# Patient Record
Sex: MALE | Race: OTHER | NOT HISPANIC OR LATINO | URBAN - METROPOLITAN AREA
[De-identification: names, ages, dates, MRNs, and addresses within clinical notes are randomized per-mention and may not be internally consistent; named-entity substitution may affect disease eponyms.]

---

## 2017-06-02 ENCOUNTER — EMERGENCY (EMERGENCY)
Facility: HOSPITAL | Age: 37
LOS: 1 days | Discharge: HOME | End: 2017-06-02
Attending: EMERGENCY MEDICINE | Admitting: EMERGENCY MEDICINE
Payer: MEDICARE

## 2017-06-02 VITALS
SYSTOLIC BLOOD PRESSURE: 130 MMHG | DIASTOLIC BLOOD PRESSURE: 72 MMHG | HEART RATE: 72 BPM | TEMPERATURE: 98 F | RESPIRATION RATE: 16 BRPM | OXYGEN SATURATION: 100 %

## 2017-06-02 VITALS
SYSTOLIC BLOOD PRESSURE: 126 MMHG | TEMPERATURE: 98 F | DIASTOLIC BLOOD PRESSURE: 71 MMHG | OXYGEN SATURATION: 100 % | RESPIRATION RATE: 16 BRPM | HEART RATE: 70 BPM

## 2017-06-02 DIAGNOSIS — Z98.890 OTHER SPECIFIED POSTPROCEDURAL STATES: Chronic | ICD-10-CM

## 2017-06-02 LAB
BASOPHILS # BLD AUTO: 0.03 K/UL — SIGNIFICANT CHANGE UP (ref 0–0.2)
BASOPHILS NFR BLD AUTO: 0.4 % — SIGNIFICANT CHANGE UP (ref 0–2)
EOSINOPHIL # BLD AUTO: 0.19 K/UL — SIGNIFICANT CHANGE UP (ref 0–0.5)
EOSINOPHIL NFR BLD AUTO: 2.3 % — SIGNIFICANT CHANGE UP (ref 0–6)
HCT VFR BLD CALC: 47.3 % — SIGNIFICANT CHANGE UP (ref 39–50)
HGB BLD-MCNC: 14.9 G/DL — SIGNIFICANT CHANGE UP (ref 13–17)
IMM GRANULOCYTES NFR BLD AUTO: 0.1 % — SIGNIFICANT CHANGE UP (ref 0–1.5)
LYMPHOCYTES # BLD AUTO: 1.69 K/UL — SIGNIFICANT CHANGE UP (ref 1–3.3)
LYMPHOCYTES # BLD AUTO: 20.2 % — SIGNIFICANT CHANGE UP (ref 13–44)
MCHC RBC-ENTMCNC: 27.2 PG — SIGNIFICANT CHANGE UP (ref 27–34)
MCHC RBC-ENTMCNC: 31.5 % — LOW (ref 32–36)
MCV RBC AUTO: 86.3 FL — SIGNIFICANT CHANGE UP (ref 80–100)
MONOCYTES # BLD AUTO: 0.3 K/UL — SIGNIFICANT CHANGE UP (ref 0–0.9)
MONOCYTES NFR BLD AUTO: 3.6 % — SIGNIFICANT CHANGE UP (ref 2–14)
NEUTROPHILS # BLD AUTO: 6.13 K/UL — SIGNIFICANT CHANGE UP (ref 1.8–7.4)
NEUTROPHILS NFR BLD AUTO: 73.4 % — SIGNIFICANT CHANGE UP (ref 43–77)
PLATELET # BLD AUTO: 265 K/UL — SIGNIFICANT CHANGE UP (ref 150–400)
PMV BLD: 9 FL — SIGNIFICANT CHANGE UP (ref 7–13)
RBC # BLD: 5.48 M/UL — SIGNIFICANT CHANGE UP (ref 4.2–5.8)
RBC # FLD: 13.1 % — SIGNIFICANT CHANGE UP (ref 10.3–14.5)
WBC # BLD: 8.35 K/UL — SIGNIFICANT CHANGE UP (ref 3.8–10.5)
WBC # FLD AUTO: 8.35 K/UL — SIGNIFICANT CHANGE UP (ref 3.8–10.5)

## 2017-06-02 PROCEDURE — 99285 EMERGENCY DEPT VISIT HI MDM: CPT | Mod: 25,GC

## 2017-06-02 RX ORDER — SODIUM CHLORIDE 9 MG/ML
1000 INJECTION INTRAMUSCULAR; INTRAVENOUS; SUBCUTANEOUS ONCE
Qty: 0 | Refills: 0 | Status: COMPLETED | OUTPATIENT
Start: 2017-06-02 | End: 2017-06-02

## 2017-06-02 RX ORDER — ONDANSETRON 8 MG/1
4 TABLET, FILM COATED ORAL ONCE
Qty: 0 | Refills: 0 | Status: COMPLETED | OUTPATIENT
Start: 2017-06-02 | End: 2017-06-02

## 2017-06-02 RX ADMIN — SODIUM CHLORIDE 1000 MILLILITER(S): 9 INJECTION INTRAMUSCULAR; INTRAVENOUS; SUBCUTANEOUS at 23:52

## 2017-06-02 RX ADMIN — ONDANSETRON 4 MILLIGRAM(S): 8 TABLET, FILM COATED ORAL at 23:52

## 2017-06-02 NOTE — ED ADULT TRIAGE NOTE - CHIEF COMPLAINT QUOTE
pt. w/ hx. of Parkinson has had N/V 7x the past 2 hours. Pt. states his neurologist changed his medication dosage during the past week and today he is experiencing nausea. Pt. states this has happened before and it was due to his medication dosage change.

## 2017-06-02 NOTE — ED PROVIDER NOTE - OBJECTIVE STATEMENT
36M h/o Parkinsons p/w N/V.  On May 26, pramipexole was changed from 6 times a day to 3 times a day and carbidopa/levodopa was also decreased. 2 days ago, pt went to pre-op for deep brain stimulation, held meds for 12 hours. Then yesterday, pt developed generalized HA and vomited x 7-8, after which pt's HA improved. Pt reports similar presentation with prior medication change. No new numbness/weakness, vision changes, vertigo, cough, CP, sough, abd pain, or diarrhea.    Meds: carbidopa/levodopa 25/100mg, pramipexole 1.5mg, trihexyphenidyl 2mg  Neurologist: Eduardo Lovett [phone] 36M h/o Parkinsons p/w N/V.  On May 26, pramipexole was changed from 6 times a day to 3 times a day and carbidopa/levodopa was also decreased. 2 days ago, pt went to pre-op for deep brain stimulation, held meds for 12 hours. Yesterday, pt developed generalized HA and vomited x 7-8, after which pt's HA improved. Pt reports similar presentation with prior medication change. No new numbness/weakness, vision changes, vertigo, cough, CP, sough, abd pain, or diarrhea. No fever or neck stiffness. No head trauma.  Meds: carbidopa/levodopa 25/100mg, pramipexole 1.5mg, trihexyphenidyl 2mg  Neurologist: Eduardo Lovett [phone]

## 2017-06-02 NOTE — ED PROVIDER NOTE - PLAN OF CARE
-- Follow up with your neurologist in 1-2 days.  -- Return to ER immediately for new or worsening symptoms, any urgent issues, or for any concerns.

## 2017-06-02 NOTE — ED PROVIDER NOTE - CARE PLAN
Principal Discharge DX:	Vomiting  Instructions for follow-up, activity and diet:	-- Follow up with your neurologist in 1-2 days.  -- Return to ER immediately for new or worsening symptoms, any urgent issues, or for any concerns.

## 2017-06-02 NOTE — ED ADULT NURSE NOTE - OBJECTIVE STATEMENT
Kamryn RN: Pt rcvd to 29 c/o nausea/vomiting since this afternoon. Denies abd pain/fevers/dysuria or other complaints. Pt PMHx arthritis/Parkinsons - on Carbidopa/Levadopa & Pramipexole, was told to hold meds x2 days ago as Preop measure for Deep Brain Stim.  Pt states has had similar S&S previously years ago w/ changes in his routine parkinsons meds. Pt aaox3, speaks slowly but appropriately,  family @ bedside to asst in history. IVL placed to L AC 20g, basic labs obtained, medicated w/ Zofran & IVF NS Bolus per MD Dill. Awaits ED ATTD evaluation & further plan of care. Call bell provided, denies other complaint currently, rpt back to primary RN Eliu MONREAL

## 2017-06-02 NOTE — ED PROVIDER NOTE - CONSTITUTIONAL, MLM
normal... Well appearing, well nourished, awake, alert, oriented to person, place, time/situation and in no apparent distress. +mask facies.

## 2017-06-02 NOTE — ED PROVIDER NOTE - MEDICAL DECISION MAKING DETAILS
36M with N/V and HA, likely 2/2 medication change. Do not suspect ICH or meningitis. Do not suspect primary GI process (no diarrhea/abd pain). Pt appears dehydrated. Plan: labs, fluids, zofran. Will attempt to reach pt's neurologist for advice on treatment.

## 2017-06-02 NOTE — ED PROVIDER NOTE - ATTENDING CONTRIBUTION TO CARE
Pt was seen and evaluated by me. Pt has a history of Parkinson's but was on hold of his medication today for evaluation for a brain stimulator. Pt states he started having nausea and vomiting with worsening neuromuscular control starting movement. Pt states having similar when there was a medication change of he levadopa. Pt denies any headache, fever, chills, SOB, chest pain, or abd pain. Noted to have difficulty with starting movement. Lungs CTA b/l. RRR. Abd soft, non-tender.

## 2017-06-03 LAB
ALBUMIN SERPL ELPH-MCNC: 4.4 G/DL — SIGNIFICANT CHANGE UP (ref 3.3–5)
ALP SERPL-CCNC: 71 U/L — SIGNIFICANT CHANGE UP (ref 40–120)
ALT FLD-CCNC: 15 U/L — SIGNIFICANT CHANGE UP (ref 4–41)
AST SERPL-CCNC: 18 U/L — SIGNIFICANT CHANGE UP (ref 4–40)
BILIRUB SERPL-MCNC: 0.5 MG/DL — SIGNIFICANT CHANGE UP (ref 0.2–1.2)
BUN SERPL-MCNC: 17 MG/DL — SIGNIFICANT CHANGE UP (ref 7–23)
CALCIUM SERPL-MCNC: 9.6 MG/DL — SIGNIFICANT CHANGE UP (ref 8.4–10.5)
CHLORIDE SERPL-SCNC: 103 MMOL/L — SIGNIFICANT CHANGE UP (ref 98–107)
CO2 SERPL-SCNC: 27 MMOL/L — SIGNIFICANT CHANGE UP (ref 22–31)
CREAT SERPL-MCNC: 0.82 MG/DL — SIGNIFICANT CHANGE UP (ref 0.5–1.3)
GLUCOSE SERPL-MCNC: 105 MG/DL — HIGH (ref 70–99)
POTASSIUM SERPL-MCNC: 4.1 MMOL/L — SIGNIFICANT CHANGE UP (ref 3.5–5.3)
POTASSIUM SERPL-SCNC: 4.1 MMOL/L — SIGNIFICANT CHANGE UP (ref 3.5–5.3)
PROT SERPL-MCNC: 7.4 G/DL — SIGNIFICANT CHANGE UP (ref 6–8.3)
SODIUM SERPL-SCNC: 143 MMOL/L — SIGNIFICANT CHANGE UP (ref 135–145)

## 2017-07-29 ENCOUNTER — INPATIENT (INPATIENT)
Facility: HOSPITAL | Age: 37
LOS: 1 days | Discharge: HOME | End: 2017-07-31
Attending: INTERNAL MEDICINE | Admitting: INTERNAL MEDICINE
Payer: MEDICARE

## 2017-07-29 VITALS
TEMPERATURE: 98 F | DIASTOLIC BLOOD PRESSURE: 73 MMHG | SYSTOLIC BLOOD PRESSURE: 134 MMHG | HEART RATE: 85 BPM | OXYGEN SATURATION: 100 % | RESPIRATION RATE: 14 BRPM

## 2017-07-29 DIAGNOSIS — R11.0 NAUSEA: ICD-10-CM

## 2017-07-29 DIAGNOSIS — R29.898 OTHER SYMPTOMS AND SIGNS INVOLVING THE MUSCULOSKELETAL SYSTEM: ICD-10-CM

## 2017-07-29 DIAGNOSIS — Z98.890 OTHER SPECIFIED POSTPROCEDURAL STATES: Chronic | ICD-10-CM

## 2017-07-29 DIAGNOSIS — G20 PARKINSON'S DISEASE: ICD-10-CM

## 2017-07-29 DIAGNOSIS — R53.1 WEAKNESS: ICD-10-CM

## 2017-07-29 LAB
ALBUMIN SERPL ELPH-MCNC: 4.3 G/DL — SIGNIFICANT CHANGE UP (ref 3.3–5)
ALP SERPL-CCNC: 81 U/L — SIGNIFICANT CHANGE UP (ref 40–120)
ALT FLD-CCNC: 20 U/L — SIGNIFICANT CHANGE UP (ref 4–41)
AST SERPL-CCNC: 22 U/L — SIGNIFICANT CHANGE UP (ref 4–40)
BASE EXCESS BLDV CALC-SCNC: 3.4 MMOL/L — SIGNIFICANT CHANGE UP
BASOPHILS # BLD AUTO: 0.07 K/UL — SIGNIFICANT CHANGE UP (ref 0–0.2)
BASOPHILS NFR BLD AUTO: 0.9 % — SIGNIFICANT CHANGE UP (ref 0–2)
BILIRUB SERPL-MCNC: 0.2 MG/DL — SIGNIFICANT CHANGE UP (ref 0.2–1.2)
BLOOD GAS VENOUS - CREATININE: 0.89 MG/DL — SIGNIFICANT CHANGE UP (ref 0.5–1.3)
BUN SERPL-MCNC: 22 MG/DL — SIGNIFICANT CHANGE UP (ref 7–23)
CALCIUM SERPL-MCNC: 9 MG/DL — SIGNIFICANT CHANGE UP (ref 8.4–10.5)
CHLORIDE BLDV-SCNC: 107 MMOL/L — SIGNIFICANT CHANGE UP (ref 96–108)
CHLORIDE SERPL-SCNC: 104 MMOL/L — SIGNIFICANT CHANGE UP (ref 98–107)
CK MB BLD-MCNC: 2.43 NG/ML — SIGNIFICANT CHANGE UP (ref 1–6.6)
CK MB BLD-MCNC: SIGNIFICANT CHANGE UP (ref 0–2.5)
CK SERPL-CCNC: 111 U/L — SIGNIFICANT CHANGE UP (ref 30–200)
CO2 SERPL-SCNC: 28 MMOL/L — SIGNIFICANT CHANGE UP (ref 22–31)
CREAT SERPL-MCNC: 0.93 MG/DL — SIGNIFICANT CHANGE UP (ref 0.5–1.3)
EOSINOPHIL # BLD AUTO: 0.34 K/UL — SIGNIFICANT CHANGE UP (ref 0–0.5)
EOSINOPHIL NFR BLD AUTO: 4.3 % — SIGNIFICANT CHANGE UP (ref 0–6)
GAS PNL BLDV: 138 MMOL/L — SIGNIFICANT CHANGE UP (ref 136–146)
GLUCOSE BLDV-MCNC: 114 — HIGH (ref 70–99)
GLUCOSE SERPL-MCNC: 102 MG/DL — HIGH (ref 70–99)
HCO3 BLDV-SCNC: 26 MMOL/L — SIGNIFICANT CHANGE UP (ref 20–27)
HCT VFR BLD CALC: 45.8 % — SIGNIFICANT CHANGE UP (ref 39–50)
HCT VFR BLDV CALC: 45.8 % — SIGNIFICANT CHANGE UP (ref 39–51)
HGB BLD-MCNC: 14.7 G/DL — SIGNIFICANT CHANGE UP (ref 13–17)
HGB BLDV-MCNC: 14.9 G/DL — SIGNIFICANT CHANGE UP (ref 13–17)
IMM GRANULOCYTES # BLD AUTO: 0.02 # — SIGNIFICANT CHANGE UP
IMM GRANULOCYTES NFR BLD AUTO: 0.3 % — SIGNIFICANT CHANGE UP (ref 0–1.5)
LACTATE BLDV-MCNC: 1 MMOL/L — SIGNIFICANT CHANGE UP (ref 0.5–2)
LYMPHOCYTES # BLD AUTO: 2.49 K/UL — SIGNIFICANT CHANGE UP (ref 1–3.3)
LYMPHOCYTES # BLD AUTO: 31.6 % — SIGNIFICANT CHANGE UP (ref 13–44)
MCHC RBC-ENTMCNC: 27.4 PG — SIGNIFICANT CHANGE UP (ref 27–34)
MCHC RBC-ENTMCNC: 32.1 % — SIGNIFICANT CHANGE UP (ref 32–36)
MCV RBC AUTO: 85.4 FL — SIGNIFICANT CHANGE UP (ref 80–100)
MONOCYTES # BLD AUTO: 0.46 K/UL — SIGNIFICANT CHANGE UP (ref 0–0.9)
MONOCYTES NFR BLD AUTO: 5.8 % — SIGNIFICANT CHANGE UP (ref 2–14)
NEUTROPHILS # BLD AUTO: 4.51 K/UL — SIGNIFICANT CHANGE UP (ref 1.8–7.4)
NEUTROPHILS NFR BLD AUTO: 57.1 % — SIGNIFICANT CHANGE UP (ref 43–77)
NRBC # FLD: 0 — SIGNIFICANT CHANGE UP
PCO2 BLDV: 54 MMHG — HIGH (ref 41–51)
PH BLDV: 7.35 PH — SIGNIFICANT CHANGE UP (ref 7.32–7.43)
PLATELET # BLD AUTO: 288 K/UL — SIGNIFICANT CHANGE UP (ref 150–400)
PMV BLD: 8.9 FL — SIGNIFICANT CHANGE UP (ref 7–13)
PO2 BLDV: 48 MMHG — HIGH (ref 35–40)
POTASSIUM BLDV-SCNC: 3.5 MMOL/L — SIGNIFICANT CHANGE UP (ref 3.4–4.5)
POTASSIUM SERPL-MCNC: 3.9 MMOL/L — SIGNIFICANT CHANGE UP (ref 3.5–5.3)
POTASSIUM SERPL-SCNC: 3.9 MMOL/L — SIGNIFICANT CHANGE UP (ref 3.5–5.3)
PROT SERPL-MCNC: 7 G/DL — SIGNIFICANT CHANGE UP (ref 6–8.3)
RBC # BLD: 5.36 M/UL — SIGNIFICANT CHANGE UP (ref 4.2–5.8)
RBC # FLD: 12.9 % — SIGNIFICANT CHANGE UP (ref 10.3–14.5)
SAO2 % BLDV: 81.4 % — SIGNIFICANT CHANGE UP (ref 60–85)
SODIUM SERPL-SCNC: 143 MMOL/L — SIGNIFICANT CHANGE UP (ref 135–145)
WBC # BLD: 7.89 K/UL — SIGNIFICANT CHANGE UP (ref 3.8–10.5)
WBC # FLD AUTO: 7.89 K/UL — SIGNIFICANT CHANGE UP (ref 3.8–10.5)

## 2017-07-29 PROCEDURE — 99223 1ST HOSP IP/OBS HIGH 75: CPT

## 2017-07-29 PROCEDURE — 71010: CPT | Mod: 26

## 2017-07-29 RX ORDER — SODIUM CHLORIDE 9 MG/ML
1000 INJECTION INTRAMUSCULAR; INTRAVENOUS; SUBCUTANEOUS ONCE
Qty: 0 | Refills: 0 | Status: COMPLETED | OUTPATIENT
Start: 2017-07-29 | End: 2017-07-29

## 2017-07-29 RX ORDER — TRIHEXYPHENIDYL HCL 2 MG
2 TABLET ORAL
Qty: 0 | Refills: 0 | Status: DISCONTINUED | OUTPATIENT
Start: 2017-07-29 | End: 2017-07-31

## 2017-07-29 RX ORDER — TRIHEXYPHENIDYL HCL 2 MG
2 TABLET ORAL ONCE
Qty: 0 | Refills: 0 | Status: COMPLETED | OUTPATIENT
Start: 2017-07-29 | End: 2017-07-29

## 2017-07-29 RX ORDER — PRAMIPEXOLE DIHYDROCHLORIDE 0.12 MG/1
1 TABLET ORAL
Qty: 0 | Refills: 0 | Status: DISCONTINUED | OUTPATIENT
Start: 2017-07-29 | End: 2017-07-30

## 2017-07-29 RX ORDER — ONDANSETRON 8 MG/1
4 TABLET, FILM COATED ORAL EVERY 6 HOURS
Qty: 0 | Refills: 0 | Status: DISCONTINUED | OUTPATIENT
Start: 2017-07-29 | End: 2017-07-31

## 2017-07-29 RX ORDER — PRAMIPEXOLE DIHYDROCHLORIDE 0.12 MG/1
1.5 TABLET ORAL
Qty: 0 | Refills: 0 | Status: DISCONTINUED | OUTPATIENT
Start: 2017-07-29 | End: 2017-07-31

## 2017-07-29 RX ORDER — CARBIDOPA AND LEVODOPA 25; 100 MG/1; MG/1
1 TABLET ORAL ONCE
Qty: 0 | Refills: 0 | Status: COMPLETED | OUTPATIENT
Start: 2017-07-29 | End: 2017-07-29

## 2017-07-29 RX ORDER — CARBIDOPA AND LEVODOPA 25; 100 MG/1; MG/1
1.5 TABLET ORAL
Qty: 0 | Refills: 0 | Status: DISCONTINUED | OUTPATIENT
Start: 2017-07-29 | End: 2017-07-31

## 2017-07-29 RX ORDER — ONDANSETRON 8 MG/1
4 TABLET, FILM COATED ORAL ONCE
Qty: 0 | Refills: 0 | Status: COMPLETED | OUTPATIENT
Start: 2017-07-29 | End: 2017-07-29

## 2017-07-29 RX ADMIN — CARBIDOPA AND LEVODOPA 1 TABLET(S): 25; 100 TABLET ORAL at 21:41

## 2017-07-29 RX ADMIN — SODIUM CHLORIDE 1000 MILLILITER(S): 9 INJECTION INTRAMUSCULAR; INTRAVENOUS; SUBCUTANEOUS at 21:05

## 2017-07-29 RX ADMIN — Medication 2 MILLIGRAM(S): at 22:07

## 2017-07-29 RX ADMIN — SODIUM CHLORIDE 1000 MILLILITER(S): 9 INJECTION INTRAMUSCULAR; INTRAVENOUS; SUBCUTANEOUS at 20:28

## 2017-07-29 RX ADMIN — ONDANSETRON 4 MILLIGRAM(S): 8 TABLET, FILM COATED ORAL at 20:27

## 2017-07-29 NOTE — ED PROVIDER NOTE - PROGRESS NOTE DETAILS
Irena FERRER- SPOKE to physician covering patient's neurologist at [facility] and called neuro consult at [facility], restarted him on sinemet and trihexyphenidyl, admit to hospitalist for levodopa withdrawl

## 2017-07-29 NOTE — CONSULT NOTE ADULT - SUBJECTIVE AND OBJECTIVE BOX
HPI: The patient is a 36 yo male, with PMHx of Parkinson's who follows up with his outpatient neurologist Dr. Lovett. He presented with 1 days onset of N&V and diarrhea, as well as slowness of his intentional movements. His medications were adjusted 2 days ago, anti-cholinergics and dopamin agonists were decreased. There is no bulbar dysfunction. Patient had his trihexyphenidyl decreased from TID to QD, mirapex 1.5 mg BID decresed to 1 mg BID, evening dose of sinemet discontinued and replaced with Rytary but was unable to receive medications secondary to issues at pharmacy. The ED resident contacted his outpatient neurologist who recommended to put him back on previous medication with outpatient work up.    MEDICATIONS  (STANDING):  pramipexole 1 milliGRAM(s) Oral <User Schedule>  pramipexole 1.5 milliGRAM(s) Oral <User Schedule>  carbidopa/levodopa  25/100 1.5 Tablet(s) Oral <User Schedule>  trihexyphenidyl 2 milliGRAM(s) Oral <User Schedule>    MEDICATIONS  (PRN):  ondansetron Injectable 4 milliGRAM(s) IV Push every 6 hours PRN Nausea    PAST MEDICAL & SURGICAL HISTORY:  Arthritis  Parkinson disease  H/O inguinal hernia repair    FAMILY HISTORY:  No pertinent family history in first degree relatives    Allergies    No Known Allergies    Intolerances    SHx - No smoking, No ETOH, No drug abuse    Review of Systems:  As HPI    Vital Signs Last 24 Hrs  T(C): 36.7 (29 Jul 2017 20:37), Max: 36.9 (29 Jul 2017 19:55)  T(F): 98.1 (29 Jul 2017 20:37), Max: 98.5 (29 Jul 2017 19:55)  HR: 53 (29 Jul 2017 23:01) (53 - 85)  BP: 127/78 (29 Jul 2017 23:01) (115/77 - 134/73)  BP(mean): --  RR: 16 (29 Jul 2017 23:01) (14 - 16)  SpO2: 97% (29 Jul 2017 23:01) (97% - 100%)    General Exam:   General appearance: lied back on the berger with minimal facial and extremity movement                Neurological Exam:  Mental Status: Orientated to self, date and place.  No dysarthria, aphasia but very slow and low tone speech.    Cranial Nerves: CN I - not tested.  PERRL, EOMI, VFF, no nystagmus or diplopia.  No APD.  Fundi not visualized bilaterally.  CN V1-3 intact to light touch and pinprick.  No facial asymmetry.  Hearing intact to finger rub bilaterally.  Tongue, uvula and palate midline.  Sternocleidomastoid and Trapezius intact bilaterally.    Motor:   Tone: Mildly increased                Strength:     [] Upper extremity: Force appears to be NL but with a slow movement  [] Lower extremity: Force appears to be NL but with a slow movement               Dysmetria: Did not cooperate  Tremor: No resting, postural or action tremor.  No myoclonus.  Sensation: intact to light touch, pinprick, vibration and proprioception    Deep Tendon Reflexes: depressed at bilateral biceps, triceps, brachioradialis, knee and ankle    Gait: Did not cooperate    Other:    07-29    143  |  104  |  22  ----------------------------<  102<H>  3.9   |  28  |  0.93    Ca    9.0      29 Jul 2017 20:25    TPro  7.0  /  Alb  4.3  /  TBili  0.2  /  DBili  x   /  AST  22  /  ALT  20  /  AlkPhos  81  07-29 07-29    143  |  104  |  22  ----------------------------<  102<H>  3.9   |  28  |  0.93    Ca    9.0      29 Jul 2017 20:25    TPro  7.0  /  Alb  4.3  /  TBili  0.2  /  DBili  x   /  AST  22  /  ALT  20  /  AlkPhos  81  07-29                          14.7   7.89  )-----------( 288      ( 29 Jul 2017 20:25 )             45.8       Radiology    CT  MRI  EKG:  tele:  TTE:  EEG:

## 2017-07-29 NOTE — ED PROVIDER NOTE - MEDICAL DECISION MAKING DETAILS
will check labs, urinalysis, hydrate, theer are concerns for levodopa withdrawl , NMS. will check for ck ,electrolytes and plan to admit will check labs, urinalysis, hydrate, theer are concerns for levodopa/anticholinergic withdrawl , NMS. will check for ck ,electrolytes and plan to admit

## 2017-07-29 NOTE — ED ADULT TRIAGE NOTE - CHIEF COMPLAINT QUOTE
Pt has Parkinson disease and states he is not able to move pt lethargic in triage and his wife states that he has been vomiting and having diarrhea.

## 2017-07-29 NOTE — ED ADULT NURSE NOTE - OBJECTIVE STATEMENT
37 years old male presents with complaints of lethargy, weakness, decreased eating and drinking accompanied with nausea and vomiting. Patient states that his medications ( sinemet 25/100mg, rytary 95mg, trihexyphenidyl 2mg) have been changed on 7/27/2017, and did not take rytary since 7/26/2017. On arrival, patient is lethargic, respond to voice, no use of accessory muscles noted, normal sinus rhythm on cardiac monitor, abdomen soft and nontender, 18 gauge saline lock placed on right AC, blood drawn and sent. Will follow up and monitor.

## 2017-07-29 NOTE — H&P ADULT - NSHPPHYSICALEXAM_GEN_ALL_CORE
GENERAL: NAD, well-developed, well-nourished  HEAD:  Atraumatic, Normocephalic  EYES: EOMI, PERRLA, conjunctiva and sclera clear  NECK: Supple, No JVD  CHEST/LUNG: Clear to auscultation bilaterally; No wheezes, rales or rhonchi  HEART: Regular rate and rhythm; No murmurs, rubs, or gallops, (+)S1, S2  ABDOMEN: Soft, Nontender, Nondistended; Normal Bowel sounds   EXTREMITIES:  2+ Peripheral Pulses, No clubbing, cyanosis, or edema  PSYCH: normal mood and affect, however, delayed response  NEUROLOGY: AAOx3, non-focal. Slow to respond verbally, w/ some dysarthria, and intermittent weakness/stiffness throughout body.  SKIN: No rashes or lesions

## 2017-07-29 NOTE — H&P ADULT - HISTORY OF PRESENT ILLNESS
Patient is a 36 y/o M PMH Parkinson's Disease p/w weakness, stiffness, vomiting, diarrhea. Patient had medication adjusted 2 days ago and began having symptoms yesterday. Had his trihexyphenidyl decreased from TID to daily, Pramipexole 1.5 mg PO BID decreased to 1 mg BID, evening dose of Sinemet was replaced with Rytary but was unable to receive medications secondary to issues at pharmacy. Patient reports that he was unable to ambulate today and had to be brought in to ER. Received a dose of Sinemet and trihexyphenidyl in the ER. Reports that he was then able to ambulate to the bathroom in the ER and is feeling overall better.

## 2017-07-29 NOTE — ED ADULT NURSE NOTE - CHPI ED SYMPTOMS NEG
no change in level of consciousness/no blurred vision/no numbness/no loss of consciousness/no fever/no confusion

## 2017-07-29 NOTE — CONSULT NOTE ADULT - ATTENDING COMMENTS
Seen and examined on rounds with housestaff.  Likely dopamine withdrawal as cause of his symptoms of severe bradycardia yesterday.  Discussed with patient's wife home dosing of PD medications:  Sinemet 1.5 tabs TID, Mirapex 1.5 mg 7 AM/1 PM, Trihexphenidyl 2 mg QHS.  Please restart these doses and DC home today with followup with his outpatient neurologist. Seen and examined on rounds with housestaff.  Likely dopamine withdrawal as cause of his symptoms of severe bradycardia yesterday.  Discussed with patient's wife home dosing of PD medications:  Sinemet 1.5 tabs 5x/day, Mirapex 1.5 mg 7 AM/1 PM, Trihexphenidyl 2 mg QHS.  Please restart these doses and DC home today with followup with his outpatient neurologist.

## 2017-07-29 NOTE — H&P ADULT - ASSESSMENT
Patient is a 36 y/o M [facility] Parkinson's Disease p/w weakness, stiffness, vomiting, diarrhea.

## 2017-07-29 NOTE — ED PROVIDER NOTE - OBJECTIVE STATEMENT
36 y/o M with h/o Parkinsons disease presents with complaint of weakness, stiffness and vomiting/diarrhea. Patient had medication adjustment 2 days ago and began having symptoms yesterday. Patient had his trihexyphenidyl decreased from TID to QD, mirapex 1.5 mg BID decresed to 1 mg BID, evening dose of sinemet discontinued and replaced with Rytary but was unable to receive medications secondary to issues at pharmacy. Patient has been unable to move and feels stiff.

## 2017-07-29 NOTE — CONSULT NOTE ADULT - ASSESSMENT
The patient is a 36 yo male, with PMHx of Parkinson's who follows up with his outpatient neurologist Dr. Lovett. He presented with 1 days onset of what appears to be anticholinergic withdrawal, after a recent decrease in his parkinson's medication. Dr. Mcguire was contacted by ED resident who reported that the patient is prone to medication withdrawals, the decision was made to put the patient back on his previous medications and follow up with his neurologist as an outpatient. The patient is a 36 yo male, with PMHx of Parkinson's who follows up with his outpatient neurologist Dr. Lovett. He presented with 1 days onset of what appears to be anticholinergic withdrawal, after a recent decrease in his parkinson's medication. Dr. Mcguire was contacted by ED resident who reported that the patient is prone to medication withdrawals, the decision was made to put the patient back on his previous medications and follow up with his neurologist as an outpatient. Monitoring to regain ambulation and prevent DAWS are recommended.

## 2017-07-29 NOTE — ED PROVIDER NOTE - ATTENDING CONTRIBUTION TO CARE
Irena FERRER- 38 Y/O M with h/o parkinson disease had a recent change in his meds , he was switched from sinemet to rytary and was not able to get rytary, as had some delays with pharmacy and now for 2 days he feels very tired and weak, feels locked in , unable to move and walk. Pt is ambulatory at baseline. Pt recently had dental extraction and root canal procedure and was given amoxicillin and has chills but no fever. Pt also complains of diarrhea, nausea, vomiting. No recent travel, outside food or sick contact    pt is alert, tired appearing male, s1s2 normal reg, b/l clear breath sounds, abd soft , nt, nd, neuro aox3 but increased rigidity and tremors, unable to lift arms and legs, skin warm, dry, good turgor, peerl eomi    ddx: NMS. levodopa withdrawl, dehydration, gastroenetritis

## 2017-07-29 NOTE — H&P ADULT - NSHPLABSRESULTS_GEN_ALL_CORE
07-29    143  |  104  |  22  ----------------------------<  102<H>  3.9   |  28  |  0.93    Ca    9.0      29 Jul 2017 20:25    TPro  7.0  /  Alb  4.3  /  TBili  0.2  /  DBili  x   /  AST  22  /  ALT  20  /  AlkPhos  81  07-29                            14.7   7.89  )-----------( 288      ( 29 Jul 2017 20:25 )             45.8       CARDIAC MARKERS ( 29 Jul 2017 20:25 )  x     / x     / 111 u/L / 2.43 ng/mL / x            LIVER FUNCTIONS - ( 29 Jul 2017 20:25 )  Alb: 4.3 g/dL / Pro: 7.0 g/dL / ALK PHOS: 81 u/L / ALT: 20 u/L / AST: 22 u/L / GGT: x                 20:25 - VBG - pH: 7.35  | pCO2: 54    | pO2: 48    | Lactate: 1.0      EKG NSR at 65

## 2017-07-29 NOTE — H&P ADULT - NSHPREVIEWOFSYSTEMS_GEN_ALL_CORE
CONSTITUTIONAL: +weakness, no fevers or chills  EYES/ENT: No visual changes;  No dysphagia  NECK: No pain or stiffness  RESPIRATORY: No cough, wheezing, hemoptysis; No shortness of breath  CARDIOVASCULAR: No chest pain or palpitations; No lower extremity edema  GASTROINTESTINAL: No abdominal or epigastric pain. +nausea, vomiting, diarrhea. no hematemesis, no melena or hematochezia.  GENITOURINARY: No dysuria, frequency or hematuria  NEUROLOGICAL: +weakness/stiffness  SKIN: No itching, burning, rashes, or lesions   All other review of systems is negative unless indicated above.

## 2017-07-30 RX ORDER — CARBIDOPA AND LEVODOPA 25; 100 MG/1; MG/1
1 TABLET ORAL AT BEDTIME
Qty: 0 | Refills: 0 | Status: DISCONTINUED | OUTPATIENT
Start: 2017-07-30 | End: 2017-07-31

## 2017-07-30 RX ORDER — IBUPROFEN 200 MG
600 TABLET ORAL EVERY 6 HOURS
Qty: 0 | Refills: 0 | Status: DISCONTINUED | OUTPATIENT
Start: 2017-07-30 | End: 2017-07-31

## 2017-07-30 RX ORDER — PRAMIPEXOLE DIHYDROCHLORIDE 0.12 MG/1
1 TABLET ORAL
Qty: 0 | Refills: 0 | Status: DISCONTINUED | OUTPATIENT
Start: 2017-07-31 | End: 2017-07-31

## 2017-07-30 RX ADMIN — PRAMIPEXOLE DIHYDROCHLORIDE 1.5 MILLIGRAM(S): 0.12 TABLET ORAL at 07:01

## 2017-07-30 RX ADMIN — CARBIDOPA AND LEVODOPA 1 TABLET(S): 25; 100 TABLET ORAL at 22:16

## 2017-07-30 RX ADMIN — PRAMIPEXOLE DIHYDROCHLORIDE 1.5 MILLIGRAM(S): 0.12 TABLET ORAL at 13:04

## 2017-07-30 RX ADMIN — CARBIDOPA AND LEVODOPA 1.5 TABLET(S): 25; 100 TABLET ORAL at 19:00

## 2017-07-30 RX ADMIN — Medication 2 MILLIGRAM(S): at 07:01

## 2017-07-30 RX ADMIN — CARBIDOPA AND LEVODOPA 1.5 TABLET(S): 25; 100 TABLET ORAL at 13:04

## 2017-07-30 RX ADMIN — CARBIDOPA AND LEVODOPA 1.5 TABLET(S): 25; 100 TABLET ORAL at 16:09

## 2017-07-30 RX ADMIN — CARBIDOPA AND LEVODOPA 1.5 TABLET(S): 25; 100 TABLET ORAL at 07:00

## 2017-07-30 RX ADMIN — ONDANSETRON 4 MILLIGRAM(S): 8 TABLET, FILM COATED ORAL at 15:30

## 2017-07-30 RX ADMIN — CARBIDOPA AND LEVODOPA 1.5 TABLET(S): 25; 100 TABLET ORAL at 10:03

## 2017-07-30 NOTE — PROGRESS NOTE ADULT - ASSESSMENT
Patient is a 36 y/o M [facility] Parkinson's Disease p/w weakness, stiffness, vomiting, diarrhea.

## 2017-07-31 ENCOUNTER — TRANSCRIPTION ENCOUNTER (OUTPATIENT)
Age: 37
End: 2017-07-31

## 2017-07-31 VITALS
OXYGEN SATURATION: 95 % | HEART RATE: 64 BPM | TEMPERATURE: 98 F | SYSTOLIC BLOOD PRESSURE: 113 MMHG | DIASTOLIC BLOOD PRESSURE: 72 MMHG | RESPIRATION RATE: 16 BRPM

## 2017-07-31 RX ORDER — CARBIDOPA AND LEVODOPA 25; 100 MG/1; MG/1
1 TABLET ORAL
Qty: 30 | Refills: 0 | COMMUNITY
Start: 2017-07-31 | End: 2017-08-30

## 2017-07-31 RX ORDER — CARBIDOPA AND LEVODOPA 25; 100 MG/1; MG/1
1 TABLET ORAL
Qty: 0 | Refills: 0 | COMMUNITY
Start: 2017-07-31

## 2017-07-31 RX ORDER — CARBIDOPA AND LEVODOPA 25; 100 MG/1; MG/1
1 TABLET ORAL
Qty: 30 | Refills: 0 | OUTPATIENT
Start: 2017-07-31 | End: 2017-08-30

## 2017-07-31 RX ADMIN — Medication 2 MILLIGRAM(S): at 06:44

## 2017-07-31 RX ADMIN — CARBIDOPA AND LEVODOPA 1.5 TABLET(S): 25; 100 TABLET ORAL at 06:45

## 2017-07-31 RX ADMIN — PRAMIPEXOLE DIHYDROCHLORIDE 1.5 MILLIGRAM(S): 0.12 TABLET ORAL at 06:44

## 2017-07-31 RX ADMIN — CARBIDOPA AND LEVODOPA 1.5 TABLET(S): 25; 100 TABLET ORAL at 10:19

## 2017-07-31 NOTE — DISCHARGE NOTE ADULT - HOSPITAL COURSE
36 y/o M PMH Parkinson's Disease p/w weakness, stiffness, vomiting, diarrhea.    Hospital Course     Parkinson disease, symptomatic.   Home medications resumed  Neurology consulted       Rigidity.  Improved.     Weakness.   Improving, assist with ADLs, PT as tolerated.  PT rec: Home no skilled needs      Nausea.  s/p IVF in the ER, zofran IV PRN.   Improved.     Dispo: Home

## 2017-07-31 NOTE — DISCHARGE NOTE ADULT - CARE PLAN
Principal Discharge DX:	Parkinson disease  Goal:	C/w taking your meds  Instructions for follow-up, activity and diet:	Follow up with your neurologist in 1 -2 weeks. Call for an appointment  Secondary Diagnosis:	Rigidity  Goal:	Improved

## 2017-07-31 NOTE — DISCHARGE NOTE ADULT - PLAN OF CARE
Improved Follow up with your neurologist in 1 -2 weeks. Call for an appointment C/w taking your meds

## 2017-07-31 NOTE — PHYSICAL THERAPY INITIAL EVALUATION ADULT - PERTINENT HX OF CURRENT PROBLEM, REHAB EVAL
37 M reports to the ED with increased stiffness, weakness, vomiting, diarrhea persisting 1 day. Adjustment of medications 2 days ago.

## 2017-07-31 NOTE — PROGRESS NOTE ADULT - PROBLEM SELECTOR PLAN 1
Will resume patient's home meds for now,   neurology follow up is appreciated  Dc planning home  Case dw neuro at length yesterday

## 2017-07-31 NOTE — PROGRESS NOTE ADULT - ASSESSMENT
Patient is a 36 y/o M [facility] Parkinson's Disease p/w weakness, stiffness, vomiting, diarrhea.

## 2017-07-31 NOTE — PHYSICAL THERAPY INITIAL EVALUATION ADULT - GENERAL OBSERVATIONS, REHAB EVAL
Pt. received supine in bed; patient left sitting on edge of bed with call bell within reach. RN notified of position change.

## 2017-07-31 NOTE — DISCHARGE NOTE ADULT - MEDICATION SUMMARY - MEDICATIONS TO TAKE
I will START or STAY ON the medications listed below when I get home from the hospital:    pramipexole 1.5 mg oral tablet  -- 1 tab(s) by mouth 2 times a day  -- Indication: For Parkinson disease, symptomatic    trihexyphenidyl 2 mg oral tablet  -- 1 tab(s) by mouth once a day  -- Indication: For Parkinson disease, symptomatic    pramipexole 1 mg oral tablet  -- 1 tab(s) by mouth once a day  -- Indication: For Parkinson disease, symptomatic    Sinemet 25 mg-100 mg oral tablet  -- 1.5 tab(s) by mouth 5 times a day  -- Indication: For Parkinson disease, symptomatic    carbidopa-levodopa 25 mg-100 mg oral tablet, extended release  -- 1 tab(s) by mouth once a day (at bedtime)  -- Indication: For Parkinson disease, symptomatic

## 2017-07-31 NOTE — DISCHARGE NOTE ADULT - PATIENT PORTAL LINK FT
“You can access the [employer] Patient Portal, offered by [facility], by registering with the following website: [email or web address]”

## 2017-07-31 NOTE — PHYSICAL THERAPY INITIAL EVALUATION ADULT - ACTIVE RANGE OF MOTION EXAMINATION, REHAB EVAL
bilateral  lower extremity Active ROM was WFL (within functional limits)/bilateral upper extremity Active ROM was WFL (within functional limits)/no Active ROM deficits were identified

## 2017-07-31 NOTE — PROGRESS NOTE ADULT - SUBJECTIVE AND OBJECTIVE BOX
37y            Male                                                                                                                                          PAST MEDICAL & SURGICAL HISTORY:  Arthritis  Parkinson disease  H/O inguinal hernia repair        ondansetron Injectable 4 milliGRAM(s) IV Push every 6 hours PRN  pramipexole 1.5 milliGRAM(s) Oral <User Schedule>  carbidopa/levodopa  25/100 1.5 Tablet(s) Oral <User Schedule>  trihexyphenidyl 2 milliGRAM(s) Oral <User Schedule>  ibuprofen  Tablet 600 milliGRAM(s) Oral every 6 hours PRN  pramipexole 1 milliGRAM(s) Oral <User Schedule>  carbidopa/levodopa CR 25/100 1 Tablet(s) Oral at bedtime      REVIEW OF SYSTEMS  General: InNAD  Respiratory and Thorax: No SOB  Cardiovascular: No CP, No palpitations  Gastrointestinal: NON/V/D  Musculoskeletal: No muscular pain	  Neurological: No HA                          14.7   7.89  )-----------( 288      ( 29 Jul 2017 20:25 )             45.8                                                               07-29    143  |  104  |  22  ----------------------------<  102<H>  3.9   |  28  |  0.93    Ca    9.0      29 Jul 2017 20:25    TPro  7.0  /  Alb  4.3  /  TBili  0.2  /  DBili  x   /  AST  22  /  ALT  20  /  AlkPhos  81  07-29       Vital Signs Last 24 Hrs  T(C): 36.8 (31 Jul 2017 06:42), Max: 36.9 (30 Jul 2017 21:36)  T(F): 98.3 (31 Jul 2017 06:42), Max: 98.4 (30 Jul 2017 21:36)  HR: 64 (31 Jul 2017 06:42) (59 - 90)  BP: 113/72 (31 Jul 2017 06:42) (108/61 - 122/73)  BP(mean): --  RR: 16 (31 Jul 2017 06:42) (16 - 16)  SpO2: 95% (31 Jul 2017 06:42) (95% - 100%)    PHYSICAL EXAM:  Constitutional: Well nourished  Eyes: PERRLA, EOMI  Neck: supple, No JVD  Respiratory: CTABL   Cardiovascular: S1/S2, RRR  Gastrointestinal: Soft, NT, ND, + BS  Extremities: NO edema  Neurological: A+Ox3

## 2017-08-25 ENCOUNTER — EMERGENCY (EMERGENCY)
Facility: HOSPITAL | Age: 37
LOS: 0 days | Discharge: HOME | End: 2017-08-25
Attending: EMERGENCY MEDICINE
Payer: MEDICARE

## 2017-08-25 VITALS
RESPIRATION RATE: 20 BRPM | WEIGHT: 164.91 LBS | HEART RATE: 85 BPM | HEIGHT: 67 IN | TEMPERATURE: 99 F | DIASTOLIC BLOOD PRESSURE: 74 MMHG | OXYGEN SATURATION: 99 % | SYSTOLIC BLOOD PRESSURE: 140 MMHG

## 2017-08-25 DIAGNOSIS — Z98.890 OTHER SPECIFIED POSTPROCEDURAL STATES: Chronic | ICD-10-CM

## 2017-08-25 PROCEDURE — 99283 EMERGENCY DEPT VISIT LOW MDM: CPT

## 2017-08-25 RX ORDER — ACETAMINOPHEN 500 MG
975 TABLET ORAL ONCE
Qty: 0 | Refills: 0 | Status: COMPLETED | OUTPATIENT
Start: 2017-08-25 | End: 2017-08-25

## 2017-08-25 RX ORDER — AZTREONAM 2 G
2 VIAL (EA) INJECTION
Qty: 28 | Refills: 0 | OUTPATIENT
Start: 2017-08-25 | End: 2017-09-01

## 2017-08-25 RX ORDER — AZTREONAM 2 G
1 VIAL (EA) INJECTION
Qty: 14 | Refills: 0 | OUTPATIENT
Start: 2017-08-25 | End: 2017-09-01

## 2017-08-25 RX ORDER — ACETAMINOPHEN 500 MG
2 TABLET ORAL
Qty: 40 | Refills: 0 | OUTPATIENT
Start: 2017-08-25 | End: 2017-08-30

## 2017-08-25 RX ADMIN — Medication 1 TABLET(S): at 11:09

## 2017-08-25 RX ADMIN — Medication 975 MILLIGRAM(S): at 11:09

## 2017-08-25 NOTE — ED PROVIDER NOTE - MUSCULOSKELETAL, MLM
Spine appears normal, range of motion is not limited, R 3rd digit swelling, redness radiating down lateral aspect of finger down to mid digit.

## 2017-08-25 NOTE — ED PROVIDER NOTE - OBJECTIVE STATEMENT
37 year old male with PMH of Parkinson's disease, presenting to ED due to R 3rd digit swelling and pain noted, states no fever/chills had been placed on clindamycin 300mg yesterday and now with continued throbbing discomfort.

## 2017-08-25 NOTE — ED ADULT TRIAGE NOTE - CHIEF COMPLAINT QUOTE
Stated " I have abscess on my rt middle finger I tried pricking it with lancet, its now discolored and painful"

## 2017-08-25 NOTE — ED ADULT NURSE NOTE - OBJECTIVE STATEMENT
Received patient on stretcher, alert and oriented x4. He states that today he pricked the middle finger on his right hand in an attempt to burst an abcess. Finger looks red and swollen.

## 2017-08-25 NOTE — ED PROVIDER NOTE - MEDICAL DECISION MAKING DETAILS
R 3rd digit infection with cellulitis- spoke to Dr. Carmichael - who states may follow up with him no need for drainage at this time, pt had punctured pad of finger with lancet before and now with redness and swelling due to trauma. to dc with Dr. Carmichael follow up and started on bactrim and follow up in 2-3 days.

## 2017-08-26 ENCOUNTER — EMERGENCY (EMERGENCY)
Facility: HOSPITAL | Age: 37
LOS: 0 days | Discharge: HOME | End: 2017-08-27
Attending: EMERGENCY MEDICINE
Payer: MEDICARE

## 2017-08-26 VITALS
HEIGHT: 67 IN | TEMPERATURE: 99 F | WEIGHT: 162.92 LBS | OXYGEN SATURATION: 100 % | HEART RATE: 67 BPM | DIASTOLIC BLOOD PRESSURE: 98 MMHG | RESPIRATION RATE: 17 BRPM | SYSTOLIC BLOOD PRESSURE: 137 MMHG

## 2017-08-26 DIAGNOSIS — G20 PARKINSON'S DISEASE: ICD-10-CM

## 2017-08-26 DIAGNOSIS — M19.90 UNSPECIFIED OSTEOARTHRITIS, UNSPECIFIED SITE: ICD-10-CM

## 2017-08-26 DIAGNOSIS — L03.011 CELLULITIS OF RIGHT FINGER: ICD-10-CM

## 2017-08-26 DIAGNOSIS — Z98.890 OTHER SPECIFIED POSTPROCEDURAL STATES: Chronic | ICD-10-CM

## 2017-08-26 DIAGNOSIS — M79.644 PAIN IN RIGHT FINGER(S): ICD-10-CM

## 2017-08-26 LAB
ALBUMIN SERPL ELPH-MCNC: 3.7 G/DL — SIGNIFICANT CHANGE UP (ref 3.3–5)
ALP SERPL-CCNC: 94 U/L — SIGNIFICANT CHANGE UP (ref 40–120)
ALT FLD-CCNC: 11 U/L — LOW (ref 12–78)
ANION GAP SERPL CALC-SCNC: 8 MMOL/L — SIGNIFICANT CHANGE UP (ref 5–17)
APPEARANCE UR: CLEAR — SIGNIFICANT CHANGE UP
APTT BLD: 44.7 SEC — HIGH (ref 27.5–37.4)
AST SERPL-CCNC: 23 U/L — SIGNIFICANT CHANGE UP (ref 15–37)
BACTERIA # UR AUTO: ABNORMAL
BASOPHILS # BLD AUTO: 0.1 K/UL — SIGNIFICANT CHANGE UP (ref 0–0.2)
BASOPHILS NFR BLD AUTO: 0.8 % — SIGNIFICANT CHANGE UP (ref 0–2)
BILIRUB SERPL-MCNC: 0.5 MG/DL — SIGNIFICANT CHANGE UP (ref 0.2–1.2)
BILIRUB UR-MCNC: NEGATIVE — SIGNIFICANT CHANGE UP
BUN SERPL-MCNC: 21 MG/DL — SIGNIFICANT CHANGE UP (ref 7–23)
CALCIUM SERPL-MCNC: 8.5 MG/DL — SIGNIFICANT CHANGE UP (ref 8.5–10.1)
CHLORIDE SERPL-SCNC: 106 MMOL/L — SIGNIFICANT CHANGE UP (ref 96–108)
CO2 SERPL-SCNC: 25 MMOL/L — SIGNIFICANT CHANGE UP (ref 22–31)
COLOR SPEC: YELLOW — SIGNIFICANT CHANGE UP
CREAT SERPL-MCNC: 0.9 MG/DL — SIGNIFICANT CHANGE UP (ref 0.5–1.3)
DIFF PNL FLD: ABNORMAL
EOSINOPHIL # BLD AUTO: 0.3 K/UL — SIGNIFICANT CHANGE UP (ref 0–0.5)
EOSINOPHIL NFR BLD AUTO: 2.5 % — SIGNIFICANT CHANGE UP (ref 0–6)
EPI CELLS # UR: SIGNIFICANT CHANGE UP
GLUCOSE SERPL-MCNC: 83 MG/DL — SIGNIFICANT CHANGE UP (ref 70–99)
GLUCOSE UR QL: NEGATIVE MG/DL — SIGNIFICANT CHANGE UP
HCT VFR BLD CALC: 48.1 % — SIGNIFICANT CHANGE UP (ref 39–50)
HGB BLD-MCNC: 14.7 G/DL — SIGNIFICANT CHANGE UP (ref 13–17)
INR BLD: 1.08 RATIO — SIGNIFICANT CHANGE UP (ref 0.88–1.16)
KETONES UR-MCNC: NEGATIVE — SIGNIFICANT CHANGE UP
LACTATE SERPL-SCNC: 0.6 MMOL/L — LOW (ref 0.7–2)
LEUKOCYTE ESTERASE UR-ACNC: NEGATIVE — SIGNIFICANT CHANGE UP
LYMPHOCYTES # BLD AUTO: 1.8 K/UL — SIGNIFICANT CHANGE UP (ref 1–3.3)
LYMPHOCYTES # BLD AUTO: 16.2 % — SIGNIFICANT CHANGE UP (ref 13–44)
MCHC RBC-ENTMCNC: 26.6 PG — LOW (ref 27–34)
MCHC RBC-ENTMCNC: 30.6 GM/DL — LOW (ref 32–36)
MCV RBC AUTO: 86.8 FL — SIGNIFICANT CHANGE UP (ref 80–100)
MONOCYTES # BLD AUTO: 0.5 K/UL — SIGNIFICANT CHANGE UP (ref 0–0.9)
MONOCYTES NFR BLD AUTO: 4.2 % — SIGNIFICANT CHANGE UP (ref 2–14)
NEUTROPHILS # BLD AUTO: 8.6 K/UL — HIGH (ref 1.8–7.4)
NEUTROPHILS NFR BLD AUTO: 76.3 % — SIGNIFICANT CHANGE UP (ref 43–77)
NITRITE UR-MCNC: NEGATIVE — SIGNIFICANT CHANGE UP
PH UR: 6.5 — SIGNIFICANT CHANGE UP (ref 5–8)
PLATELET # BLD AUTO: 305 K/UL — SIGNIFICANT CHANGE UP (ref 150–400)
POTASSIUM SERPL-MCNC: 4.1 MMOL/L — SIGNIFICANT CHANGE UP (ref 3.5–5.3)
POTASSIUM SERPL-SCNC: 4.1 MMOL/L — SIGNIFICANT CHANGE UP (ref 3.5–5.3)
PROT SERPL-MCNC: 7.6 GM/DL — SIGNIFICANT CHANGE UP (ref 6–8.3)
PROT UR-MCNC: NEGATIVE MG/DL — SIGNIFICANT CHANGE UP
PROTHROM AB SERPL-ACNC: 11.8 SEC — SIGNIFICANT CHANGE UP (ref 9.8–12.7)
RBC # BLD: 5.54 M/UL — SIGNIFICANT CHANGE UP (ref 4.2–5.8)
RBC # FLD: 12.2 % — SIGNIFICANT CHANGE UP (ref 11–15)
RBC CASTS # UR COMP ASSIST: SIGNIFICANT CHANGE UP /HPF (ref 0–4)
SODIUM SERPL-SCNC: 139 MMOL/L — SIGNIFICANT CHANGE UP (ref 135–145)
SP GR SPEC: 1.01 — SIGNIFICANT CHANGE UP (ref 1.01–1.02)
UROBILINOGEN FLD QL: NEGATIVE MG/DL — SIGNIFICANT CHANGE UP
WBC # BLD: 11.3 K/UL — HIGH (ref 3.8–10.5)
WBC # FLD AUTO: 11.3 K/UL — HIGH (ref 3.8–10.5)
WBC UR QL: SIGNIFICANT CHANGE UP

## 2017-08-26 PROCEDURE — 73140 X-RAY EXAM OF FINGER(S): CPT | Mod: 26,RT

## 2017-08-26 PROCEDURE — 99284 EMERGENCY DEPT VISIT MOD MDM: CPT

## 2017-08-26 RX ORDER — PIPERACILLIN AND TAZOBACTAM 4; .5 G/20ML; G/20ML
3.38 INJECTION, POWDER, LYOPHILIZED, FOR SOLUTION INTRAVENOUS ONCE
Qty: 0 | Refills: 0 | Status: COMPLETED | OUTPATIENT
Start: 2017-08-26 | End: 2017-08-26

## 2017-08-26 RX ORDER — SODIUM CHLORIDE 9 MG/ML
3 INJECTION INTRAMUSCULAR; INTRAVENOUS; SUBCUTANEOUS ONCE
Qty: 0 | Refills: 0 | Status: COMPLETED | OUTPATIENT
Start: 2017-08-26 | End: 2017-08-26

## 2017-08-26 RX ORDER — VANCOMYCIN HCL 1 G
1000 VIAL (EA) INTRAVENOUS ONCE
Qty: 0 | Refills: 0 | Status: COMPLETED | OUTPATIENT
Start: 2017-08-26 | End: 2017-08-26

## 2017-08-26 RX ORDER — MORPHINE SULFATE 50 MG/1
2 CAPSULE, EXTENDED RELEASE ORAL ONCE
Qty: 0 | Refills: 0 | Status: DISCONTINUED | OUTPATIENT
Start: 2017-08-26 | End: 2017-08-26

## 2017-08-26 RX ORDER — SODIUM CHLORIDE 9 MG/ML
500 INJECTION INTRAMUSCULAR; INTRAVENOUS; SUBCUTANEOUS
Qty: 0 | Refills: 0 | Status: COMPLETED | OUTPATIENT
Start: 2017-08-26 | End: 2017-08-26

## 2017-08-26 RX ADMIN — SODIUM CHLORIDE 3 MILLILITER(S): 9 INJECTION INTRAMUSCULAR; INTRAVENOUS; SUBCUTANEOUS at 21:03

## 2017-08-26 RX ADMIN — PIPERACILLIN AND TAZOBACTAM 200 GRAM(S): 4; .5 INJECTION, POWDER, LYOPHILIZED, FOR SOLUTION INTRAVENOUS at 21:17

## 2017-08-26 RX ADMIN — SODIUM CHLORIDE 2000 MILLILITER(S): 9 INJECTION INTRAMUSCULAR; INTRAVENOUS; SUBCUTANEOUS at 21:16

## 2017-08-26 RX ADMIN — SODIUM CHLORIDE 2000 MILLILITER(S): 9 INJECTION INTRAMUSCULAR; INTRAVENOUS; SUBCUTANEOUS at 21:52

## 2017-08-26 RX ADMIN — Medication 250 MILLIGRAM(S): at 22:05

## 2017-08-26 RX ADMIN — MORPHINE SULFATE 2 MILLIGRAM(S): 50 CAPSULE, EXTENDED RELEASE ORAL at 23:12

## 2017-08-26 RX ADMIN — SODIUM CHLORIDE 2000 MILLILITER(S): 9 INJECTION INTRAMUSCULAR; INTRAVENOUS; SUBCUTANEOUS at 21:02

## 2017-08-26 RX ADMIN — SODIUM CHLORIDE 2000 MILLILITER(S): 9 INJECTION INTRAMUSCULAR; INTRAVENOUS; SUBCUTANEOUS at 21:37

## 2017-08-26 NOTE — ED ADULT TRIAGE NOTE - CHIEF COMPLAINT QUOTE
as per wife " he was here yesterday for abscess in the finger and the medication he took yesterday he is not getting better, every 4 to 6 hours he is complaining of pain and the finger is getting worse"

## 2017-08-27 ENCOUNTER — INPATIENT (INPATIENT)
Facility: HOSPITAL | Age: 37
LOS: 4 days | Discharge: HOME | End: 2017-09-01
Attending: INTERNAL MEDICINE | Admitting: INTERNAL MEDICINE
Payer: MEDICARE

## 2017-08-27 VITALS
HEART RATE: 80 BPM | TEMPERATURE: 99 F | SYSTOLIC BLOOD PRESSURE: 127 MMHG | DIASTOLIC BLOOD PRESSURE: 72 MMHG | RESPIRATION RATE: 16 BRPM | OXYGEN SATURATION: 98 %

## 2017-08-27 VITALS
TEMPERATURE: 98 F | SYSTOLIC BLOOD PRESSURE: 125 MMHG | OXYGEN SATURATION: 97 % | HEIGHT: 66 IN | WEIGHT: 162.92 LBS | HEART RATE: 89 BPM | DIASTOLIC BLOOD PRESSURE: 75 MMHG | RESPIRATION RATE: 16 BRPM

## 2017-08-27 DIAGNOSIS — Z98.890 OTHER SPECIFIED POSTPROCEDURAL STATES: Chronic | ICD-10-CM

## 2017-08-27 LAB
ALBUMIN SERPL ELPH-MCNC: 3.4 G/DL — SIGNIFICANT CHANGE UP (ref 3.3–5)
ALP SERPL-CCNC: 94 U/L — SIGNIFICANT CHANGE UP (ref 40–120)
ALT FLD-CCNC: 10 U/L — LOW (ref 12–78)
ANION GAP SERPL CALC-SCNC: 8 MMOL/L — SIGNIFICANT CHANGE UP (ref 5–17)
AST SERPL-CCNC: 18 U/L — SIGNIFICANT CHANGE UP (ref 15–37)
BASOPHILS # BLD AUTO: 0.1 K/UL — SIGNIFICANT CHANGE UP (ref 0–0.2)
BASOPHILS NFR BLD AUTO: 1.4 % — SIGNIFICANT CHANGE UP (ref 0–2)
BILIRUB SERPL-MCNC: 0.9 MG/DL — SIGNIFICANT CHANGE UP (ref 0.2–1.2)
BUN SERPL-MCNC: 11 MG/DL — SIGNIFICANT CHANGE UP (ref 7–23)
CALCIUM SERPL-MCNC: 8.4 MG/DL — LOW (ref 8.5–10.1)
CHLORIDE SERPL-SCNC: 105 MMOL/L — SIGNIFICANT CHANGE UP (ref 96–108)
CO2 SERPL-SCNC: 25 MMOL/L — SIGNIFICANT CHANGE UP (ref 22–31)
CREAT SERPL-MCNC: 0.85 MG/DL — SIGNIFICANT CHANGE UP (ref 0.5–1.3)
CRP SERPL-MCNC: 4 MG/DL — HIGH (ref 0–0.4)
EOSINOPHIL # BLD AUTO: 0.2 K/UL — SIGNIFICANT CHANGE UP (ref 0–0.5)
EOSINOPHIL NFR BLD AUTO: 3.1 % — SIGNIFICANT CHANGE UP (ref 0–6)
GLUCOSE SERPL-MCNC: 98 MG/DL — SIGNIFICANT CHANGE UP (ref 70–99)
HCT VFR BLD CALC: 50.7 % — HIGH (ref 39–50)
HGB BLD-MCNC: 15.7 G/DL — SIGNIFICANT CHANGE UP (ref 13–17)
LACTATE SERPL-SCNC: 0.7 MMOL/L — SIGNIFICANT CHANGE UP (ref 0.7–2)
LYMPHOCYTES # BLD AUTO: 1.5 K/UL — SIGNIFICANT CHANGE UP (ref 1–3.3)
LYMPHOCYTES # BLD AUTO: 18.7 % — SIGNIFICANT CHANGE UP (ref 13–44)
MCHC RBC-ENTMCNC: 26.8 PG — LOW (ref 27–34)
MCHC RBC-ENTMCNC: 31 GM/DL — LOW (ref 32–36)
MCV RBC AUTO: 86.4 FL — SIGNIFICANT CHANGE UP (ref 80–100)
MONOCYTES # BLD AUTO: 0.5 K/UL — SIGNIFICANT CHANGE UP (ref 0–0.9)
MONOCYTES NFR BLD AUTO: 6 % — SIGNIFICANT CHANGE UP (ref 2–14)
NEUTROPHILS # BLD AUTO: 5.8 K/UL — SIGNIFICANT CHANGE UP (ref 1.8–7.4)
NEUTROPHILS NFR BLD AUTO: 70.9 % — SIGNIFICANT CHANGE UP (ref 43–77)
PLATELET # BLD AUTO: 302 K/UL — SIGNIFICANT CHANGE UP (ref 150–400)
POTASSIUM SERPL-MCNC: 4.2 MMOL/L — SIGNIFICANT CHANGE UP (ref 3.5–5.3)
POTASSIUM SERPL-SCNC: 4.2 MMOL/L — SIGNIFICANT CHANGE UP (ref 3.5–5.3)
PROCALCITONIN SERPL-MCNC: <0.05 NG/ML — HIGH (ref 0–0.04)
PROT SERPL-MCNC: 7.4 GM/DL — SIGNIFICANT CHANGE UP (ref 6–8.3)
RBC # BLD: 5.86 M/UL — HIGH (ref 4.2–5.8)
RBC # FLD: 12.2 % — SIGNIFICANT CHANGE UP (ref 11–15)
SODIUM SERPL-SCNC: 138 MMOL/L — SIGNIFICANT CHANGE UP (ref 135–145)
WBC # BLD: 8.2 K/UL — SIGNIFICANT CHANGE UP (ref 3.8–10.5)
WBC # FLD AUTO: 8.2 K/UL — SIGNIFICANT CHANGE UP (ref 3.8–10.5)

## 2017-08-27 PROCEDURE — 99284 EMERGENCY DEPT VISIT MOD MDM: CPT

## 2017-08-27 RX ORDER — PIPERACILLIN AND TAZOBACTAM 4; .5 G/20ML; G/20ML
3.38 INJECTION, POWDER, LYOPHILIZED, FOR SOLUTION INTRAVENOUS EVERY 8 HOURS
Qty: 0 | Refills: 0 | Status: DISCONTINUED | OUTPATIENT
Start: 2017-08-27 | End: 2017-09-01

## 2017-08-27 RX ORDER — CARBIDOPA AND LEVODOPA 25; 100 MG/1; MG/1
1 TABLET ORAL THREE TIMES A DAY
Qty: 0 | Refills: 0 | Status: DISCONTINUED | OUTPATIENT
Start: 2017-08-27 | End: 2017-08-30

## 2017-08-27 RX ORDER — PRAMIPEXOLE DIHYDROCHLORIDE 0.12 MG/1
1 TABLET ORAL
Qty: 0 | Refills: 0 | COMMUNITY

## 2017-08-27 RX ORDER — TRIHEXYPHENIDYL HCL 2 MG
1 TABLET ORAL
Qty: 0 | Refills: 0 | COMMUNITY

## 2017-08-27 RX ORDER — PRAMIPEXOLE DIHYDROCHLORIDE 0.12 MG/1
1.5 TABLET ORAL THREE TIMES A DAY
Qty: 0 | Refills: 0 | Status: DISCONTINUED | OUTPATIENT
Start: 2017-08-27 | End: 2017-09-01

## 2017-08-27 RX ORDER — ACETAMINOPHEN 500 MG
650 TABLET ORAL ONCE
Qty: 0 | Refills: 0 | Status: COMPLETED | OUTPATIENT
Start: 2017-08-27 | End: 2017-08-27

## 2017-08-27 RX ORDER — VANCOMYCIN HCL 1 G
1000 VIAL (EA) INTRAVENOUS EVERY 8 HOURS
Qty: 0 | Refills: 0 | Status: DISCONTINUED | OUTPATIENT
Start: 2017-08-28 | End: 2017-09-01

## 2017-08-27 RX ORDER — PIPERACILLIN AND TAZOBACTAM 4; .5 G/20ML; G/20ML
3.38 INJECTION, POWDER, LYOPHILIZED, FOR SOLUTION INTRAVENOUS ONCE
Qty: 0 | Refills: 0 | Status: COMPLETED | OUTPATIENT
Start: 2017-08-27 | End: 2017-08-27

## 2017-08-27 RX ORDER — TRIHEXYPHENIDYL HCL 2 MG
2 TABLET ORAL
Qty: 0 | Refills: 0 | Status: DISCONTINUED | OUTPATIENT
Start: 2017-08-27 | End: 2017-08-30

## 2017-08-27 RX ORDER — SODIUM CHLORIDE 9 MG/ML
3000 INJECTION INTRAMUSCULAR; INTRAVENOUS; SUBCUTANEOUS ONCE
Qty: 0 | Refills: 0 | Status: COMPLETED | OUTPATIENT
Start: 2017-08-27 | End: 2017-08-27

## 2017-08-27 RX ORDER — SODIUM CHLORIDE 9 MG/ML
1000 INJECTION INTRAMUSCULAR; INTRAVENOUS; SUBCUTANEOUS
Qty: 0 | Refills: 0 | Status: DISCONTINUED | OUTPATIENT
Start: 2017-08-27 | End: 2017-09-01

## 2017-08-27 RX ORDER — CARBIDOPA AND LEVODOPA 25; 100 MG/1; MG/1
1.5 TABLET ORAL
Qty: 0 | Refills: 0 | COMMUNITY

## 2017-08-27 RX ORDER — ACETAMINOPHEN 500 MG
650 TABLET ORAL EVERY 6 HOURS
Qty: 0 | Refills: 0 | Status: DISCONTINUED | OUTPATIENT
Start: 2017-08-27 | End: 2017-09-01

## 2017-08-27 RX ORDER — MORPHINE SULFATE 50 MG/1
2 CAPSULE, EXTENDED RELEASE ORAL EVERY 4 HOURS
Qty: 0 | Refills: 0 | Status: DISCONTINUED | OUTPATIENT
Start: 2017-08-27 | End: 2017-08-27

## 2017-08-27 RX ORDER — VANCOMYCIN HCL 1 G
1000 VIAL (EA) INTRAVENOUS ONCE
Qty: 0 | Refills: 0 | Status: COMPLETED | OUTPATIENT
Start: 2017-08-27 | End: 2017-08-27

## 2017-08-27 RX ADMIN — Medication 250 MILLIGRAM(S): at 18:46

## 2017-08-27 RX ADMIN — PRAMIPEXOLE DIHYDROCHLORIDE 1.5 MILLIGRAM(S): 0.12 TABLET ORAL at 22:15

## 2017-08-27 RX ADMIN — SODIUM CHLORIDE 100 MILLILITER(S): 9 INJECTION INTRAMUSCULAR; INTRAVENOUS; SUBCUTANEOUS at 20:04

## 2017-08-27 RX ADMIN — PIPERACILLIN AND TAZOBACTAM 25 GRAM(S): 4; .5 INJECTION, POWDER, LYOPHILIZED, FOR SOLUTION INTRAVENOUS at 18:06

## 2017-08-27 RX ADMIN — CARBIDOPA AND LEVODOPA 1 TABLET(S): 25; 100 TABLET ORAL at 22:15

## 2017-08-27 RX ADMIN — PIPERACILLIN AND TAZOBACTAM 25 GRAM(S): 4; .5 INJECTION, POWDER, LYOPHILIZED, FOR SOLUTION INTRAVENOUS at 22:15

## 2017-08-27 RX ADMIN — Medication 650 MILLIGRAM(S): at 18:06

## 2017-08-27 RX ADMIN — SODIUM CHLORIDE 3000 MILLILITER(S): 9 INJECTION INTRAMUSCULAR; INTRAVENOUS; SUBCUTANEOUS at 18:06

## 2017-08-27 NOTE — ED PROVIDER NOTE - MUSCULOSKELETAL, MLM
RIGHT 3RD DIGIT WITH NON-FUSIFORM EDEMA, ERYTHEMA, INDURATION AND TENDERENESS DISTAL TO PIP, ROM INTACT INCLUDING EXTENSION, NEUROVASCULARLY INTACT Spine appears normal, range of motion is not limited, no muscle or joint tenderness

## 2017-08-27 NOTE — ED PROVIDER NOTE - MEDICAL DECISION MAKING DETAILS
bacteremia likely from finger infection. spoke to dr. shelley (plastics), will see pt in morning, no emergent drainage need but iv abx given bacteremia. afebrile and neg lactate but pt more sleepy today per wife.

## 2017-08-27 NOTE — ED PROVIDER NOTE - MEDICAL DECISION MAKING DETAILS
Labs and imaging reviewed. Patient received ivfs, pain mgmt and iv abx. Plan discussed with Dr. Carmichael. Patient currently in good condition and he is now discharged with care instructions. Abx changed to Augmentin. He will follow up with Dr. Carmichael on Monday. Discussed results and outcome of testing with the patient.  Patient advised to please follow up with their primary care doctor within the next 24 hours and return to the Emergency Department for worsening symptoms or any other concerns.  Patient advised that their doctor may call  to follow up on the specific results of the tests performed today in the emergency department.

## 2017-08-27 NOTE — ED PROVIDER NOTE - OBJECTIVE STATEMENT
36 y/o male hx of Parkinson's, right third finger infection on multiple different abx over past few days (bactrim switched to augmentin last night) recalled for gram positive rods in blood culture. pt has been sleepy for most of today, extreme pain to finger, chills at home. no other symptoms.

## 2017-08-27 NOTE — ED ADULT NURSE NOTE - OBJECTIVE STATEMENT
Patient received a phone call to return to the ER after having positive blood culture results. Right middle finger noted to be swollen at this time.

## 2017-08-27 NOTE — H&P ADULT - HISTORY OF PRESENT ILLNESS
· HPI  36 y/o male hx of Parkinson's, right third finger infection on multiple different abx over past few days (bactrim switched to augmentin last night) recalled for gram positive rods in blood culture. pt has been sleepy for most of today, extreme pain to finger, chills at home. no other symptoms.

## 2017-08-27 NOTE — ED PROVIDER NOTE - OBJECTIVE STATEMENT
Pertinent PMH/PSH/FHx/SHx and Review of Systems contained within:  38 yo m with PMH of Parkinson's presents in ED c/o right 3rd digit pain and swelling. Patient has only taken 1 dose of medication since discharge. Patient evaluated and treated for same symptoms yesterday but states that pain has not gone away and that he now has upset stomach and watery diarrhea. No aggravating or relieving factors, No fever/chills, No photophobia/eye pain/changes in vision, No ear pain/sore throat/dysphagia, No chest pain/palpitations, no SOB/cough/wheeze/stridor, No N/V, no dysuria/frequency/discharge, No neck/back pain, no rash, no changes in neurological status/function. Pertinent PMH/PSH/FHx/SHx and Review of Systems contained within:  38 yo m with PMH of Parkinson's presents in ED c/o right 3rd digit pain and swelling. Patient evaluated and treated for same symptoms yesterday. He took 1 dose of abx at home but states that pain has not gone away and that he now has upset stomach and watery diarrhea. No aggravating or relieving factors, No fever/chills, No photophobia/eye pain/changes in vision, No ear pain/sore throat/dysphagia, No chest pain/palpitations, no SOB/cough/wheeze/stridor, No N/V, no dysuria/frequency/discharge, No neck/back pain, no rash, no changes in neurological status/function.

## 2017-08-27 NOTE — H&P ADULT - NSHPLABSRESULTS_GEN_ALL_CORE
15.7   8.2   )-----------( 302      ( 27 Aug 2017 18:06 )             50.7         138  |  105  |  11  ----------------------------<  98  4.2   |  25  |  0.85    Ca    8.4<L>      27 Aug 2017 18:06    TPro  7.4  /  Alb  3.4  /  TBili  0.9  /  DBili  x   /  AST  18  /  ALT  10<L>  /  AlkPhos  94      PT/INR - ( 26 Aug 2017 21:03 )   PT: 11.8 sec;   INR: 1.08 ratio         PTT - ( 26 Aug 2017 21:03 )  PTT:44.7 sec  Urinalysis Basic - ( 26 Aug 2017 22:06 )    Color: Yellow / Appearance: Clear / S.015 / pH: x  Gluc: x / Ketone: Negative  / Bili: Negative / Urobili: Negative mg/dL   Blood: x / Protein: Negative mg/dL / Nitrite: Negative   Leuk Esterase: Negative / RBC: 0-2 /HPF / WBC 3-5   Sq Epi: x / Non Sq Epi: x / Bacteria: Few      CAPILLARY BLOOD GLUCOSE                Urine Culture:      Blood Culture:

## 2017-08-27 NOTE — ED ADULT TRIAGE NOTE - CHIEF COMPLAINT QUOTE
Pt called to return for admission because of positive blood culture. He was discharge from the ed today. He was treated for finger abscess

## 2017-08-28 LAB
-  CANDIDA ALBICANS: SIGNIFICANT CHANGE UP
-  CANDIDA GLABRATA: SIGNIFICANT CHANGE UP
-  CANDIDA KRUSEI: SIGNIFICANT CHANGE UP
-  CANDIDA PARAPSILOSIS: SIGNIFICANT CHANGE UP
-  CANDIDA TROPICALIS: SIGNIFICANT CHANGE UP
-  COAGULASE NEGATIVE STAPHYLOCOCCUS: SIGNIFICANT CHANGE UP
-  K. PNEUMONIAE GROUP: SIGNIFICANT CHANGE UP
-  KPC RESISTANCE GENE: SIGNIFICANT CHANGE UP
-  STREPTOCOCCUS SP. (NOT GRP A, B OR S PNEUMONIAE): SIGNIFICANT CHANGE UP
A BAUMANNII DNA SPEC QL NAA+PROBE: SIGNIFICANT CHANGE UP
ANION GAP SERPL CALC-SCNC: 8 MMOL/L — SIGNIFICANT CHANGE UP (ref 5–17)
BASOPHILS # BLD AUTO: 0.1 K/UL — SIGNIFICANT CHANGE UP (ref 0–0.2)
BASOPHILS NFR BLD AUTO: 1.2 % — SIGNIFICANT CHANGE UP (ref 0–2)
BUN SERPL-MCNC: 9 MG/DL — SIGNIFICANT CHANGE UP (ref 7–23)
CALCIUM SERPL-MCNC: 8.2 MG/DL — LOW (ref 8.5–10.1)
CHLORIDE SERPL-SCNC: 106 MMOL/L — SIGNIFICANT CHANGE UP (ref 96–108)
CO2 SERPL-SCNC: 24 MMOL/L — SIGNIFICANT CHANGE UP (ref 22–31)
CREAT SERPL-MCNC: 0.77 MG/DL — SIGNIFICANT CHANGE UP (ref 0.5–1.3)
CULTURE RESULTS: NO GROWTH — SIGNIFICANT CHANGE UP
CULTURE RESULTS: SIGNIFICANT CHANGE UP
CULTURE RESULTS: SIGNIFICANT CHANGE UP
E CLOAC COMP DNA BLD POS QL NAA+PROBE: SIGNIFICANT CHANGE UP
E COLI DNA BLD POS QL NAA+NON-PROBE: SIGNIFICANT CHANGE UP
ENTEROCOC DNA BLD POS QL NAA+NON-PROBE: SIGNIFICANT CHANGE UP
ENTEROCOC DNA BLD POS QL NAA+NON-PROBE: SIGNIFICANT CHANGE UP
EOSINOPHIL # BLD AUTO: 0.3 K/UL — SIGNIFICANT CHANGE UP (ref 0–0.5)
EOSINOPHIL NFR BLD AUTO: 3.8 % — SIGNIFICANT CHANGE UP (ref 0–6)
GLUCOSE SERPL-MCNC: 120 MG/DL — HIGH (ref 70–99)
GP B STREP DNA BLD POS QL NAA+NON-PROBE: SIGNIFICANT CHANGE UP
GRAM STN FLD: SIGNIFICANT CHANGE UP
GRAM STN FLD: SIGNIFICANT CHANGE UP
HAEM INFLU DNA BLD POS QL NAA+NON-PROBE: SIGNIFICANT CHANGE UP
HCT VFR BLD CALC: 46.8 % — SIGNIFICANT CHANGE UP (ref 39–50)
HGB BLD-MCNC: 14.4 G/DL — SIGNIFICANT CHANGE UP (ref 13–17)
K OXYTOCA DNA BLD POS QL NAA+NON-PROBE: SIGNIFICANT CHANGE UP
L MONOCYTOG DNA BLD POS QL NAA+NON-PROBE: SIGNIFICANT CHANGE UP
LYMPHOCYTES # BLD AUTO: 1.6 K/UL — SIGNIFICANT CHANGE UP (ref 1–3.3)
LYMPHOCYTES # BLD AUTO: 20.4 % — SIGNIFICANT CHANGE UP (ref 13–44)
MCHC RBC-ENTMCNC: 26.8 PG — LOW (ref 27–34)
MCHC RBC-ENTMCNC: 30.9 GM/DL — LOW (ref 32–36)
MCV RBC AUTO: 86.9 FL — SIGNIFICANT CHANGE UP (ref 80–100)
METHOD TYPE: SIGNIFICANT CHANGE UP
MONOCYTES # BLD AUTO: 0.6 K/UL — SIGNIFICANT CHANGE UP (ref 0–0.9)
MONOCYTES NFR BLD AUTO: 7.2 % — SIGNIFICANT CHANGE UP (ref 2–14)
MRSA SPEC QL CULT: SIGNIFICANT CHANGE UP
MSSA DNA SPEC QL NAA+PROBE: SIGNIFICANT CHANGE UP
N MEN ISLT CULT: SIGNIFICANT CHANGE UP
NEUTROPHILS # BLD AUTO: 5.3 K/UL — SIGNIFICANT CHANGE UP (ref 1.8–7.4)
NEUTROPHILS NFR BLD AUTO: 67.4 % — SIGNIFICANT CHANGE UP (ref 43–77)
ORGANISM # SPEC MICROSCOPIC CNT: SIGNIFICANT CHANGE UP
ORGANISM # SPEC MICROSCOPIC CNT: SIGNIFICANT CHANGE UP
P AERUGINOSA DNA BLD POS NAA+NON-PROBE: SIGNIFICANT CHANGE UP
PLATELET # BLD AUTO: 296 K/UL — SIGNIFICANT CHANGE UP (ref 150–400)
POTASSIUM SERPL-MCNC: 4.2 MMOL/L — SIGNIFICANT CHANGE UP (ref 3.5–5.3)
POTASSIUM SERPL-SCNC: 4.2 MMOL/L — SIGNIFICANT CHANGE UP (ref 3.5–5.3)
PROTEUS SP DNA BLD POS QL NAA+NON-PROBE: SIGNIFICANT CHANGE UP
RBC # BLD: 5.38 M/UL — SIGNIFICANT CHANGE UP (ref 4.2–5.8)
RBC # FLD: 12.1 % — SIGNIFICANT CHANGE UP (ref 11–15)
S MARCESCENS DNA BLD POS NAA+NON-PROBE: SIGNIFICANT CHANGE UP
S PNEUM DNA BLD POS QL NAA+NON-PROBE: SIGNIFICANT CHANGE UP
S PYO DNA BLD POS QL NAA+NON-PROBE: SIGNIFICANT CHANGE UP
SODIUM SERPL-SCNC: 138 MMOL/L — SIGNIFICANT CHANGE UP (ref 135–145)
SPECIMEN SOURCE: SIGNIFICANT CHANGE UP
WBC # BLD: 7.9 K/UL — SIGNIFICANT CHANGE UP (ref 3.8–10.5)
WBC # FLD AUTO: 7.9 K/UL — SIGNIFICANT CHANGE UP (ref 3.8–10.5)

## 2017-08-28 RX ORDER — OXYCODONE AND ACETAMINOPHEN 5; 325 MG/1; MG/1
1 TABLET ORAL EVERY 6 HOURS
Qty: 0 | Refills: 0 | Status: DISCONTINUED | OUTPATIENT
Start: 2017-08-28 | End: 2017-09-01

## 2017-08-28 RX ORDER — BACITRACIN ZINC 500 UNIT/G
1 OINTMENT IN PACKET (EA) TOPICAL
Qty: 0 | Refills: 0 | Status: DISCONTINUED | OUTPATIENT
Start: 2017-08-28 | End: 2017-09-01

## 2017-08-28 RX ADMIN — PIPERACILLIN AND TAZOBACTAM 25 GRAM(S): 4; .5 INJECTION, POWDER, LYOPHILIZED, FOR SOLUTION INTRAVENOUS at 05:27

## 2017-08-28 RX ADMIN — Medication 250 MILLIGRAM(S): at 05:28

## 2017-08-28 RX ADMIN — PIPERACILLIN AND TAZOBACTAM 25 GRAM(S): 4; .5 INJECTION, POWDER, LYOPHILIZED, FOR SOLUTION INTRAVENOUS at 15:01

## 2017-08-28 RX ADMIN — Medication 250 MILLIGRAM(S): at 14:58

## 2017-08-28 RX ADMIN — PRAMIPEXOLE DIHYDROCHLORIDE 1.5 MILLIGRAM(S): 0.12 TABLET ORAL at 22:01

## 2017-08-28 RX ADMIN — OXYCODONE AND ACETAMINOPHEN 1 TABLET(S): 5; 325 TABLET ORAL at 17:38

## 2017-08-28 RX ADMIN — Medication 2 MILLIGRAM(S): at 05:26

## 2017-08-28 RX ADMIN — PRAMIPEXOLE DIHYDROCHLORIDE 1.5 MILLIGRAM(S): 0.12 TABLET ORAL at 05:27

## 2017-08-28 RX ADMIN — PIPERACILLIN AND TAZOBACTAM 25 GRAM(S): 4; .5 INJECTION, POWDER, LYOPHILIZED, FOR SOLUTION INTRAVENOUS at 22:02

## 2017-08-28 RX ADMIN — Medication 250 MILLIGRAM(S): at 22:02

## 2017-08-28 RX ADMIN — Medication 2 MILLIGRAM(S): at 17:31

## 2017-08-28 RX ADMIN — CARBIDOPA AND LEVODOPA 1 TABLET(S): 25; 100 TABLET ORAL at 15:14

## 2017-08-28 RX ADMIN — CARBIDOPA AND LEVODOPA 1 TABLET(S): 25; 100 TABLET ORAL at 05:27

## 2017-08-28 RX ADMIN — OXYCODONE AND ACETAMINOPHEN 1 TABLET(S): 5; 325 TABLET ORAL at 18:08

## 2017-08-28 RX ADMIN — CARBIDOPA AND LEVODOPA 1 TABLET(S): 25; 100 TABLET ORAL at 22:01

## 2017-08-28 RX ADMIN — PRAMIPEXOLE DIHYDROCHLORIDE 1.5 MILLIGRAM(S): 0.12 TABLET ORAL at 17:32

## 2017-08-28 NOTE — CONSULT NOTE ADULT - SUBJECTIVE AND OBJECTIVE BOX
HPI:  · HPI  36 y/o male hx of Parkinson's, right third finger infection on multiple different abx over past few days (bactrim switched to augmentin last night) recalled for gram positive rods in blood culture. pt has been sleepy for most of today, extreme pain to finger, chills at home. no other symptoms. (27 Aug 2017 19:16)      PAST MEDICAL & SURGICAL HISTORY:  Arthritis  Parkinson disease  H/O inguinal hernia repair      SOCHX:   tobacco,  -  alcohol    FMHX: FA/MO  - contributory       Recent Travel:    Immunizations:    Allergies    No Known Allergies    Intolerances        MEDICATIONS  (STANDING):  carbidopa/levodopa CR 25/100 1 Tablet(s) Oral three times a day  pramipexole 1.5 milliGRAM(s) Oral three times a day  trihexyphenidyl 2 milliGRAM(s) Oral two times a day  sodium chloride 0.9%. 1000 milliLiter(s) (100 mL/Hr) IV Continuous <Continuous>  piperacillin/tazobactam IVPB. 3.375 Gram(s) IV Intermittent every 8 hours  vancomycin  IVPB 1000 milliGRAM(s) IV Intermittent every 8 hours    MEDICATIONS  (PRN):  acetaminophen   Tablet 650 milliGRAM(s) Oral every 6 hours PRN For Temp greater than 38.5 C (101.3 F)      REVIEW OF SYSTEMS:  CONSTITUTIONAL: No fever, weight loss, or fatigue  EYES: No eye pain, visual disturbances, or discharge  ENMT:  No difficulty hearing, tinnitus, vertigo; No sinus or throat pain  NECK: No pain or stiffness  BREASTS: No pain, masses, or nipple discharge  RESPIRATORY: No cough, wheezing, chills or hemoptysis; No shortness of breath  CARDIOVASCULAR: No chest pain, palpitations, dizziness, or leg swelling  GASTROINTESTINAL: No abdominal or epigastric pain. No nausea, vomiting, or hematemesis; No diarrhea or constipation. No melena or hematochezia.  GENITOURINARY: No dysuria, frequency, hematuria, or incontinence  NEUROLOGICAL: No headaches, memory loss, loss of strength, numbness, or tremors  SKIN: No itching, burning, rashes, or lesions   LYMPH NODES: No enlarged glands  ENDOCRINE: No heat or cold intolerance; No hair loss  MUSCULOSKELETAL: No joint pain or swelling; No muscle, back, or extremity pain  PSYCHIATRIC: No depression, anxiety, mood swings, or difficulty sleeping  HEME/LYMPH: No easy bruising, or bleeding gums  ALLERGY AND IMMUNOLOGIC: No hives or eczema    VITAL SIGNS:    T(C): 36.4 (17 @ 13:13), Max: 36.8 (17 @ 16:49)  T(F): 97.6 (17 @ 13:13), Max: 98.2 (17 @ 16:49)  HR: 75 (17 @ 13:13) (65 - 92)  BP: 130/60 (17 @ 13:13) (114/69 - 137/78)  RR: 17 (17 @ 13:13) (16 - 18)  SpO2: 96% (17 @ 13:13) (96% - 100%)    PHYSICAL EXAM:    GENERAL: NAD, well-groomed, well-developed  HEAD:  Atraumatic, Normocephalic  EYES: EOMI, PERRLA, conjunctiva and sclera clear  ENMT: No tonsillar erythema, exudates, or enlargement; Moist mucous membranes, Good dentition, No lesions  NECK: Supple, No JVD, Normal thyroid  NERVOUS SYSTEM:  Alert & Oriented X3, Good concentration; Motor Strength 5/5 B/L upper and lower extremities; DTRs 2+ intact and symmetric  CHEST/LUNG: Clear to percussion bilaterally; No rales, rhonchi, wheezing bilaterally  HEART: Regular rate and rhythm; No murmurs, rubs, or gallops  ABDOMEN: Soft, Nontender, Nondistended; Bowel sounds present  EXTREMITIES:  2+ Peripheral Pulses, No clubbing, cyanosis, or edema  LYMPH: No lymphadenopathy noted  SKIN: No rashes or lesions  BACK: no pressor sore     LABS:                         14.4   7.9   )-----------( 296      ( 28 Aug 2017 07:45 )             46.8         138  |  106  |  9   ----------------------------<  120<H>  4.2   |  24  |  0.77    Ca    8.2<L>      28 Aug 2017 07:45    TPro  7.4  /  Alb  3.4  /  TBili  0.9  /  DBili  x   /  AST  18  /  ALT  10<L>  /  AlkPhos  94  08-27    LIVER FUNCTIONS - ( 27 Aug 2017 18:06 )  Alb: 3.4 g/dL / Pro: 7.4 gm/dL / ALK PHOS: 94 U/L / ALT: 10 U/L / AST: 18 U/L / GGT: x           PT/INR - ( 26 Aug 2017 21:03 )   PT: 11.8 sec;   INR: 1.08 ratio         PTT - ( 26 Aug 2017 21:03 )  PTT:44.7 sec  Urinalysis Basic - ( 26 Aug 2017 22:06 )    Color: Yellow / Appearance: Clear / S.015 / pH: x  Gluc: x / Ketone: Negative  / Bili: Negative / Urobili: Negative mg/dL   Blood: x / Protein: Negative mg/dL / Nitrite: Negative   Leuk Esterase: Negative / RBC: 0-2 /HPF / WBC 3-5   Sq Epi: x / Non Sq Epi: x / Bacteria: Few                                        Radiology: HPI:  · HPI  38 y/o male hx of Parkinson's, right third finger infection on multiple different abx over past few days (bactrim switched to augmentin last night) recalled for gram positive rods in blood culture. pt has been sleepy for most of today, extreme pain to finger, chills at home. no other symptoms. (27 Aug 2017 19:16)      PAST MEDICAL & SURGICAL HISTORY:  Arthritis  Parkinson disease  H/O inguinal hernia repair      SOCHX:   no tobacco,  - no  alcohol    FMHX: FA/MO  -non contributory   3 kid 8;6;11/    Recent Travel:    Immunizations:    Allergies    No Known Allergies    Intolerances        MEDICATIONS  (STANDING):  carbidopa/levodopa CR 25/100 1 Tablet(s) Oral three times a day  pramipexole 1.5 milliGRAM(s) Oral three times a day  trihexyphenidyl 2 milliGRAM(s) Oral two times a day  sodium chloride 0.9%. 1000 milliLiter(s) (100 mL/Hr) IV Continuous <Continuous>  piperacillin/tazobactam IVPB. 3.375 Gram(s) IV Intermittent every 8 hours  vancomycin  IVPB 1000 milliGRAM(s) IV Intermittent every 8 hours    MEDICATIONS  (PRN):  acetaminophen   Tablet 650 milliGRAM(s) Oral every 6 hours PRN For Temp greater than 38.5 C (101.3 F)      REVIEW OF SYSTEMS:  CONSTITUTIONAL: No fever, weight loss, or fatigue  EYES: No eye pain, visual disturbances, or discharge  ENMT:  No difficulty hearing, tinnitus, vertigo; No sinus or throat pain  NECK: No pain or stiffness  RESPIRATORY: No cough, wheezing, chills or hemoptysis; No shortness of breath  CARDIOVASCULAR: No chest pain, palpitations, dizziness, or leg swelling  GASTROINTESTINAL: No abdominal or epigastric pain. No nausea, vomiting, or hematemesis; No diarrhea or constipation. No melena or hematochezia.  GENITOURINARY: No dysuria, frequency, hematuria, or incontinence  NEUROLOGICAL: tremors ; flat affect ; low speech   SKIN: No itching, burning, rashes, or lesions   LYMPH NODES: No enlarged glands  ENDOCRINE: No heat or cold intolerance; No hair loss  MUSCULOSKELETAL: No joint pain or swelling; No muscle, back, or extremity pain  rt hand infected finger  PSYCHIATRIC: No depression, anxiety, mood swings, or difficulty sleeping  HEME/LYMPH: No easy bruising, or bleeding gums  ALLERGY AND IMMUNOLOGIC: No hives or eczema    VITAL SIGNS:    T(C): 36.4 (17 @ 13:13), Max: 36.8 (17 @ 16:49)  T(F): 97.6 (17 @ 13:13), Max: 98.2 (17 @ 16:49)  HR: 75 (17 @ 13:13) (65 - 92)  BP: 130/60 (17 @ 13:13) (114/69 - 137/78)  RR: 17 (17 @ 13:13) (16 - 18)  SpO2: 96% (17 @ 13:13) (96% - 100%)    PHYSICAL EXAM:    GENERAL: NAD, well-groomed, well-developed  HEAD:  Atraumatic, Normocephalic  EYES: EOMI, PERRLA, conjunctiva and sclera clear  ENMT: No tonsillar erythema, exudates, or enlargement; Moist mucous membranes, Good dentition, No lesions  NECK: Supple, No JVD, Normal thyroid  NERVOUS SYSTEM:  Alert & Oriented X3, Good concentration;   flat affect   marked tremor   CHEST/LUNG: Clear to percussion bilaterally; No rales, rhonchi, wheezing bilaterally  HEART: Regular rate and rhythm; No murmurs, rubs, or gallops  ABDOMEN: Soft, Nontender, Nondistended; Bowel sounds present  EXTREMITIES:  2+ Peripheral Pulses, No clubbing, cyanosis, or edema  rt finger ac cellulitis and abscess  LYMPH: No lymphadenopathy noted  SKIN: No rashes or lesions; cellulitis finger  BACK: no pressor sore     LABS:                         14.4   7.9   )-----------( 296      ( 28 Aug 2017 07:45 )             46.8         138  |  106  |  9   ----------------------------<  120<H>  4.2   |  24  |  0.77    Ca    8.2<L>      28 Aug 2017 07:45    TPro  7.4  /  Alb  3.4  /  TBili  0.9  /  DBili  x   /  AST  18  /  ALT  10<L>  /  AlkPhos  94      LIVER FUNCTIONS - ( 27 Aug 2017 18:06 )  Alb: 3.4 g/dL / Pro: 7.4 gm/dL / ALK PHOS: 94 U/L / ALT: 10 U/L / AST: 18 U/L / GGT: x           PT/INR - ( 26 Aug 2017 21:03 )   PT: 11.8 sec;   INR: 1.08 ratio         PTT - ( 26 Aug 2017 21:03 )  PTT:44.7 sec  Urinalysis Basic - ( 26 Aug 2017 22:06 )    Color: Yellow / Appearance: Clear / S.015 / pH: x  Gluc: x / Ketone: Negative  / Bili: Negative / Urobili: Negative mg/dL   Blood: x / Protein: Negative mg/dL / Nitrite: Negative   Leuk Esterase: Negative / RBC: 0-2 /HPF / WBC 3-5   Sq Epi: x / Non Sq Epi: x / Bacteria: Few                                        Radiology:    < from: Xray Finger, Right Hand (17 @ 21:35) >  IMPRESSION:  No acute fracture or dislocation. Diffuse soft tissue   swelling of the third digit.                LILO LYLES M.D., ATTENDING RADIOLOGIST  This document has been electronically signed. Aug 27 2017  9:12AM                < end of copied text >

## 2017-08-28 NOTE — PROGRESS NOTE ADULT - ASSESSMENT
Patient is a 38 y/o M PMH Parkinson's Disease p/w weakness, stiffness, vomiting, diarrhea.Was seen in ED earlier, blood cultures came back pos. for Gram pos. rods and cocci in clusters. On Vancomycin and Zosyn, ID and Hand surgery f/u. Continue the IV antibiotics.

## 2017-08-29 LAB
VANCOMYCIN TROUGH SERPL-MCNC: 10.2 UG/ML — SIGNIFICANT CHANGE UP (ref 10–20)
VANCOMYCIN TROUGH SERPL-MCNC: 14.7 UG/ML — SIGNIFICANT CHANGE UP (ref 10–20)

## 2017-08-29 RX ORDER — PRAMIPEXOLE DIHYDROCHLORIDE 0.12 MG/1
1 TABLET ORAL AT BEDTIME
Qty: 0 | Refills: 0 | Status: DISCONTINUED | OUTPATIENT
Start: 2017-08-29 | End: 2017-09-01

## 2017-08-29 RX ORDER — CARBIDOPA AND LEVODOPA 25; 100 MG/1; MG/1
1 TABLET ORAL AT BEDTIME
Qty: 0 | Refills: 0 | Status: DISCONTINUED | OUTPATIENT
Start: 2017-08-29 | End: 2017-09-01

## 2017-08-29 RX ADMIN — PIPERACILLIN AND TAZOBACTAM 25 GRAM(S): 4; .5 INJECTION, POWDER, LYOPHILIZED, FOR SOLUTION INTRAVENOUS at 05:40

## 2017-08-29 RX ADMIN — CARBIDOPA AND LEVODOPA 1 TABLET(S): 25; 100 TABLET ORAL at 14:53

## 2017-08-29 RX ADMIN — CARBIDOPA AND LEVODOPA 1 TABLET(S): 25; 100 TABLET ORAL at 21:27

## 2017-08-29 RX ADMIN — OXYCODONE AND ACETAMINOPHEN 1 TABLET(S): 5; 325 TABLET ORAL at 03:47

## 2017-08-29 RX ADMIN — PIPERACILLIN AND TAZOBACTAM 25 GRAM(S): 4; .5 INJECTION, POWDER, LYOPHILIZED, FOR SOLUTION INTRAVENOUS at 16:17

## 2017-08-29 RX ADMIN — Medication 250 MILLIGRAM(S): at 21:27

## 2017-08-29 RX ADMIN — Medication 2 MILLIGRAM(S): at 17:25

## 2017-08-29 RX ADMIN — OXYCODONE AND ACETAMINOPHEN 1 TABLET(S): 5; 325 TABLET ORAL at 04:50

## 2017-08-29 RX ADMIN — PRAMIPEXOLE DIHYDROCHLORIDE 1.5 MILLIGRAM(S): 0.12 TABLET ORAL at 14:53

## 2017-08-29 RX ADMIN — Medication 1 APPLICATION(S): at 05:40

## 2017-08-29 RX ADMIN — PRAMIPEXOLE DIHYDROCHLORIDE 1.5 MILLIGRAM(S): 0.12 TABLET ORAL at 05:40

## 2017-08-29 RX ADMIN — PIPERACILLIN AND TAZOBACTAM 25 GRAM(S): 4; .5 INJECTION, POWDER, LYOPHILIZED, FOR SOLUTION INTRAVENOUS at 21:27

## 2017-08-29 RX ADMIN — Medication 2 MILLIGRAM(S): at 05:40

## 2017-08-29 RX ADMIN — PRAMIPEXOLE DIHYDROCHLORIDE 1.5 MILLIGRAM(S): 0.12 TABLET ORAL at 13:02

## 2017-08-29 RX ADMIN — PRAMIPEXOLE DIHYDROCHLORIDE 1 MILLIGRAM(S): 0.12 TABLET ORAL at 21:26

## 2017-08-29 RX ADMIN — Medication 250 MILLIGRAM(S): at 07:00

## 2017-08-29 RX ADMIN — Medication 250 MILLIGRAM(S): at 15:53

## 2017-08-29 RX ADMIN — CARBIDOPA AND LEVODOPA 1 TABLET(S): 25; 100 TABLET ORAL at 05:40

## 2017-08-29 RX ADMIN — CARBIDOPA AND LEVODOPA 1 TABLET(S): 25; 100 TABLET ORAL at 13:02

## 2017-08-29 RX ADMIN — Medication 1 APPLICATION(S): at 17:25

## 2017-08-29 NOTE — CONSULT NOTE ADULT - ASSESSMENT
consult dict events noted finger infection swollen on iv antibiotics  hx of parkind disease  on sinemet mirapex and cr sinemet  artane discuss with pt and wife in detail for dbs  operation  will follow
cellulitis rt third fingr from self inflicted trauma   antibiotics   bacteremia sourav contaminant   may need MRI   antibiotics   see orders   will follow with you thanks

## 2017-08-29 NOTE — PHYSICAL THERAPY INITIAL EVALUATION ADULT - GENERAL OBSERVATIONS, REHAB EVAL
Pt seen supine in bed, alert and Ox4, NAD, pt father(Mr. Ely) at bedside. Pt /89, , O2 100% on room air.

## 2017-08-29 NOTE — PHYSICAL THERAPY INITIAL EVALUATION ADULT - MODIFIED CLINICAL TEST OF SENSORY INTEGRATION IN BALANCE TEST
Barthel Index: Feeding Score _10__, Bathing Score _5__, Grooming Score _5__, Dressing Score ___10, Bowels Score ___10, Bladder Score __10_, Toilet Score __10_, Transfers Score _15__, Mobility Score _10__, Stairs Score ___10,     Total Score __95_

## 2017-08-29 NOTE — PROGRESS NOTE ADULT - ASSESSMENT
Patient is a 38 y/o M PMH Parkinson's Disease p/w weakness, stiffness, vomiting, diarrhea.Was seen in ED earlier, blood cultures came back pos. for Gram pos. rods and cocci in clusters. On Vancomycin and Zosyn, ID and Hand surgery f/u. Continue the IV antibiotics. I D done by dr. Carmichael. Culture report pending. ID consult noted. On vancom[facility] and [facility] to see the patient.

## 2017-08-30 LAB
-  AMPICILLIN/SULBACTAM: SIGNIFICANT CHANGE UP
-  CEFAZOLIN: SIGNIFICANT CHANGE UP
-  CIPROFLOXACIN: SIGNIFICANT CHANGE UP
-  CLINDAMYCIN: SIGNIFICANT CHANGE UP
-  ERYTHROMYCIN: SIGNIFICANT CHANGE UP
-  GENTAMICIN: SIGNIFICANT CHANGE UP
-  LEVOFLOXACIN: SIGNIFICANT CHANGE UP
-  MOXIFLOXACIN(AEROBIC): SIGNIFICANT CHANGE UP
-  OXACILLIN: SIGNIFICANT CHANGE UP
-  PENICILLIN: SIGNIFICANT CHANGE UP
-  RIFAMPIN: SIGNIFICANT CHANGE UP
-  TETRACYCLINE: SIGNIFICANT CHANGE UP
-  TRIMETHOPRIM/SULFAMETHOXAZOLE: SIGNIFICANT CHANGE UP
-  VANCOMYCIN: SIGNIFICANT CHANGE UP
METHOD TYPE: SIGNIFICANT CHANGE UP

## 2017-08-30 RX ORDER — CARBIDOPA AND LEVODOPA 25; 100 MG/1; MG/1
1 TABLET ORAL
Qty: 0 | Refills: 0 | Status: DISCONTINUED | OUTPATIENT
Start: 2017-08-30 | End: 2017-09-01

## 2017-08-30 RX ADMIN — Medication 2 MILLIGRAM(S): at 18:49

## 2017-08-30 RX ADMIN — PIPERACILLIN AND TAZOBACTAM 25 GRAM(S): 4; .5 INJECTION, POWDER, LYOPHILIZED, FOR SOLUTION INTRAVENOUS at 05:50

## 2017-08-30 RX ADMIN — Medication 1 APPLICATION(S): at 05:54

## 2017-08-30 RX ADMIN — PRAMIPEXOLE DIHYDROCHLORIDE 1.5 MILLIGRAM(S): 0.12 TABLET ORAL at 13:29

## 2017-08-30 RX ADMIN — PRAMIPEXOLE DIHYDROCHLORIDE 1.5 MILLIGRAM(S): 0.12 TABLET ORAL at 05:53

## 2017-08-30 RX ADMIN — PIPERACILLIN AND TAZOBACTAM 25 GRAM(S): 4; .5 INJECTION, POWDER, LYOPHILIZED, FOR SOLUTION INTRAVENOUS at 21:38

## 2017-08-30 RX ADMIN — PRAMIPEXOLE DIHYDROCHLORIDE 1 MILLIGRAM(S): 0.12 TABLET ORAL at 21:36

## 2017-08-30 RX ADMIN — Medication 2 MILLIGRAM(S): at 05:54

## 2017-08-30 RX ADMIN — CARBIDOPA AND LEVODOPA 1 TABLET(S): 25; 100 TABLET ORAL at 21:35

## 2017-08-30 RX ADMIN — CARBIDOPA AND LEVODOPA 1 TABLET(S): 25; 100 TABLET ORAL at 13:29

## 2017-08-30 RX ADMIN — Medication 250 MILLIGRAM(S): at 05:51

## 2017-08-30 RX ADMIN — PIPERACILLIN AND TAZOBACTAM 25 GRAM(S): 4; .5 INJECTION, POWDER, LYOPHILIZED, FOR SOLUTION INTRAVENOUS at 15:46

## 2017-08-30 RX ADMIN — Medication 1 APPLICATION(S): at 18:49

## 2017-08-30 RX ADMIN — CARBIDOPA AND LEVODOPA 1 TABLET(S): 25; 100 TABLET ORAL at 05:53

## 2017-08-30 RX ADMIN — Medication 250 MILLIGRAM(S): at 21:38

## 2017-08-30 RX ADMIN — PRAMIPEXOLE DIHYDROCHLORIDE 1.5 MILLIGRAM(S): 0.12 TABLET ORAL at 18:53

## 2017-08-30 RX ADMIN — Medication 250 MILLIGRAM(S): at 13:35

## 2017-08-30 NOTE — PROGRESS NOTE ADULT - ASSESSMENT
awamarlen alert speech fluent mask face bradykinesia r hand infection on vi antibiotics  on sinemet miraprx  discuss with family will follow

## 2017-08-31 PROCEDURE — 73218 MRI UPPER EXTREMITY W/O DYE: CPT | Mod: 26,RT

## 2017-08-31 RX ADMIN — Medication 250 MILLIGRAM(S): at 05:29

## 2017-08-31 RX ADMIN — PIPERACILLIN AND TAZOBACTAM 25 GRAM(S): 4; .5 INJECTION, POWDER, LYOPHILIZED, FOR SOLUTION INTRAVENOUS at 21:44

## 2017-08-31 RX ADMIN — PRAMIPEXOLE DIHYDROCHLORIDE 1.5 MILLIGRAM(S): 0.12 TABLET ORAL at 05:28

## 2017-08-31 RX ADMIN — Medication 250 MILLIGRAM(S): at 14:01

## 2017-08-31 RX ADMIN — Medication 250 MILLIGRAM(S): at 21:46

## 2017-08-31 RX ADMIN — CARBIDOPA AND LEVODOPA 1 TABLET(S): 25; 100 TABLET ORAL at 21:44

## 2017-08-31 RX ADMIN — CARBIDOPA AND LEVODOPA 1 TABLET(S): 25; 100 TABLET ORAL at 11:18

## 2017-08-31 RX ADMIN — Medication 1 APPLICATION(S): at 18:04

## 2017-08-31 RX ADMIN — PIPERACILLIN AND TAZOBACTAM 25 GRAM(S): 4; .5 INJECTION, POWDER, LYOPHILIZED, FOR SOLUTION INTRAVENOUS at 14:01

## 2017-08-31 RX ADMIN — CARBIDOPA AND LEVODOPA 1 TABLET(S): 25; 100 TABLET ORAL at 00:37

## 2017-08-31 RX ADMIN — PRAMIPEXOLE DIHYDROCHLORIDE 1.5 MILLIGRAM(S): 0.12 TABLET ORAL at 14:01

## 2017-08-31 RX ADMIN — Medication 1 APPLICATION(S): at 05:51

## 2017-08-31 RX ADMIN — PIPERACILLIN AND TAZOBACTAM 25 GRAM(S): 4; .5 INJECTION, POWDER, LYOPHILIZED, FOR SOLUTION INTRAVENOUS at 05:29

## 2017-08-31 RX ADMIN — CARBIDOPA AND LEVODOPA 1 TABLET(S): 25; 100 TABLET ORAL at 18:04

## 2017-08-31 RX ADMIN — CARBIDOPA AND LEVODOPA 1 TABLET(S): 25; 100 TABLET ORAL at 05:28

## 2017-08-31 RX ADMIN — PRAMIPEXOLE DIHYDROCHLORIDE 1 MILLIGRAM(S): 0.12 TABLET ORAL at 21:44

## 2017-08-31 RX ADMIN — PRAMIPEXOLE DIHYDROCHLORIDE 1.5 MILLIGRAM(S): 0.12 TABLET ORAL at 21:45

## 2017-08-31 NOTE — PROGRESS NOTE ADULT - ASSESSMENT
awake alert speech fluent bradykinesia mask face  r hand infection for picc line for lonr term iv antibiotics    onnsinemet mirapex  for dbs in [facility]  for slick disease .  discuss with family

## 2017-08-31 NOTE — PROGRESS NOTE ADULT - ASSESSMENT
Patient is a 38 y/o M PMH Parkinson's Disease p/w weakness, stiffness, vomiting, diarrhea.Was seen in ED earlier, blood cultures came back pos. for Gram pos. rods and cocci in clusters. On Vancomycin and Zosyn, ID and Hand surgery f/u. Continue the IV antibiotics. I D done by dr. Carmichael. Culture report pending. ID consult noted. On vancomycin and Zosyn. Parkinson meds adjusted. Tremors improved . Neuro F/U noted.  Discharge planning as per ID. Will need PICC line and 4 weeks of IV Nafcillin.

## 2017-09-01 ENCOUNTER — TRANSCRIPTION ENCOUNTER (OUTPATIENT)
Age: 37
End: 2017-09-01

## 2017-09-01 VITALS
HEART RATE: 90 BPM | RESPIRATION RATE: 19 BRPM | SYSTOLIC BLOOD PRESSURE: 149 MMHG | TEMPERATURE: 98 F | DIASTOLIC BLOOD PRESSURE: 79 MMHG | OXYGEN SATURATION: 99 %

## 2017-09-01 LAB
ANION GAP SERPL CALC-SCNC: 11 MMOL/L — SIGNIFICANT CHANGE UP (ref 5–17)
BUN SERPL-MCNC: 13 MG/DL — SIGNIFICANT CHANGE UP (ref 7–23)
CALCIUM SERPL-MCNC: 9.1 MG/DL — SIGNIFICANT CHANGE UP (ref 8.5–10.1)
CHLORIDE SERPL-SCNC: 104 MMOL/L — SIGNIFICANT CHANGE UP (ref 96–108)
CO2 SERPL-SCNC: 24 MMOL/L — SIGNIFICANT CHANGE UP (ref 22–31)
CREAT SERPL-MCNC: 0.79 MG/DL — SIGNIFICANT CHANGE UP (ref 0.5–1.3)
GLUCOSE SERPL-MCNC: 114 MG/DL — HIGH (ref 70–99)
POTASSIUM SERPL-MCNC: 3.9 MMOL/L — SIGNIFICANT CHANGE UP (ref 3.5–5.3)
POTASSIUM SERPL-SCNC: 3.9 MMOL/L — SIGNIFICANT CHANGE UP (ref 3.5–5.3)
SODIUM SERPL-SCNC: 139 MMOL/L — SIGNIFICANT CHANGE UP (ref 135–145)

## 2017-09-01 PROCEDURE — 76937 US GUIDE VASCULAR ACCESS: CPT | Mod: 26

## 2017-09-01 PROCEDURE — 36569 INSJ PICC 5 YR+ W/O IMAGING: CPT

## 2017-09-01 PROCEDURE — 71010: CPT | Mod: 26

## 2017-09-01 PROCEDURE — 77001 FLUOROGUIDE FOR VEIN DEVICE: CPT | Mod: 26

## 2017-09-01 RX ORDER — NAFCILLIN 10 G/100ML
2 INJECTION, POWDER, FOR SOLUTION INTRAVENOUS EVERY 4 HOURS
Qty: 0 | Refills: 0 | Status: DISCONTINUED | OUTPATIENT
Start: 2017-09-01 | End: 2017-09-01

## 2017-09-01 RX ORDER — CARBIDOPA AND LEVODOPA 25; 100 MG/1; MG/1
1.5 TABLET ORAL
Qty: 0 | Refills: 0 | COMMUNITY
Start: 2017-09-01

## 2017-09-01 RX ORDER — BACITRACIN ZINC 500 UNIT/G
1 OINTMENT IN PACKET (EA) TOPICAL
Qty: 0 | Refills: 0 | COMMUNITY
Start: 2017-09-01

## 2017-09-01 RX ORDER — CARBIDOPA AND LEVODOPA 25; 100 MG/1; MG/1
2 TABLET ORAL
Qty: 0 | Refills: 0 | COMMUNITY
Start: 2017-09-01

## 2017-09-01 RX ORDER — NAFCILLIN 10 G/100ML
INJECTION, POWDER, FOR SOLUTION INTRAVENOUS
Qty: 0 | Refills: 0 | Status: DISCONTINUED | OUTPATIENT
Start: 2017-09-01 | End: 2017-09-01

## 2017-09-01 RX ORDER — NAFCILLIN 10 G/100ML
4 INJECTION, POWDER, FOR SOLUTION INTRAVENOUS
Qty: 1 | Refills: 0 | OUTPATIENT
Start: 2017-09-01

## 2017-09-01 RX ORDER — CARBIDOPA AND LEVODOPA 25; 100 MG/1; MG/1
0 TABLET ORAL
Qty: 0 | Refills: 0 | COMMUNITY

## 2017-09-01 RX ORDER — NAFCILLIN 10 G/100ML
2 INJECTION, POWDER, FOR SOLUTION INTRAVENOUS ONCE
Qty: 0 | Refills: 0 | Status: COMPLETED | OUTPATIENT
Start: 2017-09-01 | End: 2017-09-01

## 2017-09-01 RX ORDER — CARBIDOPA AND LEVODOPA 25; 100 MG/1; MG/1
1 TABLET ORAL
Qty: 0 | Refills: 0 | COMMUNITY
Start: 2017-09-01

## 2017-09-01 RX ORDER — CARBIDOPA AND LEVODOPA 25; 100 MG/1; MG/1
1 TABLET ORAL
Qty: 0 | Refills: 0 | DISCHARGE
Start: 2017-09-01

## 2017-09-01 RX ADMIN — CARBIDOPA AND LEVODOPA 1 TABLET(S): 25; 100 TABLET ORAL at 06:08

## 2017-09-01 RX ADMIN — PIPERACILLIN AND TAZOBACTAM 25 GRAM(S): 4; .5 INJECTION, POWDER, LYOPHILIZED, FOR SOLUTION INTRAVENOUS at 06:08

## 2017-09-01 RX ADMIN — PRAMIPEXOLE DIHYDROCHLORIDE 1.5 MILLIGRAM(S): 0.12 TABLET ORAL at 06:08

## 2017-09-01 RX ADMIN — NAFCILLIN 200 GRAM(S): 10 INJECTION, POWDER, FOR SOLUTION INTRAVENOUS at 14:15

## 2017-09-01 RX ADMIN — CARBIDOPA AND LEVODOPA 1 TABLET(S): 25; 100 TABLET ORAL at 00:12

## 2017-09-01 RX ADMIN — PRAMIPEXOLE DIHYDROCHLORIDE 1.5 MILLIGRAM(S): 0.12 TABLET ORAL at 12:43

## 2017-09-01 RX ADMIN — CARBIDOPA AND LEVODOPA 1 TABLET(S): 25; 100 TABLET ORAL at 11:51

## 2017-09-01 RX ADMIN — Medication 250 MILLIGRAM(S): at 06:09

## 2017-09-01 RX ADMIN — Medication 1 APPLICATION(S): at 06:09

## 2017-09-01 NOTE — PROGRESS NOTE ADULT - PROBLEM SELECTOR PLAN 3
Improving, assist with ADLs, PT as tolerated.

## 2017-09-01 NOTE — PROGRESS NOTE ADULT - PROBLEM SELECTOR PLAN 4
Due to medication changes. s/p IVF in the ER, zofran IV PRN

## 2017-09-01 NOTE — PROGRESS NOTE ADULT - ASSESSMENT
abscess third finger right hand  patient is left handed?? mri needed   methicillin sensitive staph aureus   mri with osteo   discharge plan with home iv nafcillin x 4 weeks and then po   we recommend close follow up in our office lou as sometimes therapy has to be modified lou as not all culture data may be finalised before discharge  we recommend close outpatient follow up in our office at [phone] /or an infectious disaese physician of your choice lou as many orthopedic infections may require consolidation with oral abntibiotics for 3-6 months and in extreme cases lifelong suppression may be required abscess third finger right hand   methicillin sensitive staph aureus   mri with osteo   discharge plan with home iv nafcillin x 3 weeks and then po   we recommend close follow up in our office lou as sometimes therapy has to be modified lou as not all culture data may be finalised before discharge  we recommend close outpatient follow up in our office at [phone] /or an infectious disaese physician of your choice lou as many orthopedic infections may require consolidation with oral abntibiotics for 3-6 months and in extreme cases lifelong suppression may be required

## 2017-09-01 NOTE — PROGRESS NOTE ADULT - PROVIDER SPECIALTY LIST ADULT
Internal Medicine
Neurology
Neurology
Infectious Disease

## 2017-09-01 NOTE — DISCHARGE NOTE ADULT - PATIENT PORTAL LINK FT
“You can access the [facility] Patient Portal, offered by [facility], by registering with the following website: [email or web address]”

## 2017-09-01 NOTE — DISCHARGE NOTE ADULT - MEDICATION SUMMARY - MEDICATIONS TO TAKE
I will START or STAY ON the medications listed below when I get home from the hospital:    acetaminophen 500 mg oral tablet  -- 2 tab(s) by mouth every 6 hours  -- This product contains acetaminophen.  Do not use  with any other product containing acetaminophen to prevent possible liver damage.    -- Indication: For fever    trihexyphenidyl 2 mg oral tablet  -- 1 tab(s) by mouth 2 times a day  -- Indication: For parkinsons    pramipexole 1.5 mg oral tablet  -- 1 tab(s) by mouth 3 times a day  -- Indication: For parkinsons    carbidopa-levodopa 25 mg-100 mg oral tablet  -- 1 tab(s) by mouth 4 times a day  -- Indication: For Parkinsons    pramipexole 1 mg oral tablet  -- 1 tab(s) by mouth once a day  -- Indication: For Parkinsons    bacitracin 500 units/g topical ointment  -- 1 application on skin 2 times a day  -- Indication: For Cellulitis

## 2017-09-01 NOTE — PROGRESS NOTE ADULT - PROBLEM SELECTOR PROBLEM 1
Parkinson disease, symptomatic

## 2017-09-01 NOTE — DISCHARGE NOTE ADULT - CARE PLAN
Principal Discharge DX:	Osteomyelitis of hand, right, acute  Goal:	IV Nafcilline  Instructions for follow-up, activity and diet:	Regular  Secondary Diagnosis:	Parkinson disease  Goal:	Continue meds  Secondary Diagnosis:	Cellulitis and abscess of finger of right hand  Goal:	IV Antibiotics

## 2017-09-01 NOTE — PROGRESS NOTE ADULT - ASSESSMENT
Patient is a 38 y/o M PMH Parkinson's Disease p/w weakness, stiffness, vomiting, diarrhea.Was seen in ED earlier, blood cultures came back pos. for Gram pos. rods and cocci in clusters. On Vancomycin and Zosyn, ID and Hand surgery f/u. Continue the IV antibiotics. I D done by dr. Carmichael. Culture report pending. ID consult noted. On vancomycin and Zosyn. Parkinson meds adjusted. Tremors improved . Neuro F/U noted.  Discharge planning as per ID. Will need PICC line and 4 weeks of IV Nafcillin. PICC line inserted.

## 2017-09-01 NOTE — PROGRESS NOTE ADULT - PROBLEM SELECTOR PLAN 2
Improved s/p sinemet in the ER. F/u CK in the AM, monitor for NMS.

## 2017-09-01 NOTE — DISCHARGE NOTE ADULT - OTHER SIGNIFICANT FINDINGS
Patient is a 36 y/o M [facility] Parkinson's Disease p/w weakness, stiffness, vomiting, diarrhea.Was seen in ED earlier, blood cultures came back pos. for Gram pos. rods and cocci in clusters. On Vancomycin and Zosyn, ID and Hand surgery f/u. Continue the IV antibiotics. I D done by dr. Carmichael. Culture report pending. ID consult noted. On vancomycin and Zosyn. Parkinson meds adjusted. Tremors improved . Neuro F/U noted.  Discharge planning as per ID. Will need PICC line and 4 weeks of IV Nafcillin. PICC line inserted. Discharged home. Antibiotic called in by Dr. Marina.

## 2017-09-01 NOTE — DISCHARGE NOTE ADULT - CARE PROVIDER_API CALL
Maria T Marina), Infectious Disease; Internal Medicine  [address] [place] [address]  Phone: [phone]  Fax: [phone]    Belem Peter), Neurology  [address] [place] [id number]  Phone: [phone]  Fax: [phone]

## 2017-09-01 NOTE — DISCHARGE NOTE ADULT - NSFTFSERV1RD_GEN_ALL_CORE
observation and assessment/central venous access care/medication teaching and assessment/wound care and assessment

## 2017-09-01 NOTE — PROGRESS NOTE ADULT - PROBLEM SELECTOR PLAN 1
Will resume patient's home meds for now, f/u w/ house neurology.

## 2017-09-01 NOTE — PROGRESS NOTE ADULT - SUBJECTIVE AND OBJECTIVE BOX
36 y/o male hx of Parkinson's, right third finger infection on multiple different abx over past few days (bactrim switched to augmentin last night) recalled for gram positive rods in blood culture. pt has been sleepy for most of today, extreme pain to finger, chills at home. no other symptoms. (27 Aug 2017 19:16)  has an abscess in third finger rt hand   mri with osteo discussed with primary care physician yesterday     No Known Allergies    Intolerances        MEDICATIONS  (STANDING):  pramipexole 1.5 milliGRAM(s) Oral three times a day  sodium chloride 0.9%. 1000 milliLiter(s) (100 mL/Hr) IV Continuous <Continuous>  piperacillin/tazobactam IVPB. 3.375 Gram(s) IV Intermittent every 8 hours  vancomycin  IVPB 1000 milliGRAM(s) IV Intermittent every 8 hours  BACItracin   Ointment 1 Application(s) Topical two times a day  carbidopa/levodopa CR 25/100 1 Tablet(s) Oral at bedtime  pramipexole 1 milliGRAM(s) Oral at bedtime  carbidopa/levodopa  25/100 1 Tablet(s) Oral four times a day    MEDICATIONS  (PRN):  acetaminophen   Tablet 650 milliGRAM(s) Oral every 6 hours PRN For Temp greater than 38.5 C (101.3 F)  oxyCODONE    5 mG/acetaminophen 325 mG 1 Tablet(s) Oral every 6 hours PRN Moderate Pain (4 - 6)      REVIEW OF SYSTEMS:  feels much better  CONSTITUTIONAL: No fever, chills, weight loss, or fatigue  HEENT: No sore throat, runny nose, ear ache  RESPIRATORY: No cough, wheezing, No shortness of breath  CARDIOVASCULAR: No chest pain, palpitations, dizziness  GASTROINTESTINAL: No abdominal pain. No nausea, vomiting, diarrhea  GENITOURINARY: No dysuria, increase frequency, hematuria, or incontinence  NEUROLOGICAL: No headaches, memory loss, loss of strength, numbness, or tremors, no weakness  EXTREMITY: No pedal edema BLE  SKIN: No itching, burning, rashes, or lesions     VITAL SIGNS:  T(C): 36.7 (09-01-17 @ 11:46), Max: 36.7 (09-01-17 @ 11:46)  T(F): 98 (09-01-17 @ 11:46), Max: 98 (09-01-17 @ 11:46)  HR: 90 (09-01-17 @ 11:46) (68 - 100)  BP: 149/79 (09-01-17 @ 11:46) (102/63 - 149/79)  RR: 19 (09-01-17 @ 11:46) (18 - 19)  SpO2: 99% (09-01-17 @ 11:46) (96% - 99%)  Wt(kg): --    PHYSICAL EXAM:    GENERAL: not in any distress  HEENT: Neck is supple, normocephalic, atraumatic   CHEST/LUNG: Clear  bilaterally; No rales, rhonchi, wheezing  HEART: Regular rate and rhythm; No murmurs, rubs, or gallops  ABDOMEN: Soft, Nontender, Nondistended; Bowel sounds present, no rebound   EXTREMITIES:  2+ Peripheral Pulses, No clubbing, cyanosis, or edema  finger is better   SKIN: No rashes or lesions  BACK: no pressor sore   NERVOUS SYSTEM:  Alert & Oriented X3,  marked tremor       LABS:     09-01    139  |  104  |  13  ----------------------------<  114<H>  3.9   |  24  |  0.79    Ca    9.1      01 Sep 2017 07:40                          Vancomycin Level, Trough: 14.7 ug/mL (08-29 @ 12:47)                      Radiology:
38 y/o male hx of Parkinson's, right third finger infection on multiple different abx over past few days (bactrim switched to augmentin last night) recalled for gram positive rods in blood culture. pt has been sleepy for most of today, extreme pain to finger, chills at home. no other symptoms. (27 Aug 2017 19:16)  has an abscess in third finger rt hand   Allergies    No Known Allergies    Intolerances        MEDICATIONS  (STANDING):  carbidopa/levodopa CR 25/100 1 Tablet(s) Oral three times a day  pramipexole 1.5 milliGRAM(s) Oral three times a day  trihexyphenidyl 2 milliGRAM(s) Oral two times a day  sodium chloride 0.9%. 1000 milliLiter(s) (100 mL/Hr) IV Continuous <Continuous>  piperacillin/tazobactam IVPB. 3.375 Gram(s) IV Intermittent every 8 hours  vancomycin  IVPB 1000 milliGRAM(s) IV Intermittent every 8 hours  BACItracin   Ointment 1 Application(s) Topical two times a day  carbidopa/levodopa CR 25/100 1 Tablet(s) Oral at bedtime  pramipexole 1 milliGRAM(s) Oral at bedtime    MEDICATIONS  (PRN):  acetaminophen   Tablet 650 milliGRAM(s) Oral every 6 hours PRN For Temp greater than 38.5 C (101.3 F)  oxyCODONE    5 mG/acetaminophen 325 mG 1 Tablet(s) Oral every 6 hours PRN Moderate Pain (4 - 6)      REVIEW OF SYSTEMS:    CONSTITUTIONAL: No fever, chills,  finger is better   tremor mental status is the same     VITAL SIGNS:  T(C): 36.3 (08-30-17 @ 17:06), Max: 36.6 (08-30-17 @ 05:14)  T(F): 97.4 (08-30-17 @ 17:06), Max: 97.8 (08-30-17 @ 05:14)  HR: 80 (08-30-17 @ 17:06) (71 - 85)  BP: 118/69 (08-30-17 @ 17:06) (118/69 - 134/66)  RR: 18 (08-30-17 @ 17:06) (17 - 18)  SpO2: 98% (08-30-17 @ 17:06) (97% - 100%)  Wt(kg): --    PHYSICAL EXAM:    GENERAL: not in any distress  HEENT: Neck is supple, normocephalic, atraumatic   CHEST/LUNG: Clear to percussion bilaterally; No rales, rhonchi, wheezing  HEART: Regular rate and rhythm; No murmurs, rubs, or gallops  ABDOMEN: Soft, Nontender, Nondistended; Bowel sounds present, no rebound   EXTREMITIES:  2+ Peripheral Pulses, No clubbing, cyanosis, or edema  SKIN: improved  BACK: no pressor sore   NERVOUS SYSTEM:  Alert & Oriented X3,; marked tremor   LABS:                               Vancomycin Level, Trough: 14.7 ug/mL (08-29 @ 12:47)          culture staph aureus            Radiology:
HPI:  · HPI  36 y/o male hx of Parkinson's, right third finger infection on multiple different abx over past few days (bactrim switched to augmentin last night) recalled for gram positive rods in blood culture. pt has been sleepy for most of today, extreme pain to finger, chills at home. no other symptoms. (27 Aug 2017 19:16)  has an abscess in third finger rt hand     Allergies    No Known Allergies    Intolerances        MEDICATIONS  (STANDING):  carbidopa/levodopa CR 25/100 1 Tablet(s) Oral three times a day  pramipexole 1.5 milliGRAM(s) Oral three times a day  trihexyphenidyl 2 milliGRAM(s) Oral two times a day  sodium chloride 0.9%. 1000 milliLiter(s) (100 mL/Hr) IV Continuous <Continuous>  piperacillin/tazobactam IVPB. 3.375 Gram(s) IV Intermittent every 8 hours  vancomycin  IVPB 1000 milliGRAM(s) IV Intermittent every 8 hours  BACItracin   Ointment 1 Application(s) Topical two times a day  carbidopa/levodopa CR 25/100 1 Tablet(s) Oral at bedtime  pramipexole 1 milliGRAM(s) Oral at bedtime    MEDICATIONS  (PRN):  acetaminophen   Tablet 650 milliGRAM(s) Oral every 6 hours PRN For Temp greater than 38.5 C (101.3 F)  oxyCODONE    5 mG/acetaminophen 325 mG 1 Tablet(s) Oral every 6 hours PRN Moderate Pain (4 - 6)      REVIEW OF SYSTEMS:    CONSTITUTIONAL: No fever, chills, weight loss, or fatigue  HEENT: No sore throat, runny nose, ear ache  RESPIRATORY: No cough, wheezing, No shortness of breath  CARDIOVASCULAR: No chest pain, palpitations, dizziness  GASTROINTESTINAL: No abdominal pain. No nausea, vomiting, diarrhea  GENITOURINARY: No dysuria, increase frequency, hematuria, or incontinence  NEUROLOGICAL:  tremors, and  weakness  EXTREMITY: No pedal edema BLE  rt hand ad finger cellulitis   SKIN: No itching, burning, rashes, or lesions     VITAL SIGNS:  T(C): 36.6 (08-29-17 @ 17:21), Max: 36.6 (08-28-17 @ 23:00)  T(F): 97.8 (08-29-17 @ 17:21), Max: 97.8 (08-28-17 @ 23:00)  HR: 92 (08-29-17 @ 17:21) (82 - 104)  BP: 117/78 (08-29-17 @ 17:21) (110/69 - 148/90)  RR: 18 (08-29-17 @ 17:21) (17 - 18)  SpO2: 97% (08-29-17 @ 17:21) (97% - 100%)  Wt(kg): --    PHYSICAL EXAM:    GENERAL: not in any distress  HEENT: Neck is supple, normocephalic, atraumatic   CHEST/LUNG: Clear to percussion bilaterally; No rales, rhonchi, wheezing  HEART: Regular rate and rhythm; No murmurs, rubs, or gallops  ABDOMEN: Soft, Nontender, Nondistended; Bowel sounds present, no rebound   EXTREMITIES:  2+ Peripheral Pulses, No clubbing, cyanosis, or edema  SKIN: No rashes or lesions; abscess finger  BACK: no pressor sore   NERVOUS SYSTEM:  Alert & Oriented X3, Good concentration  PSYCH: normal affect     LABS:                         14.4   7.9   )-----------( 296      ( 28 Aug 2017 07:45 )             46.8     08-28    138  |  106  |  9   ----------------------------<  120<H>  4.2   |  24  |  0.77    Ca    8.2<L>      28 Aug 2017 07:45                HPI:  · HPI  36 y/o male hx of Parkinson's, right third finger infection on multiple different abx over past few days (bactrim switched to augmentin last night) recalled for gram positive rods in blood culture. pt has been sleepy for most of today, extreme pain to finger, chills at home. no other symptoms. (27 Aug 2017 19:16)      Allergies    No Known Allergies    Intolerances        MEDICATIONS  (STANDING):  carbidopa/levodopa CR 25/100 1 Tablet(s) Oral three times a day  pramipexole 1.5 milliGRAM(s) Oral three times a day  trihexyphenidyl 2 milliGRAM(s) Oral two times a day  sodium chloride 0.9%. 1000 milliLiter(s) (100 mL/Hr) IV Continuous <Continuous>  piperacillin/tazobactam IVPB. 3.375 Gram(s) IV Intermittent every 8 hours  vancomycin  IVPB 1000 milliGRAM(s) IV Intermittent every 8 hours  BACItracin   Ointment 1 Application(s) Topical two times a day  carbidopa/levodopa CR 25/100 1 Tablet(s) Oral at bedtime  pramipexole 1 milliGRAM(s) Oral at bedtime    MEDICATIONS  (PRN):  acetaminophen   Tablet 650 milliGRAM(s) Oral every 6 hours PRN For Temp greater than 38.5 C (101.3 F)  oxyCODONE    5 mG/acetaminophen 325 mG 1 Tablet(s) Oral every 6 hours PRN Moderate Pain (4 - 6)      REVIEW OF SYSTEMS:    CONSTITUTIONAL: No fever, chills, weight loss, or fatigue  HEENT: No sore throat, runny nose, ear ache  RESPIRATORY: No cough, wheezing, No shortness of breath  CARDIOVASCULAR: No chest pain, palpitations, dizziness  GASTROINTESTINAL: No abdominal pain. No nausea, vomiting, diarrhea  GENITOURINARY: No dysuria, increase frequency, hematuria, or incontinence  NEUROLOGICAL: No headaches, memory loss, loss of strength, numbness, or tremors, no weakness  EXTREMITY: No pedal edema BLE  SKIN: No itching, burning, rashes, or lesions     VITAL SIGNS:  T(C): 36.6 (08-29-17 @ 17:21), Max: 36.6 (08-28-17 @ 23:00)  T(F): 97.8 (08-29-17 @ 17:21), Max: 97.8 (08-28-17 @ 23:00)  HR: 92 (08-29-17 @ 17:21) (82 - 104)  BP: 117/78 (08-29-17 @ 17:21) (110/69 - 148/90)  RR: 18 (08-29-17 @ 17:21) (17 - 18)  SpO2: 97% (08-29-17 @ 17:21) (97% - 100%)  Wt(kg): --    PHYSICAL EXAM:    GENERAL: not in any distress  HEENT: Neck is supple, normocephalic, atraumatic   CHEST/LUNG: Clear to percussion bilaterally; No rales, rhonchi, wheezing  HEART: Regular rate and rhythm; No murmurs, rubs, or gallops  ABDOMEN: Soft, Nontender, Nondistended; Bowel sounds present, no rebound   EXTREMITIES:  2+ Peripheral Pulses, No clubbing, cyanosis, or edema  GENITOURINARY:   SKIN: No rashes or lesions  BACK: no pressor sore   NERVOUS SYSTEM:  Alert & Oriented X3, Good concentration  PSYCH: normal affect     LABS:                         14.4   7.9   )-----------( 296      ( 28 Aug 2017 07:45 )             46.8     08-28    138  |  106  |  9   ----------------------------<  120<H>  4.2   |  24  |  0.77    Ca    8.2<L>      28 Aug 2017 07:45                          Vancomycin Level, Trough: 14.7 ug/mL (08-29 @ 12:47)              culture staph aureus      Radiology:                Vancomycin Level, Trough: 14.7 ug/mL (08-29 @ 12:47)                      Radiology:
Patient is a 37y old  Male who presents with a chief complaint of Positive blood cultures (27 Aug 2017 19:16)      INTERVAL HPI/OVERNIGHT EVENTS:    MEDICATIONS  (STANDING):  pramipexole 1.5 milliGRAM(s) Oral three times a day  sodium chloride 0.9%. 1000 milliLiter(s) (100 mL/Hr) IV Continuous <Continuous>  piperacillin/tazobactam IVPB. 3.375 Gram(s) IV Intermittent every 8 hours  vancomycin  IVPB 1000 milliGRAM(s) IV Intermittent every 8 hours  BACItracin   Ointment 1 Application(s) Topical two times a day  carbidopa/levodopa CR 25/100 1 Tablet(s) Oral at bedtime  pramipexole 1 milliGRAM(s) Oral at bedtime  carbidopa/levodopa  25/100 1 Tablet(s) Oral four times a day    MEDICATIONS  (PRN):  acetaminophen   Tablet 650 milliGRAM(s) Oral every 6 hours PRN For Temp greater than 38.5 C (101.3 F)  oxyCODONE    5 mG/acetaminophen 325 mG 1 Tablet(s) Oral every 6 hours PRN Moderate Pain (4 - 6)      Allergies    No Known Allergies    Intolerances        REVIEW OF SYSTEMS:  CONSTITUTIONAL: No fever, weight loss, or fatigue  EYES: No eye pain, visual disturbances, or discharge  ENMT:  No difficulty hearing, tinnitus, vertigo; No sinus or throat pain  NECK: No pain or stiffness  BREASTS: No pain, masses, or nipple discharge  RESPIRATORY: No cough, wheezing, chills or hemoptysis; No shortness of breath  CARDIOVASCULAR: No chest pain, palpitations, dizziness, or leg swelling  GASTROINTESTINAL: No abdominal or epigastric pain. No nausea, vomiting, or hematemesis; No diarrhea or constipation. No melena or hematochezia.  GENITOURINARY: No dysuria, frequency, hematuria, or incontinence  NEUROLOGICAL: No headaches, memory loss, loss of strength, numbness, or tremors  SKIN: No itching, burning, rashes, or lesions   LYMPH NODES: No enlarged glands  ENDOCRINE: No heat or cold intolerance; No hair loss  MUSCULOSKELETAL: No joint pain or swelling; No muscle, back, or extremity pain  PSYCHIATRIC: No depression, anxiety, mood swings, or difficulty sleeping  HEME/LYMPH: No easy bruising, or bleeding gums  ALLERGY AND IMMUNOLOGIC: No hives or eczema    Vital Signs Last 24 Hrs  T(C): 37.6 (31 Aug 2017 05:13), Max: 37.6 (31 Aug 2017 05:13)  T(F): 99.6 (31 Aug 2017 05:13), Max: 99.6 (31 Aug 2017 05:13)  HR: 95 (31 Aug 2017 05:13) (80 - 106)  BP: 131/86 (31 Aug 2017 05:13) (118/69 - 146/86)  BP(mean): --  RR: 18 (31 Aug 2017 05:13) (18 - 18)  SpO2: 97% (31 Aug 2017 05:13) (97% - 100%)    PHYSICAL EXAM:  GENERAL: NAD, well-groomed, well-developed  HEAD:  Atraumatic, Normocephalic  EYES: EOMI, PERRLA, conjunctiva and sclera clear  ENMT:  Moist mucous membranes, Good dentition, No lesions  NECK: Supple, No JVD, Normal thyroid  NERVOUS SYSTEM:  Alert & Oriented X3, Good concentration; Motor Strength 5/5 B/L upper and lower extremities; DTRs 2+ intact and symmetric  CHEST/LUNG: Clear to percussion bilaterally; No rales, rhonchi, wheezing, or rubs  HEART: Regular rate and rhythm; No murmurs, rubs, or gallops  ABDOMEN: Soft, Nontender, Nondistended; Bowel sounds present  EXTREMITIES:  2+ Peripheral Pulses, No clubbing, cyanosis, or edema  LYMPH: No lymphadenopathy noted  SKIN: No rashes or lesions    LABS:              CAPILLARY BLOOD GLUCOSE              Procalcitonin, Serum: <0.05 ng/mL (08-27 @ 22:43)        Urine Culture:      Blood Culture:      RADIOLOGY & ADDITIONAL TESTS:  < from: MRI Upp Ext Non-joint w/o Cont, Right (08.31.17 @ 09:39) >    EXAM:  MRI UP EXT NO JT W O CONT RT                            PROCEDURE DATE:  08/31/2017          INTERPRETATION:  MRI UP EXT NO JT W O CONT RT dated 8/31/2017 9:39 AM     INDICATION: Nonhealing abscess at the right third finger. Question of   osteomyelitis. Finger wound. Pain.    TECHNIQUE: Multi-sequential, multiplanar MRI imaging of the upper   extremity was performed per standard protocol.     COMPARISON: Hand radiographs dated 8/26/2017    FINDINGS: Evaluation for abscess is limited without IV contrast. Given   this limitation, there is a focal skin defect along the ulnar aspect of   the long finger. Deep to the skin ulcer there is soft tissue swelling and   edema which extends deep to contact the distal phalanx. The edema is most   confluent along the ulnar aspect of the third DIP and middle phalanx   measuring approximately 0.5 x 0.6 x 2.1 cm. The visualized portions of   the tendons are intact. There is no tenosynovitis. No full-thickness tear   or retraction is identified. The intrinsic musculature the hand is   preserved.    Evaluation of the bone marrow signal demonstrates abnormal T2   hyperintensity within the proximal aspect of the third distal phalanx   with patchy areas of decreased T1 signal. No joint effusion is seen at   the third DIP. There is edema along the mid and distal portion of the   third middle phalanx. No fractures identified. There is nonspecific   cystic change within the third metacarpal head. No particular erosion is   seen.    There is no soft tissue mass. Neurovascular structures appear intact.      IMPRESSION:   1.  Skin defect at the ulnar third digit with soft tissue swelling in   keeping with synovitis.  2.  Phlegmonous change along the ulnar aspect of the third digit. Limited   evaluation for abscess without IV contrast. No definite drainable fluid   collection.  3.  Abnormal marrow signal within the third middle and distal phalanges   in keeping with osteomyelitis.                SHAZIA ZEPEDA M.D., ATTENDING RADIOLOGIST  This document has been electronically signed. Aug 31 2017 10:32AM                < end of copied text >    Imaging Personally Reviewed:  [ ] YES  [ ] NO    Consultant(s) Notes Reviewed:  [ ] YES  [ ] NO    Care Discussed with Consultants/Other Providers [ ] YES  [ ] NO    PROBLEMS:  BACTEREMIA  MEDICAL EVALUATION  BLOOD INFECTION; ADMISSION  Bacteremia      Care discussed with family,         [x  ]   yes  [  ]  No    imp:    stable[x ]    unstable[  ]     improving [   ]       unchanged  [  ]                Plans:  Continue present plans  [  ]               New consult [  ]   specialty  .......               order stiven[  ]    test name.                  Discharge Planning  [  ]
Patient is a 37y old  Male who presents with a chief complaint of Positive blood cultures (27 Aug 2017 19:16)      INTERVAL HPI/OVERNIGHT EVENTS: More alert    MEDICATIONS  (STANDING):  carbidopa/levodopa CR 25/100 1 Tablet(s) Oral three times a day  pramipexole 1.5 milliGRAM(s) Oral three times a day  trihexyphenidyl 2 milliGRAM(s) Oral two times a day  sodium chloride 0.9%. 1000 milliLiter(s) (100 mL/Hr) IV Continuous <Continuous>  piperacillin/tazobactam IVPB. 3.375 Gram(s) IV Intermittent every 8 hours  vancomycin  IVPB 1000 milliGRAM(s) IV Intermittent every 8 hours    MEDICATIONS  (PRN):  acetaminophen   Tablet 650 milliGRAM(s) Oral every 6 hours PRN For Temp greater than 38.5 C (101.3 F)      Allergies    No Known Allergies    Intolerances        REVIEW OF SYSTEMS:  CONSTITUTIONAL: No fever, weight loss, or fatigue  EYES: No eye pain, visual disturbances, or discharge  ENMT:  No difficulty hearing, tinnitus, vertigo; No sinus or throat pain  NECK: No pain or stiffness  BREASTS: No pain, masses, or nipple discharge  RESPIRATORY: No cough, wheezing, chills or hemoptysis; No shortness of breath  CARDIOVASCULAR: No chest pain, palpitations, dizziness, or leg swelling  GASTROINTESTINAL: No abdominal or epigastric pain. No nausea, vomiting, or hematemesis; No diarrhea or constipation. No melena or hematochezia.  GENITOURINARY: No dysuria, frequency, hematuria, or incontinence  NEUROLOGICAL: No headaches, memory loss, loss of strength, numbness, or tremors  SKIN: No itching, burning, rashes, or lesions   LYMPH NODES: No enlarged glands  ENDOCRINE: No heat or cold intolerance; No hair loss  MUSCULOSKELETAL: No joint pain or swelling; No muscle, back, or extremity pain  PSYCHIATRIC: No depression, anxiety, mood swings, or difficulty sleeping  HEME/LYMPH: No easy bruising, or bleeding gums  ALLERGY AND IMMUNOLOGIC: No hives or eczema    Vital Signs Last 24 Hrs  T(C): 36.4 (28 Aug 2017 13:13), Max: 36.8 (27 Aug 2017 16:49)  T(F): 97.6 (28 Aug 2017 13:13), Max: 98.2 (27 Aug 2017 16:49)  HR: 75 (28 Aug 2017 13:13) (65 - 92)  BP: 130/60 (28 Aug 2017 13:13) (114/69 - 137/78)  BP(mean): --  RR: 17 (28 Aug 2017 13:13) (16 - 18)  SpO2: 96% (28 Aug 2017 13:13) (96% - 100%)    PHYSICAL EXAM:  GENERAL: NAD, well-groomed, well-developed  HEAD:  Atraumatic, Normocephalic  EYES: EOMI, PERRLA, conjunctiva and sclera clear  ENMT:  Moist mucous membranes, Good dentition, No lesions  NECK: Supple, No JVD, Normal thyroid  NERVOUS SYSTEM:  Alert & Oriented X3, Good concentration; Motor Strength 5/5 B/L upper and lower extremities; DTRs 2+ intact and symmetric  CHEST/LUNG: Clear to percussion bilaterally; No rales, rhonchi, wheezing, or rubs  HEART: Regular rate and rhythm; No murmurs, rubs, or gallops  ABDOMEN: Soft, Nontender, Nondistended; Bowel sounds present  EXTREMITIES:  2+ Peripheral Pulses, Swelling and erythema left 3rd finger.   LYMPH: No lymphadenopathy noted  SKIN: No rashes or lesions    LABS:                        14.4   7.9   )-----------( 296      ( 28 Aug 2017 07:45 )             46.8     -    138  |  106  |  9   ----------------------------<  120<H>  4.2   |  24  |  0.77    Ca    8.2<L>      28 Aug 2017 07:45    TPro  7.4  /  Alb  3.4  /  TBili  0.9  /  DBili  x   /  AST  18  /  ALT  10<L>  /  AlkPhos  94  08-27    PT/INR - ( 26 Aug 2017 21:03 )   PT: 11.8 sec;   INR: 1.08 ratio         PTT - ( 26 Aug 2017 21:03 )  PTT:44.7 sec  Urinalysis Basic - ( 26 Aug 2017 22:06 )    Color: Yellow / Appearance: Clear / S.015 / pH: x  Gluc: x / Ketone: Negative  / Bili: Negative / Urobili: Negative mg/dL   Blood: x / Protein: Negative mg/dL / Nitrite: Negative   Leuk Esterase: Negative / RBC: 0-2 /HPF / WBC 3-5   Sq Epi: x / Non Sq Epi: x / Bacteria: Few      CAPILLARY BLOOD GLUCOSE              Procalcitonin, Serum: <0.05 ng/mL ( @ 22:43)    Patient is a 37y old  Male who presents with a chief complaint of Positive blood cultures (27 Aug 2017 19:16)      INTERVAL HPI/OVERNIGHT EVENTS:    MEDICATIONS  (STANDING):  carbidopa/levodopa CR 25/100 1 Tablet(s) Oral three times a day  pramipexole 1.5 milliGRAM(s) Oral three times a day  trihexyphenidyl 2 milliGRAM(s) Oral two times a day  sodium chloride 0.9%. 1000 milliLiter(s) (100 mL/Hr) IV Continuous <Continuous>  piperacillin/tazobactam IVPB. 3.375 Gram(s) IV Intermittent every 8 hours  vancomycin  IVPB 1000 milliGRAM(s) IV Intermittent every 8 hours    MEDICATIONS  (PRN):  acetaminophen   Tablet 650 milliGRAM(s) Oral every 6 hours PRN For Temp greater than 38.5 C (101.3 F)      Allergies    No Known Allergies    Intolerances        REVIEW OF SYSTEMS:  CONSTITUTIONAL: No fever, weight loss, or fatigue  EYES: No eye pain, visual disturbances, or discharge  ENMT:  No difficulty hearing, tinnitus, vertigo; No sinus or throat pain  NECK: No pain or stiffness  BREASTS: No pain, masses, or nipple discharge  RESPIRATORY: No cough, wheezing, chills or hemoptysis; No shortness of breath  CARDIOVASCULAR: No chest pain, palpitations, dizziness, or leg swelling  GASTROINTESTINAL: No abdominal or epigastric pain. No nausea, vomiting, or hematemesis; No diarrhea or constipation. No melena or hematochezia.  GENITOURINARY: No dysuria, frequency, hematuria, or incontinence  NEUROLOGICAL: No headaches, memory loss, loss of strength, numbness, or tremors  SKIN: No itching, burning, rashes, or lesions   LYMPH NODES: No enlarged glands  ENDOCRINE: No heat or cold intolerance; No hair loss  MUSCULOSKELETAL: No joint pain or swelling; No muscle, back, or extremity pain  PSYCHIATRIC: No depression, anxiety, mood swings, or difficulty sleeping  HEME/LYMPH: No easy bruising, or bleeding gums  ALLERGY AND IMMUNOLOGIC: No hives or eczema    Vital Signs Last 24 Hrs  T(C): 36.4 (28 Aug 2017 13:13), Max: 36.8 (27 Aug 2017 16:49)  T(F): 97.6 (28 Aug 2017 13:13), Max: 98.2 (27 Aug 2017 16:49)  HR: 75 (28 Aug 2017 13:13) (65 - 92)  BP: 130/60 (28 Aug 2017 13:13) (114/69 - 137/78)  BP(mean): --  RR: 17 (28 Aug 2017 13:13) (16 - 18)  SpO2: 96% (28 Aug 2017 13:13) (96% - 100%)    PHYSICAL EXAM:  GENERAL: NAD, well-groomed, well-developed  HEAD:  Atraumatic, Normocephalic  EYES: EOMI, PERRLA, conjunctiva and sclera clear  ENMT:  Moist mucous membranes, Good dentition, No lesions  NECK: Supple, No JVD, Normal thyroid  NERVOUS SYSTEM:  Alert & Oriented X3, Good concentration; Motor Strength 5/5 B/L upper and lower extremities; DTRs 2+ intact and symmetric  CHEST/LUNG: Clear to percussion bilaterally; No rales, rhonchi, wheezing, or rubs  HEART: Regular rate and rhythm; No murmurs, rubs, or gallops  ABDOMEN: Soft, Nontender, Nondistended; Bowel sounds present  EXTREMITIES:  2+ Peripheral Pulses, No clubbing, cyanosis, or edema  LYMPH: No lymphadenopathy noted  SKIN: No rashes or lesions    LABS:                        14.4   7.9   )-----------( 296      ( 28 Aug 2017 07:45 )             46.8         138  |  106  |  9   ----------------------------<  120<H>  4.2   |  24  |  0.77    Ca    8.2<L>      28 Aug 2017 07:45    TPro  7.4  /  Alb  3.4  /  TBili  0.9  /  DBili  x   /  AST  18  /  ALT  10<L>  /  AlkPhos  94  08-27    PT/INR - ( 26 Aug 2017 21:03 )   PT: 11.8 sec;   INR: 1.08 ratio         PTT - ( 26 Aug 2017 21:03 )  PTT:44.7 sec  Urinalysis Basic - ( 26 Aug 2017 22:06 )    Color: Yellow / Appearance: Clear / S.015 / pH: x  Gluc: x / Ketone: Negative  / Bili: Negative / Urobili: Negative mg/dL   Blood: x / Protein: Negative mg/dL / Nitrite: Negative   Leuk Esterase: Negative / RBC: 0-2 /HPF / WBC 3-5   Sq Epi: x / Non Sq Epi: x / Bacteria: Few      CAPILLARY BLOOD GLUCOSE              Procalcitonin, Serum: <0.05 ng/mL ( @ 22:43)        Urine Culture:      Blood Culture:  Culture - Blood (17 @ 23:38)    -  Candida parapsilosis: Nondet    -  Escherichia coli: Nondet    -  Haemophilus influenzae: Nondet    -  Pseudomonas aeruginosa: Nondet    -  Serratia marcescens: Nondet    -  Vancomycin resistant Enterococcus sp.: Nondet    Gram Stain:   Growth in aerobic bottle: Gram Positive Cocci in Clusters    -  Coagulase negative Staphylococcus: Detec    -  Proteus species: Nondet    -  Streptococcus agalactiae (Group B): Nondet    -  Streptococcus pyogenes (Group A): Nondet    -  Acinetobacter baumanii: Nondet    -  Klebsiella pneumoniae: Nondet    -  Methicillin resistant Staphylococcus aureus (MRSA): Nondet    -  Streptococcus pneumoniae: Nondet    -  Candida albicans: Nondet    -  Candida glabrata: Nondet    -  Candida krusei: Nondet    -  Candida tropicalis: Nondet    -  Enterobacter cloacae complex: Nondet    -  Enterococcus species: Nondet    -  Klebsiella oxytoca: Nondet    -  Listeria monocytogenes: Nondet    -  Multidrug (KPC pos) resistant organism: Nondet    -  Neisseria meningitidis: Nondet    -  Staphylococcus aureus: Nondet    -  Streptococcus sp. (Not Grp A, B or S pneumoniae): Nondet    Specimen Source: .Blood Blood-Peripheral    Organism: Blood Culture PCR    Culture Results:   Growth in aerobic bottle: Gram Positive Cocci in Clusters  ***Blood Panel PCR results on this specimen are available  approximately 3 hours after the Gram stain result.***  Gram stain, PCR, and/or culture results may not always  correspond due to difference in methodologies.    Organism Identification: Blood Culture PCR    Method Type: PCR        RADIOLOGY & ADDITIONAL TESTS:    Imaging Personally Reviewed:  [ ] YES  [ ] NO    Consultant(s) Notes Reviewed:  [ ] YES  [ ] NO    Care Discussed with Consultants/Other Providers [ ] YES  [ ] NO    PROBLEMS:  BACTEREMIA  MEDICAL EVALUATION  BLOOD INFECTION; ADMISSION  Bacteremia      Care discussed with family,         [  ]   yes  [  ]  No    imp:    stable[ ]    unstable[  ]     improving [   ]       unchanged  [  ]                Plans:  Continue present plans  [  ]               New consult [  ]   specialty  .......               order stiven[  ]    test name.                  Discharge Planning  [  ]               Urine Culture:      Blood Culture:      RADIOLOGY & ADDITIONAL TESTS:    Imaging Personally Reviewed:  [ ] YES  [ ] NO    Consultant(s) Notes Reviewed:  [ ] YES  [ ] NO    Care Discussed with Consultants/Other Providers [ ] YES  [ ] NO    PROBLEMS:  BACTEREMIA  MEDICAL EVALUATION  BLOOD INFECTION; ADMISSION  Bacteremia      Care discussed with family,         [  ]   yes  [  ]  No    imp:    stable[ ]    unstable[  ]     improving [   ]       unchanged  [  ]                Plans:  Continue present plans  [  ]               New consult [  ]   specialty  .......               order stiven[  ]    test name.                  Discharge Planning  [  ]
Patient is a 37y old  Male who presents with a chief complaint of Positive blood cultures (27 Aug 2017 19:16)      INTERVAL HPI/OVERNIGHT EVENTS:Asymptomatic    MEDICATIONS  (STANDING):  pramipexole 1.5 milliGRAM(s) Oral three times a day  sodium chloride 0.9%. 1000 milliLiter(s) (100 mL/Hr) IV Continuous <Continuous>  piperacillin/tazobactam IVPB. 3.375 Gram(s) IV Intermittent every 8 hours  vancomycin  IVPB 1000 milliGRAM(s) IV Intermittent every 8 hours  BACItracin   Ointment 1 Application(s) Topical two times a day  carbidopa/levodopa CR 25/100 1 Tablet(s) Oral at bedtime  pramipexole 1 milliGRAM(s) Oral at bedtime  carbidopa/levodopa  25/100 1 Tablet(s) Oral four times a day    MEDICATIONS  (PRN):  acetaminophen   Tablet 650 milliGRAM(s) Oral every 6 hours PRN For Temp greater than 38.5 C (101.3 F)  oxyCODONE    5 mG/acetaminophen 325 mG 1 Tablet(s) Oral every 6 hours PRN Moderate Pain (4 - 6)      Allergies    No Known Allergies    Intolerances        REVIEW OF SYSTEMS:  CONSTITUTIONAL: No fever, weight loss, or fatigue  EYES: No eye pain, visual disturbances, or discharge  ENMT:  No difficulty hearing, tinnitus, vertigo; No sinus or throat pain  NECK: No pain or stiffness  BREASTS: No pain, masses, or nipple discharge  RESPIRATORY: No cough, wheezing, chills or hemoptysis; No shortness of breath  CARDIOVASCULAR: No chest pain, palpitations, dizziness, or leg swelling  GASTROINTESTINAL: No abdominal or epigastric pain. No nausea, vomiting, or hematemesis; No diarrhea or constipation. No melena or hematochezia.  GENITOURINARY: No dysuria, frequency, hematuria, or incontinence  NEUROLOGICAL: No headaches, memory loss, loss of strength, numbness, or tremors  SKIN: No itching, burning, rashes, or lesions   LYMPH NODES: No enlarged glands  ENDOCRINE: No heat or cold intolerance; No hair loss  MUSCULOSKELETAL: No joint pain or swelling; No muscle, back, or extremity pain  PSYCHIATRIC: No depression, anxiety, mood swings, or difficulty sleeping  HEME/LYMPH: No easy bruising, or bleeding gums  ALLERGY AND IMMUNOLOGIC: No hives or eczema    Vital Signs Last 24 Hrs  T(C): 36.7 (01 Sep 2017 11:46), Max: 36.7 (01 Sep 2017 11:46)  T(F): 98 (01 Sep 2017 11:46), Max: 98 (01 Sep 2017 11:46)  HR: 90 (01 Sep 2017 11:46) (68 - 100)  BP: 149/79 (01 Sep 2017 11:46) (102/63 - 149/79)  BP(mean): --  RR: 19 (01 Sep 2017 11:46) (18 - 19)  SpO2: 99% (01 Sep 2017 11:46) (96% - 99%)    PHYSICAL EXAM:  GENERAL: NAD, well-groomed, well-developed  HEAD:  Atraumatic, Normocephalic  EYES: EOMI, URBAN, conjunctiva and sclera clear  ENMT:  Moist mucous membranes, Good dentition, No lesions  NECK: Supple, No JVD, Normal thyroid  NERVOUS SYSTEM:  Alert & Oriented X3, Good concentration; Motor Strength 5/5 B/L upper and lower extremities; DTRs 2+ intact and symmetric  CHEST/LUNG: Clear to percussion bilaterally; No rales, rhonchi, wheezing, or rubs  HEART: Regular rate and rhythm; No murmurs, rubs, or gallops  ABDOMEN: Soft, Nontender, Nondistended; Bowel sounds present  EXTREMITIES:  2+ Peripheral Pulses, No clubbing, cyanosis, or edema  LYMPH: No lymphadenopathy noted  SKIN: No rashes or lesions    LABS:    09-01    139  |  104  |  13  ----------------------------<  114<H>  3.9   |  24  |  0.79    Ca    9.1      01 Sep 2017 07:40          CAPILLARY BLOOD GLUCOSE              Procalcitonin, Serum: <0.05 ng/mL (08-27 @ 22:43)        Urine Culture:      Blood Culture:      RADIOLOGY & ADDITIONAL TESTS:  < from: MRI Upp Ext Non-joint w/o Cont, Right (08.31.17 @ 09:39) >    EXAM:  MRI UP EXT NO JT W O CONT RT                            PROCEDURE DATE:  08/31/2017          INTERPRETATION:  MRI UP EXT NO JT W O CONT RT dated 8/31/2017 9:39 AM     INDICATION: Nonhealing abscess at the right third finger. Question of   osteomyelitis. Finger wound. Pain.    TECHNIQUE: Multi-sequential, multiplanar MRI imaging of the upper   extremity was performed per standard protocol.     COMPARISON: Hand radiographs dated 8/26/2017    FINDINGS: Evaluation for abscess is limited without IV contrast. Given   this limitation, there is a focal skin defect along the ulnar aspect of   the long finger. Deep to the skin ulcer there is soft tissue swelling and   edema which extends deep to contact the distal phalanx. The edema is most   confluent along the ulnar aspect of the third DIP and middle phalanx   measuring approximately 0.5 x 0.6 x 2.1 cm. The visualized portions of   the tendons are intact. There is no tenosynovitis. No full-thickness tear   or retraction is identified. The intrinsic musculature the hand is   preserved.    Evaluation of the bone marrow signal demonstrates abnormal T2   hyperintensity within the proximal aspect of the third distal phalanx   with patchy areas of decreased T1 signal. No joint effusion is seen at   the third DIP. There is edema along the mid and distal portion of the   third middle phalanx. No fractures identified. There is nonspecific   cystic change within the third metacarpal head. No particular erosion is   seen.    There is no soft tissue mass. Neurovascular structures appear intact.      IMPRESSION:   1.  Skin defect at the ulnar third digit with soft tissue swelling in   keeping with synovitis.  2.  Phlegmonous change along the ulnar aspect of the third digit. Limited   evaluation for abscess without IV contrast. No definite drainable fluid   collection.  3.  Abnormal marrow signal within the third middle and distal phalanges   in keeping with osteomyelitis.    SHAZIA ZEPEDA M.D., ATTENDING RADIOLOGIST  This document has been electronically signed. Aug 31 2017 10:32AM       < end of copied text >    Imaging Personally Reviewed:  [ ] YES  [ ] NO    Consultant(s) Notes Reviewed:  [ ] YES  [ ] NO    Care Discussed with Consultants/Other Providers [ ] YES  [ ] NO    PROBLEMS:  BACTEREMIA  MEDICAL EVALUATION  BLOOD INFECTION; ADMISSION  Bacteremia      Care discussed with family,         [  ]   yes  [  ]  No    imp:    stable[ ]    unstable[  ]     improving [   ]       unchanged  [  ]                Plans:  Continue present plans  [  ]               New consult [  ]   specialty  .......               order stiven[  ]    test name.                  Discharge Planning  [  ]
Patient is a 37y old  Male who presents with a chief complaint of Positive blood cultures (27 Aug 2017 19:16)      INTERVAL HPI/OVERNIGHT EVENTS:Tremors improved    MEDICATIONS  (STANDING):  carbidopa/levodopa CR 25/100 1 Tablet(s) Oral three times a day  pramipexole 1.5 milliGRAM(s) Oral three times a day  trihexyphenidyl 2 milliGRAM(s) Oral two times a day  sodium chloride 0.9%. 1000 milliLiter(s) (100 mL/Hr) IV Continuous <Continuous>  piperacillin/tazobactam IVPB. 3.375 Gram(s) IV Intermittent every 8 hours  vancomycin  IVPB 1000 milliGRAM(s) IV Intermittent every 8 hours  BACItracin   Ointment 1 Application(s) Topical two times a day  carbidopa/levodopa CR 25/100 1 Tablet(s) Oral at bedtime  pramipexole 1 milliGRAM(s) Oral at bedtime    MEDICATIONS  (PRN):  acetaminophen   Tablet 650 milliGRAM(s) Oral every 6 hours PRN For Temp greater than 38.5 C (101.3 F)  oxyCODONE    5 mG/acetaminophen 325 mG 1 Tablet(s) Oral every 6 hours PRN Moderate Pain (4 - 6)      Allergies    No Known Allergies    Intolerances        REVIEW OF SYSTEMS:  CONSTITUTIONAL: No fever, weight loss, or fatigue  EYES: No eye pain, visual disturbances, or discharge  ENMT:  No difficulty hearing, tinnitus, vertigo; No sinus or throat pain  NECK: No pain or stiffness  BREASTS: No pain, masses, or nipple discharge  RESPIRATORY: No cough, wheezing, chills or hemoptysis; No shortness of breath  CARDIOVASCULAR: No chest pain, palpitations, dizziness, or leg swelling  GASTROINTESTINAL: No abdominal or epigastric pain. No nausea, vomiting, or hematemesis; No diarrhea or constipation. No melena or hematochezia.  GENITOURINARY: No dysuria, frequency, hematuria, or incontinence  NEUROLOGICAL: No headaches, memory loss, loss of strength, numbness, or tremors  SKIN: No itching, burning, rashes, or lesions   LYMPH NODES: No enlarged glands  ENDOCRINE: No heat or cold intolerance; No hair loss  MUSCULOSKELETAL: No joint pain or swelling; No muscle, back, or extremity pain  PSYCHIATRIC: No depression, anxiety, mood swings, or difficulty sleeping  HEME/LYMPH: No easy bruising, or bleeding gums  ALLERGY AND IMMUNOLOGIC: No hives or eczema    Vital Signs Last 24 Hrs  T(C): 36.6 (30 Aug 2017 12:36), Max: 36.6 (29 Aug 2017 17:21)  T(F): 97.8 (30 Aug 2017 12:36), Max: 97.8 (29 Aug 2017 17:21)  HR: 85 (30 Aug 2017 12:36) (71 - 92)  BP: 134/66 (30 Aug 2017 12:36) (117/78 - 134/66)  BP(mean): --  RR: 17 (30 Aug 2017 12:36) (17 - 18)  SpO2: 98% (30 Aug 2017 12:36) (97% - 100%)    PHYSICAL EXAM:  GENERAL: NAD, well-groomed, well-developed  HEAD:  Atraumatic, Normocephalic  EYES: EOMI, PERRLA, conjunctiva and sclera clear  ENMT:  Moist mucous membranes, Good dentition, No lesions  NECK: Supple, No JVD, Normal thyroid  NERVOUS SYSTEM:  Alert & Oriented X3, Good concentration; Motor Strength 5/5 B/L upper and lower extremities; DTRs 2+ intact and symmetric  CHEST/LUNG: Clear to percussion bilaterally; No rales, rhonchi, wheezing, or rubs  HEART: Regular rate and rhythm; No murmurs, rubs, or gallops  ABDOMEN: Soft, Nontender, Nondistended; Bowel sounds present  EXTREMITIES:  2+ Peripheral Pulses, No clubbing, cyanosis, or edema. Swelling 3rd finger improving.  LYMPH: No lymphadenopathy noted  SKIN: No rashes or lesions    LABS:              CAPILLARY BLOOD GLUCOSE              Procalcitonin, Serum: <0.05 ng/mL (08-27 @ 22:43)        Urine Culture:      Blood Culture:      RADIOLOGY & ADDITIONAL TESTS:    Imaging Personally Reviewed:  [ ] YES  [ ] NO    Consultant(s) Notes Reviewed:  [ ] YES  [ ] NO    Care Discussed with Consultants/Other Providers [ ] YES  [ ] NO    PROBLEMS:  BACTEREMIA  MEDICAL EVALUATION  BLOOD INFECTION; ADMISSION  Bacteremia      Care discussed with family,         [  ]   yes  [  ]  No    imp:    stable[ ]    unstable[  ]     improving [   ]       unchanged  [  ]                Plans:  Continue present plans  [  ]               New consult [  ]   specialty  .......               order stiven[  ]    test name.                  Discharge Planning  [  ]
Patient is a 37y old  Male who presents with a chief complaint of Positive blood cultures (27 Aug 2017 19:16)      INTERVAL HPI/OVERNIGHT EVENTS:S/Pm I & D of finger abscess by dr. Carmichael.    MEDICATIONS  (STANDING):  carbidopa/levodopa CR 25/100 1 Tablet(s) Oral three times a day  pramipexole 1.5 milliGRAM(s) Oral three times a day  trihexyphenidyl 2 milliGRAM(s) Oral two times a day  sodium chloride 0.9%. 1000 milliLiter(s) (100 mL/Hr) IV Continuous <Continuous>  piperacillin/tazobactam IVPB. 3.375 Gram(s) IV Intermittent every 8 hours  vancomycin  IVPB 1000 milliGRAM(s) IV Intermittent every 8 hours  BACItracin   Ointment 1 Application(s) Topical two times a day  carbidopa/levodopa CR 25/100 1 Tablet(s) Oral at bedtime  pramipexole 1 milliGRAM(s) Oral at bedtime    MEDICATIONS  (PRN):  acetaminophen   Tablet 650 milliGRAM(s) Oral every 6 hours PRN For Temp greater than 38.5 C (101.3 F)  oxyCODONE    5 mG/acetaminophen 325 mG 1 Tablet(s) Oral every 6 hours PRN Moderate Pain (4 - 6)      Allergies    No Known Allergies    Intolerances        REVIEW OF SYSTEMS:  CONSTITUTIONAL: No fever, weight loss, or fatigue  EYES: No eye pain, visual disturbances, or discharge  ENMT:  No difficulty hearing, tinnitus, vertigo; No sinus or throat pain  NECK: No pain or stiffness  BREASTS: No pain, masses, or nipple discharge  RESPIRATORY: No cough, wheezing, chills or hemoptysis; No shortness of breath  CARDIOVASCULAR: No chest pain, palpitations, dizziness, or leg swelling  GASTROINTESTINAL: No abdominal or epigastric pain. No nausea, vomiting, or hematemesis; No diarrhea or constipation. No melena or hematochezia.  GENITOURINARY: No dysuria, frequency, hematuria, or incontinence  NEUROLOGICAL: No headaches, memory loss, loss of strength, numbness ,Increasing tremors.  SKIN: No itching, burning, rashes, or lesions   LYMPH NODES: No enlarged glands  ENDOCRINE: No heat or cold intolerance; No hair loss  MUSCULOSKELETAL: No joint pain or swelling; No muscle, back, or extremity pain  PSYCHIATRIC: No depression, anxiety, mood swings, or difficulty sleeping  HEME/LYMPH: No easy bruising, or bleeding gums  ALLERGY AND IMMUNOLOGIC: No hives or eczema    Vital Signs Last 24 Hrs  T(C): 36.4 (29 Aug 2017 12:06), Max: 37.1 (28 Aug 2017 17:21)  T(F): 97.6 (29 Aug 2017 12:06), Max: 98.8 (28 Aug 2017 17:21)  HR: 82 (29 Aug 2017 12:06) (82 - 93)  BP: 122/79 (29 Aug 2017 12:06) (110/69 - 122/79)  BP(mean): --  RR: 17 (29 Aug 2017 12:06) (17 - 18)  SpO2: 100% (29 Aug 2017 12:06) (97% - 100%)    PHYSICAL EXAM:  GENERAL: NAD, well-groomed, well-developed  HEAD:  Atraumatic, Normocephalic  EYES: EOMI, PERRLA, conjunctiva and sclera clear  ENMT:  Moist mucous membranes, Good dentition, No lesions  NECK: Supple, No JVD, Normal thyroid  NERVOUS SYSTEM:  Alert & Oriented X3, Good concentration; Motor Strength 4/5 B/L upper and lower extremities; DTRs 2+ intact and symmetric. Tremors and dysarthria worse.  CHEST/LUNG: Clear to percussion bilaterally; No rales, rhonchi, wheezing, or rubs  HEART: Regular rate and rhythm; No murmurs, rubs, or gallops  ABDOMEN: Soft, Nontender, Nondistended; Bowel sounds present  EXTREMITIES:  2+ Peripheral Pulses, No clubbing, cyanosis, or edema  LYMPH: No lymphadenopathy noted  SKIN: No rashes or lesions    LABS:                        14.4   7.9   )-----------( 296      ( 28 Aug 2017 07:45 )             46.8     08-28    138  |  106  |  9   ----------------------------<  120<H>  4.2   |  24  |  0.77    Ca    8.2<L>      28 Aug 2017 07:45    TPro  7.4  /  Alb  3.4  /  TBili  0.9  /  DBili  x   /  AST  18  /  ALT  10<L>  /  AlkPhos  94  08-27        CAPILLARY BLOOD GLUCOSE              Procalcitonin, Serum: <0.05 ng/mL (08-27 @ 22:43)        Urine Culture:      Blood Culture:      RADIOLOGY & ADDITIONAL TESTS:    Imaging Personally Reviewed:  [ ] YES  [ ] NO    Consultant(s) Notes Reviewed:  [ ] YES  [ ] NO    Care Discussed with Consultants/Other Providers [ ] YES  [ ] NO    PROBLEMS:  BACTEREMIA  MEDICAL EVALUATION  BLOOD INFECTION; ADMISSION  Bacteremia      Care discussed with family,         [  ]   yes  [  ]  No    imp:    stable[ ]    unstable[  ]     improving [   ]       unchanged  [  ]                Plans:  Continue present plans  [  ]               New consult [  ]   specialty  .......               order stiven[  ]    test name.                  Discharge Planning  [  ]

## 2017-09-02 LAB
CULTURE RESULTS: SIGNIFICANT CHANGE UP
ORGANISM # SPEC MICROSCOPIC CNT: SIGNIFICANT CHANGE UP
ORGANISM # SPEC MICROSCOPIC CNT: SIGNIFICANT CHANGE UP
SPECIMEN SOURCE: SIGNIFICANT CHANGE UP

## 2017-09-06 ENCOUNTER — TRANSCRIPTION ENCOUNTER (OUTPATIENT)
Age: 37
End: 2017-09-06

## 2017-09-11 DIAGNOSIS — G20 PARKINSON'S DISEASE: ICD-10-CM

## 2017-09-11 DIAGNOSIS — M65.041: ICD-10-CM

## 2017-09-11 DIAGNOSIS — B96.89 OTHER SPECIFIED BACTERIAL AGENTS AS THE CAUSE OF DISEASES CLASSIFIED ELSEWHERE: ICD-10-CM

## 2017-09-11 DIAGNOSIS — M86.141 OTHER ACUTE OSTEOMYELITIS, RIGHT HAND: ICD-10-CM

## 2017-09-11 DIAGNOSIS — M65.841 OTHER SYNOVITIS AND TENOSYNOVITIS, RIGHT HAND: ICD-10-CM

## 2017-09-11 DIAGNOSIS — R25.1 TREMOR, UNSPECIFIED: ICD-10-CM

## 2017-09-11 DIAGNOSIS — R78.81 BACTEREMIA: ICD-10-CM

## 2017-09-11 DIAGNOSIS — L03.011 CELLULITIS OF RIGHT FINGER: ICD-10-CM

## 2017-09-11 DIAGNOSIS — S61.202A UNSPECIFIED OPEN WOUND OF RIGHT MIDDLE FINGER WITHOUT DAMAGE TO NAIL, INITIAL ENCOUNTER: ICD-10-CM

## 2017-09-19 ENCOUNTER — INPATIENT (INPATIENT)
Facility: HOSPITAL | Age: 37
LOS: 2 days | Discharge: HOME | End: 2017-09-22
Attending: HOSPITALIST | Admitting: HOSPITALIST
Payer: MEDICARE

## 2017-09-19 VITALS
SYSTOLIC BLOOD PRESSURE: 148 MMHG | RESPIRATION RATE: 17 BRPM | OXYGEN SATURATION: 99 % | HEART RATE: 107 BPM | DIASTOLIC BLOOD PRESSURE: 74 MMHG | TEMPERATURE: 98 F

## 2017-09-19 DIAGNOSIS — Z98.890 OTHER SPECIFIED POSTPROCEDURAL STATES: Chronic | ICD-10-CM

## 2017-09-19 NOTE — ED ADULT TRIAGE NOTE - CHIEF COMPLAINT QUOTE
pt sent to ED by visiting nurse for increased sweating. pt has picc line to right bicep, being treated at home with antibiotics to tx osteomyelitis to the right middle finger.

## 2017-09-20 DIAGNOSIS — G20 PARKINSON'S DISEASE: ICD-10-CM

## 2017-09-20 DIAGNOSIS — R61 GENERALIZED HYPERHIDROSIS: ICD-10-CM

## 2017-09-20 DIAGNOSIS — T88.7XXA UNSPECIFIED ADVERSE EFFECT OF DRUG OR MEDICAMENT, INITIAL ENCOUNTER: ICD-10-CM

## 2017-09-20 DIAGNOSIS — Z29.9 ENCOUNTER FOR PROPHYLACTIC MEASURES, UNSPECIFIED: ICD-10-CM

## 2017-09-20 DIAGNOSIS — A68.9 RELAPSING FEVER, UNSPECIFIED: ICD-10-CM

## 2017-09-20 LAB
ALBUMIN SERPL ELPH-MCNC: 4.3 G/DL — SIGNIFICANT CHANGE UP (ref 3.3–5)
ALP SERPL-CCNC: 65 U/L — SIGNIFICANT CHANGE UP (ref 40–120)
ALT FLD-CCNC: 7 U/L — SIGNIFICANT CHANGE UP (ref 4–41)
APPEARANCE UR: CLEAR — SIGNIFICANT CHANGE UP
AST SERPL-CCNC: 21 U/L — SIGNIFICANT CHANGE UP (ref 4–40)
B PERT DNA SPEC QL NAA+PROBE: SIGNIFICANT CHANGE UP
BACTERIA # UR AUTO: SIGNIFICANT CHANGE UP
BASOPHILS # BLD AUTO: 0.06 K/UL — SIGNIFICANT CHANGE UP (ref 0–0.2)
BASOPHILS NFR BLD AUTO: 1.3 % — SIGNIFICANT CHANGE UP (ref 0–2)
BILIRUB SERPL-MCNC: 0.6 MG/DL — SIGNIFICANT CHANGE UP (ref 0.2–1.2)
BILIRUB UR-MCNC: NEGATIVE — SIGNIFICANT CHANGE UP
BLOOD UR QL VISUAL: NEGATIVE — SIGNIFICANT CHANGE UP
BUN SERPL-MCNC: 17 MG/DL — SIGNIFICANT CHANGE UP (ref 7–23)
C PNEUM DNA SPEC QL NAA+PROBE: NOT DETECTED — SIGNIFICANT CHANGE UP
CALCIUM SERPL-MCNC: 9.1 MG/DL — SIGNIFICANT CHANGE UP (ref 8.4–10.5)
CHLORIDE SERPL-SCNC: 102 MMOL/L — SIGNIFICANT CHANGE UP (ref 98–107)
CO2 SERPL-SCNC: 25 MMOL/L — SIGNIFICANT CHANGE UP (ref 22–31)
COLOR SPEC: YELLOW — SIGNIFICANT CHANGE UP
CREAT SERPL-MCNC: 1.12 MG/DL — SIGNIFICANT CHANGE UP (ref 0.5–1.3)
EOSINOPHIL # BLD AUTO: 0.21 K/UL — SIGNIFICANT CHANGE UP (ref 0–0.5)
EOSINOPHIL NFR BLD AUTO: 4.6 % — SIGNIFICANT CHANGE UP (ref 0–6)
FLUAV H1 2009 PAND RNA SPEC QL NAA+PROBE: NOT DETECTED — SIGNIFICANT CHANGE UP
FLUAV H1 RNA SPEC QL NAA+PROBE: NOT DETECTED — SIGNIFICANT CHANGE UP
FLUAV H3 RNA SPEC QL NAA+PROBE: NOT DETECTED — SIGNIFICANT CHANGE UP
FLUAV SUBTYP SPEC NAA+PROBE: SIGNIFICANT CHANGE UP
FLUBV RNA SPEC QL NAA+PROBE: NOT DETECTED — SIGNIFICANT CHANGE UP
GLUCOSE SERPL-MCNC: 104 MG/DL — HIGH (ref 70–99)
GLUCOSE UR-MCNC: NEGATIVE — SIGNIFICANT CHANGE UP
HADV DNA SPEC QL NAA+PROBE: NOT DETECTED — SIGNIFICANT CHANGE UP
HCOV 229E RNA SPEC QL NAA+PROBE: NOT DETECTED — SIGNIFICANT CHANGE UP
HCOV HKU1 RNA SPEC QL NAA+PROBE: NOT DETECTED — SIGNIFICANT CHANGE UP
HCOV NL63 RNA SPEC QL NAA+PROBE: NOT DETECTED — SIGNIFICANT CHANGE UP
HCOV OC43 RNA SPEC QL NAA+PROBE: NOT DETECTED — SIGNIFICANT CHANGE UP
HCT VFR BLD CALC: 41.4 % — SIGNIFICANT CHANGE UP (ref 39–50)
HGB BLD-MCNC: 13.1 G/DL — SIGNIFICANT CHANGE UP (ref 13–17)
HMPV RNA SPEC QL NAA+PROBE: NOT DETECTED — SIGNIFICANT CHANGE UP
HPIV1 RNA SPEC QL NAA+PROBE: NOT DETECTED — SIGNIFICANT CHANGE UP
HPIV2 RNA SPEC QL NAA+PROBE: NOT DETECTED — SIGNIFICANT CHANGE UP
HPIV3 RNA SPEC QL NAA+PROBE: NOT DETECTED — SIGNIFICANT CHANGE UP
HPIV4 RNA SPEC QL NAA+PROBE: NOT DETECTED — SIGNIFICANT CHANGE UP
IMM GRANULOCYTES # BLD AUTO: 0.03 # — SIGNIFICANT CHANGE UP
IMM GRANULOCYTES NFR BLD AUTO: 0.7 % — SIGNIFICANT CHANGE UP (ref 0–1.5)
KETONES UR-MCNC: NEGATIVE — SIGNIFICANT CHANGE UP
LEUKOCYTE ESTERASE UR-ACNC: NEGATIVE — SIGNIFICANT CHANGE UP
LYMPHOCYTES # BLD AUTO: 1.87 K/UL — SIGNIFICANT CHANGE UP (ref 1–3.3)
LYMPHOCYTES # BLD AUTO: 40.6 % — SIGNIFICANT CHANGE UP (ref 13–44)
M PNEUMO DNA SPEC QL NAA+PROBE: NOT DETECTED — SIGNIFICANT CHANGE UP
MAGNESIUM SERPL-MCNC: 2.2 MG/DL — SIGNIFICANT CHANGE UP (ref 1.6–2.6)
MCHC RBC-ENTMCNC: 27.4 PG — SIGNIFICANT CHANGE UP (ref 27–34)
MCHC RBC-ENTMCNC: 31.6 % — LOW (ref 32–36)
MCV RBC AUTO: 86.6 FL — SIGNIFICANT CHANGE UP (ref 80–100)
MONOCYTES # BLD AUTO: 0.59 K/UL — SIGNIFICANT CHANGE UP (ref 0–0.9)
MONOCYTES NFR BLD AUTO: 12.8 % — SIGNIFICANT CHANGE UP (ref 2–14)
MUCOUS THREADS # UR AUTO: SIGNIFICANT CHANGE UP
NEUTROPHILS # BLD AUTO: 1.85 K/UL — SIGNIFICANT CHANGE UP (ref 1.8–7.4)
NEUTROPHILS NFR BLD AUTO: 40 % — LOW (ref 43–77)
NITRITE UR-MCNC: NEGATIVE — SIGNIFICANT CHANGE UP
NRBC # FLD: 0 — SIGNIFICANT CHANGE UP
PH UR: 7 — SIGNIFICANT CHANGE UP (ref 4.6–8)
PHOSPHATE SERPL-MCNC: 3.3 MG/DL — SIGNIFICANT CHANGE UP (ref 2.5–4.5)
PLATELET # BLD AUTO: 285 K/UL — SIGNIFICANT CHANGE UP (ref 150–400)
PMV BLD: 8.8 FL — SIGNIFICANT CHANGE UP (ref 7–13)
POTASSIUM SERPL-MCNC: 4.3 MMOL/L — SIGNIFICANT CHANGE UP (ref 3.5–5.3)
POTASSIUM SERPL-SCNC: 4.3 MMOL/L — SIGNIFICANT CHANGE UP (ref 3.5–5.3)
PROT SERPL-MCNC: 7.2 G/DL — SIGNIFICANT CHANGE UP (ref 6–8.3)
PROT UR-MCNC: NEGATIVE — SIGNIFICANT CHANGE UP
RBC # BLD: 4.78 M/UL — SIGNIFICANT CHANGE UP (ref 4.2–5.8)
RBC # FLD: 16 % — HIGH (ref 10.3–14.5)
RBC CASTS # UR COMP ASSIST: SIGNIFICANT CHANGE UP (ref 0–?)
RSV RNA SPEC QL NAA+PROBE: NOT DETECTED — SIGNIFICANT CHANGE UP
RV+EV RNA SPEC QL NAA+PROBE: NOT DETECTED — SIGNIFICANT CHANGE UP
SODIUM SERPL-SCNC: 140 MMOL/L — SIGNIFICANT CHANGE UP (ref 135–145)
SP GR SPEC: 1.01 — SIGNIFICANT CHANGE UP (ref 1–1.03)
SQUAMOUS # UR AUTO: SIGNIFICANT CHANGE UP
UROBILINOGEN FLD QL: NORMAL E.U. — SIGNIFICANT CHANGE UP (ref 0.1–0.2)
WBC # BLD: 4.61 K/UL — SIGNIFICANT CHANGE UP (ref 3.8–10.5)
WBC # FLD AUTO: 4.61 K/UL — SIGNIFICANT CHANGE UP (ref 3.8–10.5)
WBC UR QL: SIGNIFICANT CHANGE UP (ref 0–?)

## 2017-09-20 PROCEDURE — 99223 1ST HOSP IP/OBS HIGH 75: CPT | Mod: AI

## 2017-09-20 PROCEDURE — 71010: CPT | Mod: 26

## 2017-09-20 PROCEDURE — 99222 1ST HOSP IP/OBS MODERATE 55: CPT | Mod: GC

## 2017-09-20 RX ORDER — VANCOMYCIN HCL 1 G
1000 VIAL (EA) INTRAVENOUS EVERY 12 HOURS
Qty: 0 | Refills: 0 | Status: DISCONTINUED | OUTPATIENT
Start: 2017-09-20 | End: 2017-09-22

## 2017-09-20 RX ORDER — NAFCILLIN 10 G/100ML
INJECTION, POWDER, FOR SOLUTION INTRAVENOUS
Qty: 0 | Refills: 0 | Status: DISCONTINUED | OUTPATIENT
Start: 2017-09-20 | End: 2017-09-20

## 2017-09-20 RX ORDER — CARBIDOPA AND LEVODOPA 25; 100 MG/1; MG/1
1 TABLET ORAL
Qty: 0 | Refills: 0 | Status: DISCONTINUED | OUTPATIENT
Start: 2017-09-20 | End: 2017-09-22

## 2017-09-20 RX ORDER — CARBIDOPA AND LEVODOPA 25; 100 MG/1; MG/1
1 TABLET ORAL EVERY 4 HOURS
Qty: 0 | Refills: 0 | Status: DISCONTINUED | OUTPATIENT
Start: 2017-09-20 | End: 2017-09-20

## 2017-09-20 RX ORDER — ENTACAPONE 200 MG/1
200 TABLET, FILM COATED ORAL
Qty: 0 | Refills: 0 | Status: DISCONTINUED | OUTPATIENT
Start: 2017-09-20 | End: 2017-09-22

## 2017-09-20 RX ORDER — NAFCILLIN 10 G/100ML
2 INJECTION, POWDER, FOR SOLUTION INTRAVENOUS ONCE
Qty: 0 | Refills: 0 | Status: COMPLETED | OUTPATIENT
Start: 2017-09-20 | End: 2017-09-20

## 2017-09-20 RX ORDER — PRAMIPEXOLE DIHYDROCHLORIDE 0.12 MG/1
1 TABLET ORAL DAILY
Qty: 0 | Refills: 0 | Status: DISCONTINUED | OUTPATIENT
Start: 2017-09-20 | End: 2017-09-22

## 2017-09-20 RX ORDER — SODIUM CHLORIDE 9 MG/ML
1000 INJECTION INTRAMUSCULAR; INTRAVENOUS; SUBCUTANEOUS ONCE
Qty: 0 | Refills: 0 | Status: COMPLETED | OUTPATIENT
Start: 2017-09-20 | End: 2017-09-20

## 2017-09-20 RX ORDER — ESCITALOPRAM OXALATE 10 MG/1
5 TABLET, FILM COATED ORAL DAILY
Qty: 0 | Refills: 0 | Status: DISCONTINUED | OUTPATIENT
Start: 2017-09-20 | End: 2017-09-22

## 2017-09-20 RX ORDER — DIPHENHYDRAMINE HCL 50 MG
50 CAPSULE ORAL ONCE
Qty: 0 | Refills: 0 | Status: COMPLETED | OUTPATIENT
Start: 2017-09-20 | End: 2017-09-20

## 2017-09-20 RX ORDER — PRAMIPEXOLE DIHYDROCHLORIDE 0.12 MG/1
1.5 TABLET ORAL
Qty: 0 | Refills: 0 | Status: DISCONTINUED | OUTPATIENT
Start: 2017-09-20 | End: 2017-09-22

## 2017-09-20 RX ORDER — FAMOTIDINE 10 MG/ML
20 INJECTION INTRAVENOUS ONCE
Qty: 0 | Refills: 0 | Status: COMPLETED | OUTPATIENT
Start: 2017-09-20 | End: 2017-09-20

## 2017-09-20 RX ORDER — PRAMIPEXOLE DIHYDROCHLORIDE 0.12 MG/1
1.5 TABLET ORAL EVERY 8 HOURS
Qty: 0 | Refills: 0 | Status: DISCONTINUED | OUTPATIENT
Start: 2017-09-20 | End: 2017-09-20

## 2017-09-20 RX ORDER — TRIHEXYPHENIDYL HCL 2 MG
2 TABLET ORAL
Qty: 0 | Refills: 0 | Status: DISCONTINUED | OUTPATIENT
Start: 2017-09-20 | End: 2017-09-20

## 2017-09-20 RX ORDER — HEPARIN SODIUM 5000 [USP'U]/ML
5000 INJECTION INTRAVENOUS; SUBCUTANEOUS EVERY 8 HOURS
Qty: 0 | Refills: 0 | Status: DISCONTINUED | OUTPATIENT
Start: 2017-09-20 | End: 2017-09-22

## 2017-09-20 RX ORDER — NAFCILLIN 10 G/100ML
2 INJECTION, POWDER, FOR SOLUTION INTRAVENOUS EVERY 4 HOURS
Qty: 0 | Refills: 0 | Status: DISCONTINUED | OUTPATIENT
Start: 2017-09-20 | End: 2017-09-20

## 2017-09-20 RX ORDER — CARBIDOPA AND LEVODOPA 25; 100 MG/1; MG/1
1 TABLET ORAL DAILY
Qty: 0 | Refills: 0 | Status: DISCONTINUED | OUTPATIENT
Start: 2017-09-20 | End: 2017-09-22

## 2017-09-20 RX ORDER — PRAMIPEXOLE DIHYDROCHLORIDE 0.12 MG/1
1 TABLET ORAL
Qty: 0 | Refills: 0 | COMMUNITY

## 2017-09-20 RX ORDER — INFLUENZA VIRUS VACCINE 15; 15; 15; 15 UG/.5ML; UG/.5ML; UG/.5ML; UG/.5ML
0.5 SUSPENSION INTRAMUSCULAR ONCE
Qty: 0 | Refills: 0 | Status: DISCONTINUED | OUTPATIENT
Start: 2017-09-20 | End: 2017-09-22

## 2017-09-20 RX ADMIN — CARBIDOPA AND LEVODOPA 1 TABLET(S): 25; 100 TABLET ORAL at 11:20

## 2017-09-20 RX ADMIN — CARBIDOPA AND LEVODOPA 1 TABLET(S): 25; 100 TABLET ORAL at 15:35

## 2017-09-20 RX ADMIN — Medication 50 MILLIGRAM(S): at 03:25

## 2017-09-20 RX ADMIN — ENTACAPONE 200 MILLIGRAM(S): 200 TABLET, FILM COATED ORAL at 17:25

## 2017-09-20 RX ADMIN — ENTACAPONE 200 MILLIGRAM(S): 200 TABLET, FILM COATED ORAL at 20:37

## 2017-09-20 RX ADMIN — HEPARIN SODIUM 5000 UNIT(S): 5000 INJECTION INTRAVENOUS; SUBCUTANEOUS at 21:55

## 2017-09-20 RX ADMIN — CARBIDOPA AND LEVODOPA 1 TABLET(S): 25; 100 TABLET ORAL at 21:56

## 2017-09-20 RX ADMIN — CARBIDOPA AND LEVODOPA 1 TABLET(S): 25; 100 TABLET ORAL at 17:25

## 2017-09-20 RX ADMIN — PRAMIPEXOLE DIHYDROCHLORIDE 1 MILLIGRAM(S): 0.12 TABLET ORAL at 17:25

## 2017-09-20 RX ADMIN — NAFCILLIN 200 GRAM(S): 10 INJECTION, POWDER, FOR SOLUTION INTRAVENOUS at 12:44

## 2017-09-20 RX ADMIN — SODIUM CHLORIDE 1000 MILLILITER(S): 9 INJECTION INTRAMUSCULAR; INTRAVENOUS; SUBCUTANEOUS at 03:25

## 2017-09-20 RX ADMIN — Medication 250 MILLIGRAM(S): at 17:25

## 2017-09-20 RX ADMIN — CARBIDOPA AND LEVODOPA 1 TABLET(S): 25; 100 TABLET ORAL at 20:37

## 2017-09-20 RX ADMIN — CARBIDOPA AND LEVODOPA 1 TABLET(S): 25; 100 TABLET ORAL at 06:05

## 2017-09-20 RX ADMIN — FAMOTIDINE 20 MILLIGRAM(S): 10 INJECTION INTRAVENOUS at 03:25

## 2017-09-20 RX ADMIN — HEPARIN SODIUM 5000 UNIT(S): 5000 INJECTION INTRAVENOUS; SUBCUTANEOUS at 14:23

## 2017-09-20 RX ADMIN — PRAMIPEXOLE DIHYDROCHLORIDE 1.5 MILLIGRAM(S): 0.12 TABLET ORAL at 06:05

## 2017-09-20 RX ADMIN — ENTACAPONE 200 MILLIGRAM(S): 200 TABLET, FILM COATED ORAL at 15:57

## 2017-09-20 NOTE — ED PROVIDER NOTE - PROGRESS NOTE DETAILS
Patient with labs non-concerning for active infection, however given drug reaction to most recent antibiotic, needs to be admitted for establishment of new antibiotic regimen. d/w MAR.

## 2017-09-20 NOTE — H&P ADULT - PROBLEM SELECTOR PLAN 1
- Patient was reported to have recurrent fever at home, does not know the actual temperature  - Oral temp was 36.6 at ED  - Differential for his fever include: persistent bacteremia secondary to osteomyelitis vs TB   - Pending blood culture, urine culture - Patient was reported to have recurrent fever at home, does not know the actual temperature, Oral temp was 36.6 at ED  - Patient has no GI,  symptoms, low concern for UTI or abdominal infections; also no upper respiratory symptoms, RVP negative; no consolidation noted on CXR, low concern for pneumonia  - Pending blood culture, urine culture

## 2017-09-20 NOTE — H&P ADULT - PROBLEM SELECTOR PLAN 2
- Patient has 3 weeks history of night sweats, dry cough and 10 lb weight loss  - Differential include: osteomyelitis vs TB vs malignancy vs hyperthyroidism  - Osteomyelitis: unlikely as patient currently has no pain, non tender, not erythematous, will follow up blood culture  - Tuberculosis: dry cough, weight loss and night sweat concerning for TB, pending AFB  - Malignancy: patient has no history of smoking, colonoscopy not indicated for his age, no focal lung capacities noted  - Hyperthyroidism: patient has enlarged thyroid gland, will check TSH/T4  - - Patient has 3 weeks history of night sweats, dry cough and 10 lb weight loss  - Differential include: osteomyelitis vs TB vs malignancy   - Osteomyelitis: unlikely as patient currently has no pain, non tender, not erythematous, will follow up blood culture  - Tuberculosis: dry cough, weight loss and night sweat concerning for TB, pending AFB  - Malignancy: patient has no history of smoking, colonoscopy not indicated for his age, no focal lung capacities noted, low suspicion for malignancy - Patient has 3 weeks history of night sweats, dry cough and 10 lb weight loss  - Differential include: osteomyelitis vs TB vs malignancy vs hyperthyroidism  - Osteomyelitis: unlikely as patient currently has no pain, non tender, not erythematous, will follow up blood culture  - Tuberculosis: dry cough, weight loss and night sweat concerning for TB, pending AFB  - Malignancy: patient has no history of smoking, colonoscopy not indicated for his age, no focal lung capacities noted, low suspicion for malignancy  - Hyperthyroidism: clinically euthyroid, will check TSH/T4

## 2017-09-20 NOTE — CONSULT NOTE ADULT - SUBJECTIVE AND OBJECTIVE BOX
HPI:  Patient is a 37 year old man with history of Parkinson and recently admission for osteomyelitis of R 3rd finger s/p PICC presented with recurrent night sweats and fever.     Patient first presented to the ED on August 25 with R 3rd digit swelling and pain that resulted in an abscess due to trauma and was initially started on Bactrim and took only once dose and returned to the ED on August 27 with worsening pain at the finger. He was also found to have gram positive frederick/cocci bacteremia and was admitted and was initially started on Vancomycin and Zosyn. MRI was done on 8/31 and shows abnormal marrow signal and soft tissue swelling concerning for osteomyelitis. Patient was discharged with PICC line for 4 weeks of IV Nafcillin for MSSA.      Patient was to the ED today by visiting nurse due to recurrence of night sweats and subjective fever that has been going on for the past 3 weeks. He was switched to Cefazolin on the day of admission and that resulted in diffuse erythema throughout the body concerning for drug reaction. Patient reports 10 lb unintentional weight loss and dry cough in the past month. Patient has no recent travel history, last trip was to [facility] 8 years ago. Patient has no sick contact at home, he lives with his wife along with 20 other people in the house.      In the ED, temperature oral 36.6. . Bp 148/74. RR 17 saturating at 99% on room air. (20 Sep 2017 10:13)      PAST MEDICAL & SURGICAL HISTORY:  Arthritis  Parkinson disease  H/O inguinal hernia repair      Allergies  cefazolin (Flushing; Rash)        ANTIMICROBIALS:  nafcillin  IVPB    nafcillin  IVPB 2 every 4 hours      OTHER MEDS: MEDICATIONS  (STANDING):  influenza   Vaccine 0.5 once  heparin  Injectable 5000 every 8 hours  carbidopa/levodopa  25/100 1 five times a day  carbidopa/levodopa CR 25/100 1 daily  escitalopram 5 daily  entacapone 200 five times a day  pramipexole 1.5 two times a day  pramipexole 1 daily      SOCIAL HISTORY:  [ ] etoh [ ] tobacco [ ] former smoker [ ] IVDU    FAMILY HISTORY:  No pertinent family history in first degree relatives      REVIEW OF SYSTEMS  [  ] ROS unobtainable because:    [  ] All other systems negative except as noted below:	    Constitutional:  [ ] fever [ ] weight loss  Skin:  [ ] rash [ ] phlebitis	  Eyes: [ ] icterus [ ] inflammation	  ENMT: [ ] discharge [ ] thrush [ ] ulcers [ ] exudates  Respiratory: [ ] dyspnea [ ] hemoptysis [ ] cough [ ] sputum	  Cardiovascular:  [ ] chest pain [ ] palpitations [ ] edema	  Gastrointestinal:  [ ] nausea [ ] vomiting [ ] diarrhea [ ] constipation [ ] pain	  Genitourinary:  [ ] dysuria [ ] frequency [ ] hematuria [ ] discharge [ ] flank pain  Musculoskeletal:  [ ] myalgias [ ] arthralgias [ ] arthritis	  Neurological:  [ ] headache [ ] seizures	  Psychiatric:  [ ] anxiety [ ] depression	  Hematology/Lymphatics:  [ ] lymphadenopathy  Endocrine:  [ ] adrenal [ ] thyroid  Allergic/Immunologic:	 [ ] transplant [ ] seasonal    Vital Signs Last 24 Hrs  T(F): 98.8 (09-20-17 @ 08:18), Max: 99.5 (09-20-17 @ 01:21)    Vital Signs Last 24 Hrs  HR: 76 (09-20-17 @ 08:18) (76 - 107)  BP: 128/67 (09-20-17 @ 08:18) (113/74 - 148/74)  RR: 18 (09-20-17 @ 08:18)  SpO2: 100% (09-20-17 @ 08:18) (99% - 100%)  Wt(kg): --    PHYSICAL EXAM:  General: non-toxic  HEAD/EYES: anicteric, PERRL  ENT:  supple  Cardiovascular:   S1, S2  Respiratory:  clear bilaterally  GI:  soft, non-tender, normal bowel sounds  :  no CVA tenderness   Musculoskeletal:  no synovitis  Neurologic:  grossly non-focal  Skin:  no rash  Lymph: no lymphadenopathy  Psychiatric:  appropriate affect  Vascular:  no phlebitis                                13.1   4.61  )-----------( 285      ( 20 Sep 2017 03:15 )             41.4       09-20    140  |  102  |  17  ----------------------------<  104<H>  4.3   |  25  |  1.12    Ca    9.1      20 Sep 2017 03:15  Phos  3.3     09-20  Mg     2.2     09-20    TPro  7.2  /  Alb  4.3  /  TBili  0.6  /  DBili  x   /  AST  21  /  ALT  7   /  AlkPhos  65  09-20          MICROBIOLOGY:          v            RADIOLOGY: HPI:  Patient is a 37 year old man with history of Parkinson and recently admission for osteomyelitis of R 3rd finger s/p PICC presented with recurrent night sweats and fever.     Patient first presented to the ED on August 25 with R 3rd digit swelling and pain that resulted in an abscess due to trauma and was initially started on Bactrim and took only once dose and returned to the ED on August 27 with worsening pain at the finger. He was also found to have gram positive frederick/cocci bacteremia and was admitted and was initially started on Vancomycin and Zosyn. MRI was done on 8/31 and shows abnormal marrow signal and soft tissue swelling concerning for osteomyelitis. Patient was discharged with PICC line for 4 weeks of IV Nafcillin for MSSA.      Patient was to the ED today by visiting nurse due to recurrence of night sweats and subjective fever that has been going on for the past 3 weeks. He was switched to Cefazolin on the day of admission and that resulted in diffuse erythema throughout the body concerning for drug reaction. Patient reports 10 lb unintentional weight loss and dry cough in the past month. Patient has no recent travel history, last trip was to [facility] 8 years ago. Patient has no sick contact at home, he lives with his wife along with 20 other people in the house.      In the ED, temperature oral 36.6. . Bp 148/74. RR 17 saturating at 99% on room air. (20 Sep 2017 10:13)      PAST MEDICAL & SURGICAL HISTORY:  Arthritis  Parkinson disease  H/O inguinal hernia repair      Allergies  cefazolin (Flushing; Rash)        ANTIMICROBIALS:  nafcillin  IVPB    nafcillin  IVPB 2 every 4 hours      OTHER MEDS: MEDICATIONS  (STANDING):  influenza   Vaccine 0.5 once  heparin  Injectable 5000 every 8 hours  carbidopa/levodopa  25/100 1 five times a day  carbidopa/levodopa CR 25/100 1 daily  escitalopram 5 daily  entacapone 200 five times a day  pramipexole 1.5 two times a day  pramipexole 1 daily      SOCIAL HISTORY:  [ ] etoh [ ] tobacco [ ] former smoker [ ] IVDU    FAMILY HISTORY:  No pertinent family history in first degree relatives      REVIEW OF SYSTEMS  [  ] ROS unobtainable because:    [  ] All other systems negative except as noted below:	    Constitutional:  [ ] fever [ ] weight loss  Skin:  [ ] rash [ ] phlebitis	  Eyes: [ ] icterus [ ] inflammation	  ENMT: [ ] discharge [ ] thrush [ ] ulcers [ ] exudates  Respiratory: [ ] dyspnea [ ] hemoptysis [ ] cough [ ] sputum	  Cardiovascular:  [ ] chest pain [ ] palpitations [ ] edema	  Gastrointestinal:  [ ] nausea [ ] vomiting [ ] diarrhea [ ] constipation [ ] pain	  Genitourinary:  [ ] dysuria [ ] frequency [ ] hematuria [ ] discharge [ ] flank pain  Musculoskeletal:  [ ] myalgias [ ] arthralgias [ ] arthritis	  Neurological:  [ ] headache [ ] seizures	  Psychiatric:  [ ] anxiety [ ] depression	  Hematology/Lymphatics:  [ ] lymphadenopathy  Endocrine:  [ ] adrenal [ ] thyroid  Allergic/Immunologic:	 [ ] transplant [ ] seasonal    Vital Signs Last 24 Hrs  T(F): 98.8 (09-20-17 @ 08:18), Max: 99.5 (09-20-17 @ 01:21)    Vital Signs Last 24 Hrs  HR: 76 (09-20-17 @ 08:18) (76 - 107)  BP: 128/67 (09-20-17 @ 08:18) (113/74 - 148/74)  RR: 18 (09-20-17 @ 08:18)  SpO2: 100% (09-20-17 @ 08:18) (99% - 100%)  Wt(kg): --    PHYSICAL EXAM:  General: non-toxic  HEAD/EYES: anicteric, PERRL  ENT:  supple  Cardiovascular:   S1, S2  Respiratory:  clear bilaterally  GI:  soft, non-tender, normal bowel sounds  :  no CVA tenderness   Musculoskeletal:  no synovitis  Neurologic:  grossly non-focal  Skin:  no rash  Lymph: no lymphadenopathy  Psychiatric:  appropriate affect  Vascular:  no phlebitis                                13.1   4.61  )-----------( 285      ( 20 Sep 2017 03:15 )             41.4       09-20    140  |  102  |  17  ----------------------------<  104<H>  4.3   |  25  |  1.12    Ca    9.1      20 Sep 2017 03:15  Phos  3.3     09-20  Mg     2.2     09-20    TPro  7.2  /  Alb  4.3  /  TBili  0.6  /  DBili  x   /  AST  21  /  ALT  7   /  AlkPhos  65  09-20          MICROBIOLOGY:    Blood Cultures (Peripheral) - 9/20/17 - 2/2 - NGTD    RADIOLOGY:    EXAM:  RAD CHEST PORTABLE ROUTINE    PROCEDURE DATE:  Sep 20 2017   There are no focal lung opacities. HPI:    37 year old male with PMH PD and recent treatment for OM of R 3rd finger s/p PICC and on IV Nafcillin who presented to the ED due to drug rash. The     Patient is a 37 year old man with history of Parkinson and recently admission for osteomyelitis of R 3rd finger s/p PICC presented with recurrent night sweats and fever.     Patient first presented to the ED on August 25 with R 3rd digit swelling and pain that resulted in an abscess due to trauma and was initially started on Bactrim and took only once dose and returned to the ED on August 27 with worsening pain at the finger. He was also found to have gram positive frederick/cocci bacteremia and was admitted and was initially started on Vancomycin and Zosyn. MRI was done on 8/31 and shows abnormal marrow signal and soft tissue swelling concerning for osteomyelitis. Patient was discharged with PICC line for 4 weeks of IV Nafcillin for MSSA.      Patient was to the ED today by visiting nurse due to recurrence of night sweats and subjective fever that has been going on for the past 3 weeks. He was switched to Cefazolin on the day of admission and that resulted in diffuse erythema throughout the body concerning for drug reaction. Patient reports 10 lb unintentional weight loss and dry cough in the past month. Patient has no recent travel history, last trip was to [place] 8 years ago. Patient has no sick contact at home, he lives with his wife along with 20 other people in the house.      In the ED, temperature oral 36.6. . Bp 148/74. RR 17 saturating at 99% on room air. (20 Sep 2017 10:13)      PAST MEDICAL & SURGICAL HISTORY:  Arthritis  Parkinson disease  H/O inguinal hernia repair      Allergies  cefazolin (Flushing; Rash)        ANTIMICROBIALS:  nafcillin  IVPB    nafcillin  IVPB 2 every 4 hours      OTHER MEDS: MEDICATIONS  (STANDING):  influenza   Vaccine 0.5 once  heparin  Injectable 5000 every 8 hours  carbidopa/levodopa  25/100 1 five times a day  carbidopa/levodopa CR 25/100 1 daily  escitalopram 5 daily  entacapone 200 five times a day  pramipexole 1.5 two times a day  pramipexole 1 daily      SOCIAL HISTORY:  [ ] etoh [ ] tobacco [ ] former smoker [ ] IVDU    FAMILY HISTORY:  No pertinent family history in first degree relatives      REVIEW OF SYSTEMS  [  ] ROS unobtainable because:    [  ] All other systems negative except as noted below:	    Constitutional:  [ ] fever [ ] weight loss  Skin:  [ ] rash [ ] phlebitis	  Eyes: [ ] icterus [ ] inflammation	  ENMT: [ ] discharge [ ] thrush [ ] ulcers [ ] exudates  Respiratory: [ ] dyspnea [ ] hemoptysis [ ] cough [ ] sputum	  Cardiovascular:  [ ] chest pain [ ] palpitations [ ] edema	  Gastrointestinal:  [ ] nausea [ ] vomiting [ ] diarrhea [ ] constipation [ ] pain	  Genitourinary:  [ ] dysuria [ ] frequency [ ] hematuria [ ] discharge [ ] flank pain  Musculoskeletal:  [ ] myalgias [ ] arthralgias [ ] arthritis	  Neurological:  [ ] headache [ ] seizures	  Psychiatric:  [ ] anxiety [ ] depression	  Hematology/Lymphatics:  [ ] lymphadenopathy  Endocrine:  [ ] adrenal [ ] thyroid  Allergic/Immunologic:	 [ ] transplant [ ] seasonal    Vital Signs Last 24 Hrs  T(F): 98.8 (09-20-17 @ 08:18), Max: 99.5 (09-20-17 @ 01:21)    Vital Signs Last 24 Hrs  HR: 76 (09-20-17 @ 08:18) (76 - 107)  BP: 128/67 (09-20-17 @ 08:18) (113/74 - 148/74)  RR: 18 (09-20-17 @ 08:18)  SpO2: 100% (09-20-17 @ 08:18) (99% - 100%)  Wt(kg): --    PHYSICAL EXAM:  General: non-toxic  HEAD/EYES: anicteric, PERRL  ENT:  supple  Cardiovascular:   S1, S2  Respiratory:  clear bilaterally  GI:  soft, non-tender, normal bowel sounds  :  no CVA tenderness   Musculoskeletal:  no synovitis  Neurologic:  grossly non-focal  Skin:  no rash  Lymph: no lymphadenopathy  Psychiatric:  appropriate affect  Vascular:  no phlebitis                                13.1   4.61  )-----------( 285      ( 20 Sep 2017 03:15 )             41.4       09-20    140  |  102  |  17  ----------------------------<  104<H>  4.3   |  25  |  1.12    Ca    9.1      20 Sep 2017 03:15  Phos  3.3     09-20  Mg     2.2     09-20    TPro  7.2  /  Alb  4.3  /  TBili  0.6  /  DBili  x   /  AST  21  /  ALT  7   /  AlkPhos  65  09-20          MICROBIOLOGY:    Blood Cultures (Peripheral) - 9/20/17 - 2/2 - NGTD    RADIOLOGY:    EXAM:  RAD CHEST PORTABLE ROUTINE    PROCEDURE DATE:  Sep 20 2017   There are no focal lung opacities. HPI:    37 year old male with PMH PD and recent treatment for OM of R 3rd finger s/p PICC and on IV Nafcillin who presented to the ED due to drug rash. The patient had R 3rd digit swelling on 8/25 and initially started on bactrim but presented back to [place] on 8/27 with worsening pain. Found to have MSSA bacteremia there. Treated with Vanc/Zosyn while admitted and then switched to Nafcilin to target the MSSA. Discharged on 9/1/17. While at home patient noted weight loss of ~10 pounds and night sweats. He never took his temperature during this time.     Patient was to the ED today by visiting nurse due to recurrence of night sweats and subjective fever that has been going on for the past 3 weeks. He was switched to Cefazolin on the day of admission and that resulted in diffuse erythema throughout the body concerning for drug reaction. Patient reports 10 lb unintentional weight loss and dry cough in the past month. Patient has no recent travel history, last trip was to [facility] 8 years ago. Patient has no sick contact at home, he lives with his wife along with 20 other people in the house.      PAST MEDICAL & SURGICAL HISTORY:  Arthritis  Parkinson disease  H/O inguinal hernia repair      Allergies  cefazolin (Flushing; Rash)        ANTIMICROBIALS:  nafcillin  IVPB    nafcillin  IVPB 2 every 4 hours      OTHER MEDS: MEDICATIONS  (STANDING):  influenza   Vaccine 0.5 once  heparin  Injectable 5000 every 8 hours  carbidopa/levodopa  25/100 1 five times a day  carbidopa/levodopa CR 25/100 1 daily  escitalopram 5 daily  entacapone 200 five times a day  pramipexole 1.5 two times a day  pramipexole 1 daily      SOCIAL HISTORY:  [ ] etoh [ ] tobacco [ ] former smoker [ ] IVDU    FAMILY HISTORY:  No pertinent family history in first degree relatives      REVIEW OF SYSTEMS  [  ] ROS unobtainable because:    [  ] All other systems negative except as noted below:	    Constitutional:  [ ] fever [ ] weight loss  Skin:  [ ] rash [ ] phlebitis	  Eyes: [ ] icterus [ ] inflammation	  ENMT: [ ] discharge [ ] thrush [ ] ulcers [ ] exudates  Respiratory: [ ] dyspnea [ ] hemoptysis [ ] cough [ ] sputum	  Cardiovascular:  [ ] chest pain [ ] palpitations [ ] edema	  Gastrointestinal:  [ ] nausea [ ] vomiting [ ] diarrhea [ ] constipation [ ] pain	  Genitourinary:  [ ] dysuria [ ] frequency [ ] hematuria [ ] discharge [ ] flank pain  Musculoskeletal:  [ ] myalgias [ ] arthralgias [ ] arthritis	  Neurological:  [ ] headache [ ] seizures	  Psychiatric:  [ ] anxiety [ ] depression	  Hematology/Lymphatics:  [ ] lymphadenopathy  Endocrine:  [ ] adrenal [ ] thyroid  Allergic/Immunologic:	 [ ] transplant [ ] seasonal    Vital Signs Last 24 Hrs  T(F): 98.8 (09-20-17 @ 08:18), Max: 99.5 (09-20-17 @ 01:21)    Vital Signs Last 24 Hrs  HR: 76 (09-20-17 @ 08:18) (76 - 107)  BP: 128/67 (09-20-17 @ 08:18) (113/74 - 148/74)  RR: 18 (09-20-17 @ 08:18)  SpO2: 100% (09-20-17 @ 08:18) (99% - 100%)  Wt(kg): --    PHYSICAL EXAM:  General: non-toxic  HEAD/EYES: anicteric, PERRL  ENT:  supple  Cardiovascular:   S1, S2  Respiratory:  clear bilaterally  GI:  soft, non-tender, normal bowel sounds  :  no CVA tenderness   Musculoskeletal:  no synovitis  Neurologic:  grossly non-focal  Skin:  no rash  Lymph: no lymphadenopathy  Psychiatric:  appropriate affect  Vascular:  no phlebitis                                13.1   4.61  )-----------( 285      ( 20 Sep 2017 03:15 )             41.4       09-20    140  |  102  |  17  ----------------------------<  104<H>  4.3   |  25  |  1.12    Ca    9.1      20 Sep 2017 03:15  Phos  3.3     09-20  Mg     2.2     09-20    TPro  7.2  /  Alb  4.3  /  TBili  0.6  /  DBili  x   /  AST  21  /  ALT  7   /  AlkPhos  65  09-20          MICROBIOLOGY:    Blood Cultures (Peripheral) - 9/20/17 - 2/2 - NGTD    RADIOLOGY:    EXAM:  RAD CHEST PORTABLE ROUTINE    PROCEDURE DATE:  Sep 20 2017   There are no focal lung opacities. HPI:    37 year old male with PMH PD and recent treatment for OM of R 3rd finger s/p PICC and on IV Nafcillin who presented to the ED due to drug rash. The patient had R 3rd digit swelling on 8/25 and initially started on bactrim but presented back to [place] on 8/27 with worsening pain. Found to have MSSA bacteremia there. Treated with Vanc/Zosyn while admitted and then switched to Nafcilin to target the MSSA. Discharged on 9/1/17. While at home patient noted weight loss of ~10 pounds and night sweats. He never took his temperature during this time.     Patient was to the ED today by visiting nurse due to recurrence of night sweats and subjective fever that has been going on for the past 3 weeks. He was switched to Cefazolin on the day of admission and that resulted in diffuse erythema throughout the body concerning for drug reaction. Patient reports 10 lb unintentional weight loss and dry cough in the past month. Patient has no recent travel history, last trip was to [facility] 8 years ago. Patient has no sick contact at home.    PAST MEDICAL & SURGICAL HISTORY:  Arthritis  Parkinson disease  H/O inguinal hernia repair      Allergies  cefazolin (Flushing; Rash)        ANTIMICROBIALS:  nafcillin  IVPB    nafcillin  IVPB 2 every 4 hours      OTHER MEDS: MEDICATIONS  (STANDING):  influenza   Vaccine 0.5 once  heparin  Injectable 5000 every 8 hours  carbidopa/levodopa  25/100 1 five times a day  carbidopa/levodopa CR 25/100 1 daily  escitalopram 5 daily  entacapone 200 five times a day  pramipexole 1.5 two times a day  pramipexole 1 daily      SOCIAL HISTORY:  No smoking history. No EtOH use. No recreational drug use. No recent travel. Born in the . PPD negative in the past     FAMILY HISTORY:  No pertinent family history in first degree relatives      REVIEW OF SYSTEMS  [  ] ROS unobtainable because:    [  ] All other systems negative except as noted below:	    Constitutional:  [ ] fever [ ] weight loss  Skin:  [ ] rash [ ] phlebitis	  Eyes: [ ] icterus [ ] inflammation	  ENMT: [ ] discharge [ ] thrush [ ] ulcers [ ] exudates  Respiratory: [ ] dyspnea [ ] hemoptysis [ ] cough [ ] sputum	  Cardiovascular:  [ ] chest pain [ ] palpitations [ ] edema	  Gastrointestinal:  [ ] nausea [ ] vomiting [ ] diarrhea [ ] constipation [ ] pain	  Genitourinary:  [ ] dysuria [ ] frequency [ ] hematuria [ ] discharge [ ] flank pain  Musculoskeletal:  [ ] myalgias [ ] arthralgias [ ] arthritis	  Neurological:  [ ] headache [ ] seizures	  Psychiatric:  [ ] anxiety [ ] depression	  Hematology/Lymphatics:  [ ] lymphadenopathy  Endocrine:  [ ] adrenal [ ] thyroid  Allergic/Immunologic:	 [ ] transplant [ ] seasonal    Vital Signs Last 24 Hrs  T(F): 98.8 (09-20-17 @ 08:18), Max: 99.5 (09-20-17 @ 01:21)    Vital Signs Last 24 Hrs  HR: 76 (09-20-17 @ 08:18) (76 - 107)  BP: 128/67 (09-20-17 @ 08:18) (113/74 - 148/74)  RR: 18 (09-20-17 @ 08:18)  SpO2: 100% (09-20-17 @ 08:18) (99% - 100%)  Wt(kg): --    PHYSICAL EXAM:  General: non-toxic  HEAD/EYES: anicteric, PERRL  ENT:  supple  Cardiovascular:   S1, S2  Respiratory:  clear bilaterally  GI:  soft, non-tender, normal bowel sounds  :  no CVA tenderness   Musculoskeletal:  no synovitis  Neurologic:  grossly non-focal  Skin:  no rash  Lymph: no lymphadenopathy  Psychiatric:  appropriate affect  Vascular:  no phlebitis                                13.1   4.61  )-----------( 285      ( 20 Sep 2017 03:15 )             41.4       09-20    140  |  102  |  17  ----------------------------<  104<H>  4.3   |  25  |  1.12    Ca    9.1      20 Sep 2017 03:15  Phos  3.3     09-20  Mg     2.2     09-20    TPro  7.2  /  Alb  4.3  /  TBili  0.6  /  DBili  x   /  AST  21  /  ALT  7   /  AlkPhos  65  09-20          MICROBIOLOGY:    Blood Cultures (Peripheral) - 9/20/17 - 2/2 - NGTD    RADIOLOGY:    EXAM:  RAD CHEST PORTABLE ROUTINE    PROCEDURE DATE:  Sep 20 2017   There are no focal lung opacities. HPI:    37 year old male with PMH PD and recent treatment for OM of R 3rd finger s/p PICC and on IV Nafcillin who presented to the ED due to drug rash. The patient had R 3rd digit swelling on 8/25 and initially started on bactrim but presented back to [place] on 8/27 with worsening pain. Found to have MSSA bacteremia there. Treated with Vanc/Zosyn while admitted and then switched to Nafcilin to target the MSSA. Discharged on 9/1/17. While at home patient noted weight loss of ~10 pounds and night sweats. He never took his temperature during this time.     Patient was to the ED today by visiting nurse due to recurrence of night sweats and subjective fever that has been going on for the past 3 weeks. He was switched to Cefazolin on the day of admission and that resulted in diffuse erythema throughout the body concerning for drug reaction. Patient reports 10 lb unintentional weight loss and dry cough in the past month. Patient has no recent travel history, last trip was to [facility] 8 years ago. Patient has no sick contact at home.    PAST MEDICAL & SURGICAL HISTORY:  Arthritis  Parkinson disease  H/O inguinal hernia repair      Allergies  cefazolin (Flushing; Rash)        ANTIMICROBIALS:  nafcillin  IVPB    nafcillin  IVPB 2 every 4 hours      OTHER MEDS: MEDICATIONS  (STANDING):  influenza   Vaccine 0.5 once  heparin  Injectable 5000 every 8 hours  carbidopa/levodopa  25/100 1 five times a day  carbidopa/levodopa CR 25/100 1 daily  escitalopram 5 daily  entacapone 200 five times a day  pramipexole 1.5 two times a day  pramipexole 1 daily      SOCIAL HISTORY:  No smoking history. No EtOH use. No recreational drug use. No recent travel. Born in the . PPD negative in the past     FAMILY HISTORY:  No pertinent family history in first degree relatives      REVIEW OF SYSTEMS  [  ] ROS unobtainable because:    [  x] All other systems negative except as noted below:	    Constitutional:  [ ] fever [ ] weight loss + night sweats  Skin:  [ x] rash [ ] phlebitis	  Eyes: [ ] icterus [ ] inflammation	  ENMT: [ ] discharge [ ] thrush [ ] ulcers [ ] exudates  Respiratory: [ ] dyspnea [ ] hemoptysis [ ] cough [ ] sputum	  Cardiovascular:  [ ] chest pain [ ] palpitations [ ] edema	  Gastrointestinal:  [ ] nausea [ ] vomiting [ ] diarrhea [ ] constipation [ ] pain	  Genitourinary:  [ ] dysuria [ ] frequency [ ] hematuria [ ] discharge [ ] flank pain  Musculoskeletal:  [ ] myalgias [ ] arthralgias [ ] arthritis	  Neurological:  [ ] headache [ ] seizures	  Psychiatric:  [ ] anxiety [ ] depression	  Hematology/Lymphatics:  [ ] lymphadenopathy  Endocrine:  [ ] adrenal [ ] thyroid  Allergic/Immunologic:	 [ ] transplant [ ] seasonal    Vital Signs Last 24 Hrs  T(F): 98.8 (09-20-17 @ 08:18), Max: 99.5 (09-20-17 @ 01:21)    Vital Signs Last 24 Hrs  HR: 76 (09-20-17 @ 08:18) (76 - 107)  BP: 128/67 (09-20-17 @ 08:18) (113/74 - 148/74)  RR: 18 (09-20-17 @ 08:18)  SpO2: 100% (09-20-17 @ 08:18) (99% - 100%)  Wt(kg): --    PHYSICAL EXAMINATION:  General: AAO3X, NAD  HEENT: PERRL, EOMI, No subconjunctival hemorrhages, Oropharynx Clear, MMM  Neck: Supple, No MICHEAL  Cardiac: RRR, No M/R/G  Resp: CTAB, No Wh/Rh/Ra  Abdomen: NBS, NT/ND, No HSM, No rigidity or guarding  MSK: third finger on RUE without cellulitic changes or draining wound. missing toenail on third digit on LLE, no wound or drainage. No LE edema. No stigmata of IE. No evidence of phlebitis. No evidence of synovitis.  Back: No CVA Tenderness  Skin: Faint macular rash on the back. Skin is warm and dry to the touch.   Neuro: AAO3X. CN 2-12 Grossly intact. Moves all four extremities spontaneously.  Psych: Flat affect, Calm, Pleasant, Cooperative                              13.1   4.61  )-----------( 285      ( 20 Sep 2017 03:15 )             41.4       09-20    140  |  102  |  17  ----------------------------<  104<H>  4.3   |  25  |  1.12    Ca    9.1      20 Sep 2017 03:15  Phos  3.3     09-20  Mg     2.2     09-20    TPro  7.2  /  Alb  4.3  /  TBili  0.6  /  DBili  x   /  AST  21  /  ALT  7   /  AlkPhos  65  09-20          MICROBIOLOGY:    Blood Cultures (Peripheral) - 9/20/17 - 2/2 - NGTD    RADIOLOGY:    EXAM:  RAD CHEST PORTABLE ROUTINE    PROCEDURE DATE:  Sep 20 2017   There are no focal lung opacities.

## 2017-09-20 NOTE — ED PROVIDER NOTE - CARE PLAN
Principal Discharge DX:	Adverse effect of drug, initial encounter  Goal:	Resolution of rash  Instructions for follow-up, activity and diet:	You were found to have a rash, likely because of use of an antibiotic called cefazolin, which you were switched to the day of admission.  Please inform all your future doctors about your allergy, and avoid taking related antibiotics in the future.

## 2017-09-20 NOTE — CONSULT NOTE ADULT - ASSESSMENT
37 year old male with PMH PD and recent treatment for OM of R 3rd finger s/p PICC and on IV Nafcillin who presented to the ED due to drug rash. Suspect that his night sweats may be related to him mounting a reaction to Nafcillin which ultimately erupted in drug rash prior to presentation. Lower suspicion for TB as patient has clear CXR and was [place]. Would switch to Vancomycin in the interim given the allergy. Would obtain TTE to help rule out endocarditis  --Stop Nafcillin  --Start Vancomycin 1g IV Q12H. Trough prior to fourth dose  --Please obtain TTE

## 2017-09-20 NOTE — ED PROVIDER NOTE - GENITOURINARY [+], MLM
More yellow urine, patient was told by provider that this is an expected consequence of new medication

## 2017-09-20 NOTE — H&P ADULT - ASSESSMENT
Patient is a 37 year old man with history of Parkinson disease and recently diagnosed osteomyelitis of R 3rd digit treated with IV Nafcillin now presented with recurrent fever and night sweats.

## 2017-09-20 NOTE — H&P ADULT - NSHPREVIEWOFSYSTEMS_GEN_ALL_CORE
Constitutional: [ ] negative [x ] fevers [ x] chills [ ] 10 lb weight loss in the past month [ ] weight gain  HEENT: [x ] negative [ ] dry eyes [ ] eye irritation [ ] postnasal drip [ ] nasal congestion  CV: [x ] negative  [ ] chest pain [ ] orthopnea [ ] palpitations [ ] murmur  Resp: [ ] negative [ x] dry cough [s ] shortness of breath on exertion [ ] dyspnea [ ] wheezing [ ] sputum [ ] hemoptysis  GI: [x ] negative [ ] nausea [ ] vomiting [ ] diarrhea [ ] constipation [ ] abd pain [ ] dysphagia   : [x ] negative [ ] dysuria [ ] nocturia [ ] hematuria [ ] increased urinary frequency  Musculoskeletal: [ x] negative [ ] back pain [ ] myalgias [ ] arthralgias [ ] fracture  Skin: [x ] negative [ ] rash [ ] itch  Neurological: [x ] negative [ ] headache [ ] dizziness [ ] syncope [ ] weakness [ ] numbness  Psychiatric: [x ] negative [ ] anxiety [ ] depression  Endocrine: [ x] negative [ ] diabetes [ ] thyroid problem  Hematologic/Lymphatic: [x ] negative [ ] anemia [ ] bleeding problem  Allergic/Immunologic: [x ] negative [ ] itchy eyes [ ] nasal discharge [ ] hives [ ] angioedema  [ x] All other systems negative  [ ] Unable to assess ROS because ________ Constitutional: [ ] negative [x ] fevers [ x] chills [ ] 10 lb weight loss in the past month [ ] weight gain  HEENT: [x ] negative [ ] dry eyes [ ] eye irritation [ ] postnasal drip [ ] nasal congestion  CV: [x ] negative  [  chest pain [ ] orthopnea [ ] palpitations [ ] murmur  Resp: [ ] negative [ x] dry cough [s ] shortness of breath on exertion [ ] dyspnea [ ] wheezing [ ] sputum [ ] hemoptysis  GI: [x ] negative [ ] nausea [ ] vomiting [ ] diarrhea [ ] constipation [ ] abd pain [ ] dysphagia   : [x ] negative [ ] dysuria [ ] nocturia [ ] hematuria [ ] increased urinary frequency  Musculoskeletal: [ x] negative [ ] back pain [ ] myalgias [ ] arthralgias [ ] fracture  Skin: [x ] negative [ ] rash [ ] itch  Neurological: [x ] negative [ ] headache [ ] dizziness [ ] syncope [ ] weakness [ ] numbness  Psychiatric: [x ] negative [ ] anxiety [ ] depression  Endocrine: [ x] negative [ ] diabetes [ ] thyroid problem  Hematologic/Lymphatic: [x ] negative [ ] anemia [ ] bleeding problem  Allergic/Immunologic: [x ] negative [ ] itchy eyes [ ] nasal discharge [ ] hives [ ] angioedema  [ ] All other systems negative  [ ] Unable to assess ROS because ________

## 2017-09-20 NOTE — ED PROVIDER NOTE - OBJECTIVE STATEMENT
Patient is a 38 yo M with PMHx Parkinson's, recently diagnosed OM of right first digit, s/p PICC on long term antibiotics with Nafcillin, switched day of admission to Cefazolin presenting to ED because of recurring night sweats and subjective fevers, was told to come in by visiting nurse for r/o TB.  On inspection in ED, found to have warmth of extremities, torso, and back, with associated erythema, concerning for drug reaction. Patient is a 38 yo M with PMHx Parkinson's, recently diagnosed OM of right first digit, s/p PICC on long term antibiotics with Nafcillin, switched day of admission to Cefazolin presenting to ED because of recurring night sweats and subjective fevers, was told to come in by visiting nurse for r/o TB.  On inspection in ED, found to have warmth of extremities, torso, and back, with associated erythema, concerning for drug reaction.    Klepfish: 37M PMH parkinsons p/w sweats. Recently diagnosed OM of R 3rd finger, bacteremia, s/p PICC, changed from nafcillin to cefazolin 2/2 persistent recurrent night sweats and subjective fever. Pt also notes slightly pruritic rash on UE and back since starting the abx. No facial/throat swelling, lightheaded. C/o minimal cough over last 3-4d. Denies SOB/CP, abd pain, urinary complaints. Finger pain has improved and looks better. Patient is a 36 yo M with PMHx Parkinson's, recently diagnosed OM of right third digit, s/p PICC on long term antibiotics with Nafcillin, switched day of admission to Cefazolin presenting to ED because of recurring night sweats and subjective fevers, was told to come in by visiting nurse for r/o TB.  On inspection in ED, found to have warmth of extremities, torso, and back, with associated erythema, concerning for drug reaction.    Klepfish: 37M PMH parkinsons p/w sweats. Recently diagnosed OM of R 3rd finger, bacteremia, s/p PICC, changed from nafcillin to cefazolin 2/2 persistent recurrent night sweats and subjective fever. Pt also notes slightly pruritic rash on UE and back since starting the abx. No facial/throat swelling, lightheaded. C/o minimal cough over last 3-4d. Denies SOB/CP, abd pain, urinary complaints. Finger pain has improved and looks better.

## 2017-09-20 NOTE — H&P ADULT - PROBLEM SELECTOR PLAN 4
- DVT prophylaxis: Heparin sub Q    Muriel Alves PGY-1  [facility] Medicine Team 2  Pager [phone]. Spectra [phone]

## 2017-09-20 NOTE — H&P ADULT - FAMILY HISTORY
No pertinent family history in first degree relatives Aunt  Still living? Unknown  Family history of primary tuberculosis, Age at diagnosis: Age Unknown

## 2017-09-20 NOTE — ED PROVIDER NOTE - MEDICAL DECISION MAKING DETAILS
Patient is a 38 yo M with PMHx Parkinson's and recently diagnosed Osteomyelitis, on long term antibiotics via picc, switched to Cefazolin resulting in drug rash. Admitted for r/o infection and management of drug reaction.   Will check CBC, CMP, Blood and urine Cultures in work-up for new infection  Benadryl 50 IV and Pepcid 20 IV Patient is a 38 yo M with PMHx Parkinson's and recently diagnosed Osteomyelitis, on long term antibiotics via picc, switched to Cefazolin resulting in drug rash. Admitted for r/o infection and for management of drug reaction.   Will check CBC, CMP, Blood and urine Cultures in work-up for new infection  Benadryl 50 IV and Pepcid 20 IV

## 2017-09-20 NOTE — ED PROVIDER NOTE - ATTENDING CONTRIBUTION TO CARE
37M PMH parkinsons, recently diagnosed OM of R 3rd finger, bacteremia, s/p PICC, changed from nafcillin to cefazolin 2/2 persistent recurrent night sweats and subjective fever, p/w recurrent night sweats and subjective fever. Minimal cough. Slight tachycardia, other vitals wnl, exam as above.  rash: Likely 2/2 drug rexn to cefazolin. No s/s of anaphylaxis. Benadryl, pepcid, reassess.  Sweats: Possible recurrent bacteremia/abx failure.   CBC, cmp, vbg comp, blood cx. CXR. IVF, reassess.   Pt likely requires admission to ensure sw/proper home abx.

## 2017-09-20 NOTE — ED PROVIDER NOTE - PLAN OF CARE
Resolution of rash You were found to have a rash, likely because of use of an antibiotic called cefazolin, which you were switched to the day of admission.  Please inform all your future doctors about your allergy, and avoid taking related antibiotics in the future.

## 2017-09-20 NOTE — H&P ADULT - NSHPLABSRESULTS_GEN_ALL_CORE
MRI Upper extremities 8/31  1.  Skin defect at the ulnar third digit with soft tissue swelling in keeping with synovitis.  2.  Phlegmonous change along the ulnar aspect of the third digit. Limited evaluation for abscess without IV contrast. No definite drainable fluid collection.  3.  Abnormal marrow signal within the third middle and distal phalanges in keeping with osteomyelitis.

## 2017-09-20 NOTE — CONSULT NOTE ADULT - ATTENDING COMMENTS
37 year old with MSSA bacteremia due to finger abscess with associated OM.    Given his night sweats, I would check an echo.    He had a drug rash to cefazolin    Change to vanco 1 gm iv q 12 through 10/4 with weekly vanco through, cbc, bmp fax to [phone]    Outpatient allergy eval for skin testing to pcn/ cephalosporin.

## 2017-09-20 NOTE — H&P ADULT - NSHPPHYSICALEXAM_GEN_ALL_CORE
General: Alert and cooperative. Not in acute stress.   Head: Normocephalic, no mass and lesions.   Eyes: Intact visual fields. PERRLA, clear conjunctiva. EOMI, no ptosis.   Throat: Oral cavity and pharynx normal. No inflammation, swelling, exudate, or lesions. Teeth and gingiva in good general condition.  Neck: No lymphadenopathy, no masses, thyromegaly. Carotid pulses 2+. No JVD.   Respiratory: Bilateral lung clear to auscultation, no crackles, no wheezes, no rhonchi.   Cardiovascular: S1/S2 auscultated, no murmur, or gallop. Rhythm is regular. There is no peripheral edema.  Abdomen: Soft, non-tender, nondistended, no guarding or rebound tenderness. Active bowel sounds in all 4 quadrants.   Extremities: No significant deformity or joint abnormality. Peripheral pulses intact. No varicosities. No peripheral edema, atrophy. Skin: Intact, no rash. Normal color, texture and turgor with no lesions or eruptions.  Neurological: AOAx4. CN2-12 grosslly intact. Strength and sensation symmetric and intact throughout. Reflexes 2+ throughout. Cogwheel rigidity noted. General: Alert and cooperative. Not in acute stress.   Head: Normocephalic, no mass and lesions.   Eyes: PERRLA, clear conjunctiva. EOMI, no ptosis.   Throat: Oral cavity and pharynx normal. No inflammation, swelling, exudate, or lesions. Teeth and gingiva in good general condition.  Neck: No lymphadenopathy, no masses, thyromegaly but no nodules appreciated. Carotid pulses 2+. No JVD.   Respiratory: Bilateral lung clear to auscultation, no crackles, no wheezes, no rhonchi.   Cardiovascular: S1/S2 auscultated, no murmur, or gallop. Rhythm is regular. There is no peripheral edema.  Abdomen: Soft, non-tender, nondistended, no guarding or rebound tenderness. Active bowel sounds in all 4 quadrants.   Extremities: No significant deformity or joint abnormality. Peripheral pulses intact. No varicosities. No peripheral edema, atrophy.   Skin: Intact, excoriation at the upper back. R third finger was noted to have scarring, no erythema, non tender.   Neurological: AOAx4. CN2-12 grosslly intact. Strength and sensation symmetric and intact throughout. Reflexes 2+ throughout. Cogwheel rigidity, minimal resting tremor.

## 2017-09-20 NOTE — H&P ADULT - HISTORY OF PRESENT ILLNESS
Patient is a 37 year old man with history of Parkinson and recently admission for osteomyelitis of R 3rd finger s/p PICC presented with recurrent night sweats and fever.     Patient first presented to the ED on August 25 with R 3rd digit swelling and pain that resulted in an abscess due to trauma and was initially started on Bactrim and took only once dose and returned to the ED on August 27 with worsening pain at the finger. He was also found to have gram positive frederick/cocci bacteremia and was admitted and was initially started on Vancomycin and Zosyn. MRI was done on 8/31 and shows abnormal marrow signal and soft tissue swelling concerning for osteomyelitis. Patient was discharged with PICC line for 4 weeks of IV Nafcillin for MSSA.      Patient was to the ED today by visiting nurse due to recurrence of night sweats and subjective fever that has been going on for the past 3 weeks. He was switched to Cefazolin on the day of admission and that resulted in diffuse erythema throughout the body concerning for drug reaction. Patient reports 10 lb unintentional weight loss and dry cough in the past month.     In the ED, temperature oral 36.6. . Bp 148/74. RR 17 saturating at 99% on room air. Patient is a 37 year old man with history of Parkinson and recently admission for osteomyelitis of R 3rd finger s/p PICC presented with recurrent night sweats and fever.     Patient first presented to the ED on August 25 with R 3rd digit swelling and pain that resulted in an abscess due to trauma and was initially started on Bactrim and took only once dose and returned to the ED on August 27 with worsening pain at the finger. He was also found to have gram positive frederick/cocci bacteremia and was admitted and was initially started on Vancomycin and Zosyn. MRI was done on 8/31 and shows abnormal marrow signal and soft tissue swelling concerning for osteomyelitis. Patient was discharged with PICC line for 4 weeks of IV Nafcillin for MSSA.      Patient was to the ED today by visiting nurse due to recurrence of night sweats and subjective fever that has been going on for the past 3 weeks. He was switched to Cefazolin on the day of admission and that resulted in diffuse erythema throughout the body concerning for drug reaction. Patient reports 10 lb unintentional weight loss and dry cough in the past month. Patient has no recent travel history, last trip was to [place] 8 years ago. Patient has no sick contact at home, he lives with his wife along with 20 other people in the house.      In the ED, temperature oral 36.6. . Bp 148/74. RR 17 saturating at 99% on room air.

## 2017-09-20 NOTE — H&P ADULT - ATTENDING COMMENTS
36yo M w/ PD was on naficillin for recently diagnosed R 3rd finger OM, was switched to cefazolin by his outpatient ID doctor the day of admission due to patient complaints of night sweats w/ nafcillin, however pt s/p drug rash reaction to the cefazolin, here for further evaluation     Unclear hx and patient not always consistent in his description of his symptoms. Initially patient states that he started having night sweats from his previous admission while receiving nafcillin. On further questioning he states that it occurs every time he gets a dose maybe 5-10 minutes after.     Initially, some concern for TB given patients reported hx of night sweats, weight loss, cough, reported family hx of several members that were once treated for TB and live in the same house he currently lives in. However, on further clarification of hx pt states sweats/subjective fever occurs after each dose of nafcillin, patient has been having decreased appetite which could explain his weight loss, he has a dry cough but no SOB and CXR unremarkable, which makes TB less likely.    ID recs appreciated. Will treat patients OM w/ vancomycin for now.  Will f/u blood cxs  F/u TTE

## 2017-09-20 NOTE — ED ADULT NURSE NOTE - OBJECTIVE STATEMENT
Pt presents to ED##22 Aox4, in NAD, c/o subjective fevers today with SOB,. as per patient "I cant take a deep breath." Rash- hives noted to B/L upper extremities  and back. States he started new abx today. Has PICC line R upper arm- treated x2 weeks for osteomyelitis in R digit #2. Pt denies abd pain/ N/V/D/ current SOB/ CP/ cough/ urinary symptoms/ other acute complaints. VSS. Awaiting MD kirby. Will continue to monitor.

## 2017-09-21 LAB
BUN SERPL-MCNC: 10 MG/DL — SIGNIFICANT CHANGE UP (ref 7–23)
CALCIUM SERPL-MCNC: 8.5 MG/DL — SIGNIFICANT CHANGE UP (ref 8.4–10.5)
CHLORIDE SERPL-SCNC: 102 MMOL/L — SIGNIFICANT CHANGE UP (ref 98–107)
CO2 SERPL-SCNC: 21 MMOL/L — LOW (ref 22–31)
CREAT SERPL-MCNC: 0.7 MG/DL — SIGNIFICANT CHANGE UP (ref 0.5–1.3)
GLUCOSE SERPL-MCNC: 103 MG/DL — HIGH (ref 70–99)
MAGNESIUM SERPL-MCNC: 2.1 MG/DL — SIGNIFICANT CHANGE UP (ref 1.6–2.6)
PHOSPHATE SERPL-MCNC: 3.1 MG/DL — SIGNIFICANT CHANGE UP (ref 2.5–4.5)
POTASSIUM SERPL-MCNC: 3.9 MMOL/L — SIGNIFICANT CHANGE UP (ref 3.5–5.3)
POTASSIUM SERPL-SCNC: 3.9 MMOL/L — SIGNIFICANT CHANGE UP (ref 3.5–5.3)
SODIUM SERPL-SCNC: 137 MMOL/L — SIGNIFICANT CHANGE UP (ref 135–145)
SPECIMEN SOURCE: SIGNIFICANT CHANGE UP
SPECIMEN SOURCE: SIGNIFICANT CHANGE UP
TSH SERPL-MCNC: 9.58 UIU/ML — HIGH (ref 0.27–4.2)
VANCOMYCIN TROUGH SERPL-MCNC: 4.2 UG/ML — LOW (ref 10–20)

## 2017-09-21 PROCEDURE — 99232 SBSQ HOSP IP/OBS MODERATE 35: CPT

## 2017-09-21 PROCEDURE — 99233 SBSQ HOSP IP/OBS HIGH 50: CPT

## 2017-09-21 RX ADMIN — ENTACAPONE 200 MILLIGRAM(S): 200 TABLET, FILM COATED ORAL at 17:56

## 2017-09-21 RX ADMIN — ENTACAPONE 200 MILLIGRAM(S): 200 TABLET, FILM COATED ORAL at 19:49

## 2017-09-21 RX ADMIN — PRAMIPEXOLE DIHYDROCHLORIDE 1 MILLIGRAM(S): 0.12 TABLET ORAL at 17:56

## 2017-09-21 RX ADMIN — ENTACAPONE 200 MILLIGRAM(S): 200 TABLET, FILM COATED ORAL at 08:15

## 2017-09-21 RX ADMIN — PRAMIPEXOLE DIHYDROCHLORIDE 1.5 MILLIGRAM(S): 0.12 TABLET ORAL at 11:05

## 2017-09-21 RX ADMIN — Medication 250 MILLIGRAM(S): at 05:59

## 2017-09-21 RX ADMIN — Medication 250 MILLIGRAM(S): at 20:12

## 2017-09-21 RX ADMIN — HEPARIN SODIUM 5000 UNIT(S): 5000 INJECTION INTRAVENOUS; SUBCUTANEOUS at 22:06

## 2017-09-21 RX ADMIN — CARBIDOPA AND LEVODOPA 1 TABLET(S): 25; 100 TABLET ORAL at 08:15

## 2017-09-21 RX ADMIN — CARBIDOPA AND LEVODOPA 1 TABLET(S): 25; 100 TABLET ORAL at 15:01

## 2017-09-21 RX ADMIN — HEPARIN SODIUM 5000 UNIT(S): 5000 INJECTION INTRAVENOUS; SUBCUTANEOUS at 06:00

## 2017-09-21 RX ADMIN — PRAMIPEXOLE DIHYDROCHLORIDE 1.5 MILLIGRAM(S): 0.12 TABLET ORAL at 08:15

## 2017-09-21 RX ADMIN — ENTACAPONE 200 MILLIGRAM(S): 200 TABLET, FILM COATED ORAL at 11:06

## 2017-09-21 RX ADMIN — CARBIDOPA AND LEVODOPA 1 TABLET(S): 25; 100 TABLET ORAL at 19:49

## 2017-09-21 RX ADMIN — HEPARIN SODIUM 5000 UNIT(S): 5000 INJECTION INTRAVENOUS; SUBCUTANEOUS at 15:01

## 2017-09-21 RX ADMIN — CARBIDOPA AND LEVODOPA 1 TABLET(S): 25; 100 TABLET ORAL at 11:07

## 2017-09-21 RX ADMIN — ESCITALOPRAM OXALATE 5 MILLIGRAM(S): 10 TABLET, FILM COATED ORAL at 11:05

## 2017-09-21 RX ADMIN — CARBIDOPA AND LEVODOPA 1 TABLET(S): 25; 100 TABLET ORAL at 17:56

## 2017-09-21 RX ADMIN — ENTACAPONE 200 MILLIGRAM(S): 200 TABLET, FILM COATED ORAL at 15:01

## 2017-09-21 RX ADMIN — CARBIDOPA AND LEVODOPA 1 TABLET(S): 25; 100 TABLET ORAL at 22:06

## 2017-09-21 NOTE — PROGRESS NOTE ADULT - ASSESSMENT
Patient is a 37 year old man with history of Parkinson disease and recently diagnosed osteomyelitis of R 3rd digit treated with IV Nafcillin now presented with recurrent fever and night sweats. Patient is a 37 year old man with history of Parkinson disease and recently diagnosed osteomyelitis of R 3rd digit treated with IV Nafcillin, then switched to Cefazolin with subsequent rash now presented with recurrent fever and night sweats.

## 2017-09-21 NOTE — PROGRESS NOTE ADULT - SUBJECTIVE AND OBJECTIVE BOX
Follow Up:  Rash, Night Sweats    Interval History/ROS: No chills or fevers overnight. No new rash. No N/V/D. No night sweats    Allergies  cefazolin (Flushing; Rash)        ANTIMICROBIALS:  vancomycin  IVPB 1000 every 12 hours      OTHER MEDS:  MEDICATIONS  (STANDING):  influenza   Vaccine 0.5 once  heparin  Injectable 5000 every 8 hours  carbidopa/levodopa  25/100 1 five times a day  carbidopa/levodopa CR 25/100 1 daily  escitalopram 5 daily  entacapone 200 five times a day  pramipexole 1.5 two times a day  pramipexole 1 daily      Vital Signs Last 24 Hrs  T(C): 36.7 (21 Sep 2017 05:19), Max: 36.8 (20 Sep 2017 21:09)  T(F): 98.1 (21 Sep 2017 05:19), Max: 98.2 (20 Sep 2017 21:09)  HR: 72 (21 Sep 2017 05:19) (72 - 92)  BP: 101/64 (21 Sep 2017 05:19) (101/64 - 117/71)  BP(mean): --  RR: 18 (21 Sep 2017 05:19) (18 - 18)  SpO2: 100% (21 Sep 2017 05:19) (100% - 100%)    PHYSICAL EXAMINATION:  General: AAO3X, NAD  HEENT: PERRL, EOMI, No subconjunctival hemorrhages, Oropharynx Clear, MMM  Neck: Supple, No MICHEAL  Cardiac: RRR, No M/R/G  Resp: CTAB, No Wh/Rh/Ra  Abdomen: NBS, NT/ND, No HSM, No rigidity or guarding  MSK: third finger on RUE without cellulitic changes or draining wound. missing toenail on third digit on LLE, no wound or drainage. No LE edema. No stigmata of IE. No evidence of phlebitis. No evidence of synovitis.  Back: No CVA Tenderness  Skin: Faint macular rash on the back. Skin is warm and dry to the touch.   Neuro: AAO3X. CN 2-12 Grossly intact. Moves all four extremities spontaneously.  Psych: Flat affect, Calm, Pleasant, Cooperative                                13.1   4.61  )-----------( 285      ( 20 Sep 2017 03:15 )             41.4       09-    137  |  102  |  10  ----------------------------<  103<H>  3.9   |  21<L>  |  0.70    Ca    8.5      21 Sep 2017 06:30  Phos  3.1       Mg     2.1         TPro  7.2  /  Alb  4.3  /  TBili  0.6  /  DBili  x   /  AST  21  /  ALT  7   /  AlkPhos  65        Urinalysis Basic - ( 20 Sep 2017 17:30 )    Color: YELLOW / Appearance: CLEAR / S.014 / pH: 7.0  Gluc: NEGATIVE / Ketone: NEGATIVE  / Bili: NEGATIVE / Urobili: NORMAL E.U.   Blood: NEGATIVE / Protein: NEGATIVE / Nitrite: NEGATIVE   Leuk Esterase: NEGATIVE / RBC: 2-5 / WBC 0-2   Sq Epi: OCC / Non Sq Epi: x / Bacteria: FEW          MICROBIOLOGY:    Blood Cultures (Peripheral) - / - 2/2 - NGTD    RADIOLOGY:    EXAM:  RAD CHEST PORTABLE ROUTINE    PROCEDURE DATE:  Sep 20 2017   There are no focal lung opacities.

## 2017-09-21 NOTE — PROGRESS NOTE ADULT - ATTENDING COMMENTS
36yo M w/ PD was on naficillin for recently diagnosed R 3rd finger OM, was switched to cefazolin by his outpatient ID doctor the day of admission due to patient complaints of night sweats w/ nafcillin, however pt s/p drug rash reaction to the cefazolin, here for further evaluation     Suspect symptoms were 2/2 meds as patient now asymptomatic on vanc and w/ further clarification he noted symptoms not only at night but about 5-20min after receiving nafcillin.  Bld cxs NTD    C/w vancomycin through 10/4   f/u vanc trough  Low suspicion for endocarditis but will F/u TTE   ID input appreciated   Pt will need Outpatient allergy eval for skin testing to pcn/ cephalosporin.      D/c planning for tomorrow. Will f/u SW/CM to arrange for PICC and abx home care management.

## 2017-09-21 NOTE — PROGRESS NOTE ADULT - PROBLEM SELECTOR PLAN 4
- DVT prophylaxis: Heparin sub Q    Muriel Alves PGY-1  [facility] Medicine Team 2  Pager [phone]. Spectra [phone]

## 2017-09-21 NOTE — PROGRESS NOTE ADULT - ATTENDING COMMENTS
37 year old with MSSA bacteremia due to finger abscess with associated OM.    Night sweats resolved.       He had a drug rash to cefazolin    Okay top d/c from ID perspective in TTE is without gross abnormalities    Continue vanco 1 gm iv q 12 through 10/4 with weekly vanco through, cbc, bmp fax to [phone]    Outpatient allergy eval for skin testing to pcn/ cephalosporin.

## 2017-09-21 NOTE — PROGRESS NOTE ADULT - ASSESSMENT
37 year old male with PMH PD and recent treatment for OM of R 3rd finger s/p PICC and on IV Nafcillin who presented to the ED due to drug rash. Suspect that his night sweats may be related to him mounting a reaction to Nafcillin which ultimately erupted in drug rash prior to presentation. Lower suspicion for TB as patient has clear CXR and was [place]. Continue Vancomycin with continued outpatient followup. Followup TTE  --Continue Vancomycin 1g IV Q12H. Trough prior to fourth dose  --F/U TTE  --F/U Blood Cultures 9/20/2017

## 2017-09-21 NOTE — PROGRESS NOTE ADULT - SUBJECTIVE AND OBJECTIVE BOX
CHIEF COMPLAINT: Patient is a 37 year old man with history of Parkinson and recently admission for osteomyelitis of R 3rd finger s/p PICC on IV Nafcillin presented with recurrent night sweats and fever and developed drug reaction after Cefazolin.     Interval Events: No acute event overnight. Patient is seen and examined at the bedside this morning.     REVIEW OF SYSTEMS:  Constitutional: [ ] negative [x ] fevers [ x] chills [ ] 10 lb weight loss in the past month [ ] weight gain  	HEENT: [x ] negative [ ] dry eyes [ ] eye irritation [ ] postnasal drip [ ] nasal congestion  	CV: [x ] negative  [  chest pain [ ] orthopnea [ ] palpitations [ ] murmur  	Resp: [ ] negative [ x] dry cough [s ] shortness of breath on exertion [ ] dyspnea [ ] wheezing [ ] sputum [ ] hemoptysis  	GI: [x ] negative [ ] nausea [ ] vomiting [ ] diarrhea [ ] constipation [ ] abd pain [ ] dysphagia   	: [x ] negative [ ] dysuria [ ] nocturia [ ] hematuria [ ] increased urinary frequency  	Musculoskeletal: [ x] negative [ ] back pain [ ] myalgias [ ] arthralgias [ ] fracture  	Skin: [x ] negative [ ] rash [ ] itch  	Neurological: [x ] negative [ ] headache [ ] dizziness [ ] syncope [ ] weakness [ ] numbness  	Psychiatric: [x ] negative [ ] anxiety [ ] depression  	Endocrine: [ x] negative [ ] diabetes [ ] thyroid problem  	Hematologic/Lymphatic: [x ] negative [ ] anemia [ ] bleeding problem  	Allergic/Immunologic: [x ] negative [ ] itchy eyes [ ] nasal discharge [ ] hives [ ] angioedema  	[ ] All other systems negative  [ ] Unable to assess ROS because ________    OBJECTIVE:  Vital Signs Last 24 Hrs  T(C): 36.7 (21 Sep 2017 05:19), Max: 37.1 (20 Sep 2017 08:18)  T(F): 98.1 (21 Sep 2017 05:19), Max: 98.8 (20 Sep 2017 08:18)  HR: 72 (21 Sep 2017 05:19) (72 - 92)  BP: 101/64 (21 Sep 2017 05:19) (101/64 - 128/67)  BP(mean): --  RR: 18 (21 Sep 2017 05:19) (18 - 18)  SpO2: 100% (21 Sep 2017 05:19) (100% - 100%)    CAPILLARY BLOOD GLUCOSE          PHYSICAL EXAM:  General: Alert and cooperative. Not in acute stress.   	Head: Normocephalic, no mass and lesions.   	Eyes: PERRLA, clear conjunctiva. EOMI, no ptosis.   	Throat: Oral cavity and pharynx normal. No inflammation, swelling, exudate, or lesions. Teeth and gingiva in good general condition.  	Neck: No lymphadenopathy, no masses, thyromegaly but no nodules appreciated. Carotid pulses 2+. No JVD.   	Respiratory: Bilateral lung clear to auscultation, no crackles, no wheezes, no rhonchi.   	Cardiovascular: S1/S2 auscultated, no murmur, or gallop. Rhythm is regular. There is no peripheral edema.  	Abdomen: Soft, non-tender, nondistended, no guarding or rebound tenderness. Active bowel sounds in all 4 quadrants.   	Extremities: No significant deformity or joint abnormality. Peripheral pulses intact. No varicosities. No peripheral edema, atrophy.   	Skin: Intact, excoriation at the upper back. R third finger was noted to have scarring, no erythema, non tender.   Neurological: AOAx4. CN2-12 grosslly intact. Strength and sensation symmetric and intact throughout. Reflexes 2+ throughout. Cogwheel rigidity, minimal resting tremor.    HOSPITAL MEDICATIONS:  heparin  Injectable 5000 Unit(s) SubCutaneous every 8 hours    vancomycin  IVPB 1000 milliGRAM(s) IV Intermittent every 12 hours          carbidopa/levodopa  25/100 1 Tablet(s) Oral five times a day  carbidopa/levodopa CR 25/100 1 Tablet(s) Oral daily  escitalopram 5 milliGRAM(s) Oral daily  entacapone 200 milliGRAM(s) Oral five times a day  pramipexole 1.5 milliGRAM(s) Oral two times a day  pramipexole 1 milliGRAM(s) Oral daily            influenza   Vaccine 0.5 milliLiter(s) IntraMuscular once          LABS:                        13.1   4.61  )-----------( 285      ( 20 Sep 2017 03:15 )             41.4     Hgb Trend: 13.1<--  09-20    140  |  102  |  17  ----------------------------<  104<H>  4.3   |  25  |  1.12    Ca    9.1      20 Sep 2017 03:15  Phos  3.3       Mg     2.2         TPro  7.2  /  Alb  4.3  /  TBili  0.6  /  DBili  x   /  AST  21  /  ALT  7   /  AlkPhos  65      Creatinine Trend: 1.12<--, 0.79<--, 0.77<--, 0.85<--, 0.90<--    Urinalysis Basic - ( 20 Sep 2017 17:30 )    Color: YELLOW / Appearance: CLEAR / S.014 / pH: 7.0  Gluc: NEGATIVE / Ketone: NEGATIVE  / Bili: NEGATIVE / Urobili: NORMAL E.U.   Blood: NEGATIVE / Protein: NEGATIVE / Nitrite: NEGATIVE   Leuk Esterase: NEGATIVE / RBC: 2-5 / WBC 0-2   Sq Epi: OCC / Non Sq Epi: x / Bacteria: FEW          RADIOLOGY:  MRI Upper extremities   1.  Skin defect at the ulnar third digit with soft tissue swelling in keeping with synovitis.  2.  Phlegmonous change along the ulnar aspect of the third digit. Limited evaluation for abscess without IV contrast. No definite drainable fluid collection.  3.  Abnormal marrow signal within the third middle and distal phalanges in keeping with osteomyelitis. CHIEF COMPLAINT: Patient is a 37 year old man with history of Parkinson and recently admission for osteomyelitis of R 3rd finger s/p PICC on IV Nafcillin presented with recurrent night sweats and fever and developed drug reaction after Cefazolin.     Interval Events: No acute event overnight. Patient is seen and examined at the bedside this morning. He has no more fever or chills, no more dry cough, no night sweats.     REVIEW OF SYSTEMS:  Constitutional: [x ] negative [ ] fevers [ ] chills [ ] 10 lb weight loss in the past month [ ] weight gain  	HEENT: [x ] negative [ ] dry eyes [ ] eye irritation [ ] postnasal drip [ ] nasal congestion  	CV: [x ] negative  [  chest pain [ ] orthopnea [ ] palpitations [ ] murmur  	Resp: [ ] negative [ x] dry cough [s ] shortness of breath on exertion [ ] dyspnea [ ] wheezing [ ] sputum [ ] hemoptysis  	GI: [x ] negative [ ] nausea [ ] vomiting [ ] diarrhea [ ] constipation [ ] abd pain [ ] dysphagia   	: [x ] negative [ ] dysuria [ ] nocturia [ ] hematuria [ ] increased urinary frequency  	Musculoskeletal: [ x] negative [ ] back pain [ ] myalgias [ ] arthralgias [ ] fracture  	Skin: [x ] negative [ ] rash [ ] itch  	Neurological: [x ] negative [ ] headache [ ] dizziness [ ] syncope [ ] weakness [ ] numbness  	Psychiatric: [x ] negative [ ] anxiety [ ] depression  	Endocrine: [ x] negative [ ] diabetes [ ] thyroid problem  	Hematologic/Lymphatic: [x ] negative [ ] anemia [ ] bleeding problem  	Allergic/Immunologic: [x ] negative [ ] itchy eyes [ ] nasal discharge [ ] hives [ ] angioedema  	[ ] All other systems negative  [ ] Unable to assess ROS because ________    OBJECTIVE:  Vital Signs Last 24 Hrs  T(C): 36.7 (21 Sep 2017 05:19), Max: 37.1 (20 Sep 2017 08:18)  T(F): 98.1 (21 Sep 2017 05:19), Max: 98.8 (20 Sep 2017 08:18)  HR: 72 (21 Sep 2017 05:19) (72 - 92)  BP: 101/64 (21 Sep 2017 05:19) (101/64 - 128/67)  BP(mean): --  RR: 18 (21 Sep 2017 05:19) (18 - 18)  SpO2: 100% (21 Sep 2017 05:19) (100% - 100%)    CAPILLARY BLOOD GLUCOSE          PHYSICAL EXAM:  General: Alert and cooperative. Not in acute stress.   	Head: Normocephalic, no mass and lesions.   	Eyes: PERRLA, clear conjunctiva. EOMI, no ptosis.   	Throat: Oral cavity and pharynx normal. No inflammation, swelling, exudate, or lesions. Teeth and gingiva in good general condition.  	Neck: No lymphadenopathy, no masses, thyromegaly but no nodules appreciated. Carotid pulses 2+. No JVD.   	Respiratory: Bilateral lung clear to auscultation, no crackles, no wheezes, no rhonchi.   	Cardiovascular: S1/S2 auscultated, no murmur, or gallop. Rhythm is regular. There is no peripheral edema.  	Abdomen: Soft, non-tender, nondistended, no guarding or rebound tenderness. Active bowel sounds in all 4 quadrants.   	Extremities: No significant deformity or joint abnormality. Peripheral pulses intact. No varicosities. No peripheral edema, atrophy.   	Skin: Intact, excoriation at the upper back. R third finger was noted to have scarring, no erythema, non tender.   Neurological: AOAx4. CN2-12 grosslly intact. Strength and sensation symmetric and intact throughout. Reflexes 2+ throughout. Cogwheel rigidity, minimal resting tremor.    HOSPITAL MEDICATIONS:  heparin  Injectable 5000 Unit(s) SubCutaneous every 8 hours    vancomycin  IVPB 1000 milliGRAM(s) IV Intermittent every 12 hours          carbidopa/levodopa  25/100 1 Tablet(s) Oral five times a day  carbidopa/levodopa CR 25/100 1 Tablet(s) Oral daily  escitalopram 5 milliGRAM(s) Oral daily  entacapone 200 milliGRAM(s) Oral five times a day  pramipexole 1.5 milliGRAM(s) Oral two times a day  pramipexole 1 milliGRAM(s) Oral daily            influenza   Vaccine 0.5 milliLiter(s) IntraMuscular once          LABS:                        13.1   4.61  )-----------( 285      ( 20 Sep 2017 03:15 )             41.4     Hgb Trend: 13.1<--  09-20    140  |  102  |  17  ----------------------------<  104<H>  4.3   |  25  |  1.12    Ca    9.1      20 Sep 2017 03:15  Phos  3.3       Mg     2.2         TPro  7.2  /  Alb  4.3  /  TBili  0.6  /  DBili  x   /  AST  21  /  ALT  7   /  AlkPhos  65      Creatinine Trend: 1.12<--, 0.79<--, 0.77<--, 0.85<--, 0.90<--    Urinalysis Basic - ( 20 Sep 2017 17:30 )    Color: YELLOW / Appearance: CLEAR / S.014 / pH: 7.0  Gluc: NEGATIVE / Ketone: NEGATIVE  / Bili: NEGATIVE / Urobili: NORMAL E.U.   Blood: NEGATIVE / Protein: NEGATIVE / Nitrite: NEGATIVE   Leuk Esterase: NEGATIVE / RBC: 2-5 / WBC 0-2   Sq Epi: OCC / Non Sq Epi: x / Bacteria: FEW          RADIOLOGY:  MRI Upper extremities   1.  Skin defect at the ulnar third digit with soft tissue swelling in keeping with synovitis.  2.  Phlegmonous change along the ulnar aspect of the third digit. Limited evaluation for abscess without IV contrast. No definite drainable fluid collection.  3.  Abnormal marrow signal within the third middle and distal phalanges in keeping with osteomyelitis. CHIEF COMPLAINT: Recurrent fever and night sweats     Interval Events: No acute event overnight. Patient is seen and examined at the bedside this morning. He has no more fever or chills, no more dry cough, no night sweats.     REVIEW OF SYSTEMS:  Constitutional: [x ] negative [ ] fevers [ ] chills [ ] 10 lb weight loss in the past month [ ] weight gain  	HEENT: [x ] negative [ ] dry eyes [ ] eye irritation [ ] postnasal drip [ ] nasal congestion  	CV: [x ] negative  [  chest pain [ ] orthopnea [ ] palpitations [ ] murmur  	Resp: [ ] negative [ x] dry cough [s ] shortness of breath on exertion [ ] dyspnea [ ] wheezing [ ] sputum [ ] hemoptysis  	GI: [x ] negative [ ] nausea [ ] vomiting [ ] diarrhea [ ] constipation [ ] abd pain [ ] dysphagia   	: [x ] negative [ ] dysuria [ ] nocturia [ ] hematuria [ ] increased urinary frequency  	Musculoskeletal: [ x] negative [ ] back pain [ ] myalgias [ ] arthralgias [ ] fracture  	Skin: [x ] negative [ ] rash [ ] itch  	Neurological: [x ] negative [ ] headache [ ] dizziness [ ] syncope [ ] weakness [ ] numbness  	Psychiatric: [x ] negative [ ] anxiety [ ] depression  	Endocrine: [ x] negative [ ] diabetes [ ] thyroid problem  	Hematologic/Lymphatic: [x ] negative [ ] anemia [ ] bleeding problem  	Allergic/Immunologic: [x ] negative [ ] itchy eyes [ ] nasal discharge [ ] hives [ ] angioedema  	[ ] All other systems negative  [ ] Unable to assess ROS because ________    OBJECTIVE:  Vital Signs Last 24 Hrs  T(C): 36.7 (21 Sep 2017 05:19), Max: 37.1 (20 Sep 2017 08:18)  T(F): 98.1 (21 Sep 2017 05:19), Max: 98.8 (20 Sep 2017 08:18)  HR: 72 (21 Sep 2017 05:19) (72 - 92)  BP: 101/64 (21 Sep 2017 05:19) (101/64 - 128/67)  BP(mean): --  RR: 18 (21 Sep 2017 05:19) (18 - 18)  SpO2: 100% (21 Sep 2017 05:19) (100% - 100%)    CAPILLARY BLOOD GLUCOSE          PHYSICAL EXAM:  General: Alert and cooperative. Not in acute stress.   	Head: Normocephalic, no mass and lesions.   	Eyes: PERRLA, clear conjunctiva. EOMI, no ptosis.   	Throat: Oral cavity and pharynx normal. No inflammation, swelling, exudate, or lesions. Teeth and gingiva in good general condition.  	Neck: No lymphadenopathy, no masses, thyromegaly but no nodules appreciated. Carotid pulses 2+. No JVD.   	Respiratory: Bilateral lung clear to auscultation, no crackles, no wheezes, no rhonchi.   	Cardiovascular: S1/S2 auscultated, no murmur, or gallop. Rhythm is regular. There is no peripheral edema.  	Abdomen: Soft, non-tender, nondistended, no guarding or rebound tenderness. Active bowel sounds in all 4 quadrants.   	Extremities: No significant deformity or joint abnormality. Peripheral pulses intact. No varicosities. No peripheral edema, atrophy.   	Skin: Intact, excoriation at the upper back. R third finger was noted to have scarring, no erythema, non tender.   Neurological: AOAx4. CN2-12 grosslly intact. Strength and sensation symmetric and intact throughout. Reflexes 2+ throughout. Cogwheel rigidity, minimal resting tremor.    HOSPITAL MEDICATIONS:  heparin  Injectable 5000 Unit(s) SubCutaneous every 8 hours    vancomycin  IVPB 1000 milliGRAM(s) IV Intermittent every 12 hours          carbidopa/levodopa  25/100 1 Tablet(s) Oral five times a day  carbidopa/levodopa CR 25/100 1 Tablet(s) Oral daily  escitalopram 5 milliGRAM(s) Oral daily  entacapone 200 milliGRAM(s) Oral five times a day  pramipexole 1.5 milliGRAM(s) Oral two times a day  pramipexole 1 milliGRAM(s) Oral daily            influenza   Vaccine 0.5 milliLiter(s) IntraMuscular once          LABS:                        13.1   4.61  )-----------( 285      ( 20 Sep 2017 03:15 )             41.4     Hgb Trend: 13.1<--      140  |  102  |  17  ----------------------------<  104<H>  4.3   |  25  |  1.12    Ca    9.1      20 Sep 2017 03:15  Phos  3.3       Mg     2.2         TPro  7.2  /  Alb  4.3  /  TBili  0.6  /  DBili  x   /  AST  21  /  ALT  7   /  AlkPhos  65      Creatinine Trend: 1.12<--, 0.79<--, 0.77<--, 0.85<--, 0.90<--    Urinalysis Basic - ( 20 Sep 2017 17:30 )    Color: YELLOW / Appearance: CLEAR / S.014 / pH: 7.0  Gluc: NEGATIVE / Ketone: NEGATIVE  / Bili: NEGATIVE / Urobili: NORMAL E.U.   Blood: NEGATIVE / Protein: NEGATIVE / Nitrite: NEGATIVE   Leuk Esterase: NEGATIVE / RBC: 2-5 / WBC 0-2   Sq Epi: OCC / Non Sq Epi: x / Bacteria: FEW          RADIOLOGY:  MRI Upper extremities   1.  Skin defect at the ulnar third digit with soft tissue swelling in keeping with synovitis.  2.  Phlegmonous change along the ulnar aspect of the third digit. Limited evaluation for abscess without IV contrast. No definite drainable fluid collection.  3.  Abnormal marrow signal within the third middle and distal phalanges in keeping with osteomyelitis.

## 2017-09-21 NOTE — PROGRESS NOTE ADULT - PROBLEM SELECTOR PLAN 1
- Patient was reported to have recurrent fever at home, does not know the actual temperature, Oral temp was 36.6 at ED  - Patient has no GI,  symptoms, low concern for UTI or abdominal infections; also no upper respiratory symptoms, RVP negative; no consolidation noted on CXR, low concern for pneumonia  - Pending urine culture, blood culture 9/20 NGTD - Patient was reported to have recurrent fever at home, does not know the actual temperature, Oral temp was 36.6 at ED  - Patient has no GI,  symptoms, low concern for UTI or abdominal infections; also no upper respiratory symptoms, RVP negative; no consolidation noted on CXR, low concern for pneumonia  - Started patient on Vanc (today is day 2), pending urine culture, blood culture 9/20 NGTD

## 2017-09-21 NOTE — PROGRESS NOTE ADULT - PROBLEM SELECTOR PLAN 2
- Patient has 3 weeks history of night sweats, dry cough and 10 lb weight loss, although he is inconsistent with   - Differential include: osteomyelitis vs TB vs malignancy vs hyperthyroidism  - Osteomyelitis: unlikely as patient currently has no pain, non tender, not erythematous, will follow up blood culture  - Tuberculosis: dry cough, weight loss and night sweat concerning for TB, pending AFB  - Malignancy: patient has no history of smoking, colonoscopy not indicated for his age, no focal lung capacities noted, low suspicion for malignancy  - Hyperthyroidism: clinically euthyroid, will check TSH/T4 - Patient has 3 weeks history of night sweats, dry cough and 10 lb weight loss, although he is inconsistent with his story, states that he would get night sweat after receiving Nafcillin  - Differential include: osteomyelitis vs TB vs malignancy vs hyperthyroidism  - Osteomyelitis: unlikely as patient currently has no pain, non tender, not erythematous, will follow up blood culture  - Tuberculosis: dry cough, weight loss due to decreased appetite and night sweat, no recent travel outside of the country, low concerning for TB  - Malignancy: patient has no history of smoking, colonoscopy not indicated for his age, no focal lung capacities noted, low suspicion for malignancy  - Hyperthyroidism: clinically euthyroid, will check TSH/T4  - Drug reaction to penicillin: as patient gets night sweat after receiving nafcillin  - Plan to continue with Vancomycin (today is day 2) - Patient has 3 weeks history of night sweats, dry cough and 10 lb weight loss due to decrease appetitie; patient is inconsistent with his story, states that he would get night sweat after receiving Nafcillin  - Differential include: osteomyelitis vs TB vs drug reaction  - Osteomyelitis: unlikely as patient currently has no pain, non tender, not erythematous, will follow up blood culture  - Tuberculosis: dry cough, weight loss due to decreased appetite and night sweat, no recent travel outside of the country, low concerning for TB  - Drug reaction to penicillin: as patient gets night sweat after receiving nafcillin and developed profuse flushing after cefazolin  - Plan to continue with Vancomycin (today is day 2), will check van trough pre4th dose - Patient has 3 weeks history of night sweats, dry cough and 10 lb weight loss due to decrease appetitie; patient is inconsistent with his story, states that he would get night sweat after receiving Nafcillin  - Differential include: osteomyelitis vs TB vs drug reaction  - Osteomyelitis: unlikely as patient currently has no pain, non tender, not erythematous, will follow up blood culture  - Tuberculosis: dry cough, weight loss due to decreased appetite and night sweat, no recent travel outside of the country, low concerning for TB  - Drug reaction to penicillin: as patient gets night sweat after receiving nafcillin and developed profuse flushing after cefazolin  - Plan to continue with Vancomycin (today is day 2), will check vanc trough pre4th dose  - TTE today Patient has 3 weeks history of night sweats, dry cough and 10 lb weight loss due to decrease appetitie; patient is inconsistent with his story, states that he would get not only at night but after receiving Nafcillin. Differential include: osteomyelitis vs TB vs drug reaction. With medication induced highest on the ddx.    - Osteomyelitis: unlikely as patient currently has no pain, non tender, not erythematous bld cx NTD   - Tuberculosis: dry cough, weight loss due to decreased appetite and night sweat, no recent travel outside of the country, low concerning for TB. F/u quant gold  - Drug reaction to penicillin: as patient gets sweats after receiving nafcillin and developed profuse flushing after cefazolin. Now asymptomatic on vanc   - Plan to continue with Vancomycin (today is day 2), will check vanc trough  - TTE today

## 2017-09-22 ENCOUNTER — TRANSCRIPTION ENCOUNTER (OUTPATIENT)
Age: 37
End: 2017-09-22

## 2017-09-22 VITALS
TEMPERATURE: 98 F | OXYGEN SATURATION: 99 % | RESPIRATION RATE: 19 BRPM | HEART RATE: 84 BPM | SYSTOLIC BLOOD PRESSURE: 114 MMHG | DIASTOLIC BLOOD PRESSURE: 62 MMHG

## 2017-09-22 LAB
T3 SERPL-MCNC: 123 NG/DL — SIGNIFICANT CHANGE UP (ref 80–200)
T4 FREE SERPL-MCNC: 0.98 NG/DL — SIGNIFICANT CHANGE UP (ref 0.9–1.8)
VANCOMYCIN TROUGH SERPL-MCNC: 4.4 UG/ML — LOW (ref 10–20)

## 2017-09-22 PROCEDURE — 99239 HOSP IP/OBS DSCHRG MGMT >30: CPT

## 2017-09-22 PROCEDURE — 93306 TTE W/DOPPLER COMPLETE: CPT | Mod: 26

## 2017-09-22 RX ORDER — VANCOMYCIN HCL 1 G
1.25 VIAL (EA) INTRAVENOUS
Qty: 16.25 | Refills: 0 | OUTPATIENT
Start: 2017-09-22 | End: 2017-10-05

## 2017-09-22 RX ORDER — VANCOMYCIN HCL 1 G
1250 VIAL (EA) INTRAVENOUS ONCE
Qty: 0 | Refills: 0 | Status: COMPLETED | OUTPATIENT
Start: 2017-09-22 | End: 2017-09-22

## 2017-09-22 RX ORDER — VANCOMYCIN HCL 1 G
VIAL (EA) INTRAVENOUS
Qty: 0 | Refills: 0 | Status: DISCONTINUED | OUTPATIENT
Start: 2017-09-22 | End: 2017-09-22

## 2017-09-22 RX ORDER — TRIHEXYPHENIDYL HCL 2 MG
1 TABLET ORAL
Qty: 0 | Refills: 0 | COMMUNITY

## 2017-09-22 RX ORDER — VANCOMYCIN HCL 1 G
1250 VIAL (EA) INTRAVENOUS EVERY 12 HOURS
Qty: 0 | Refills: 0 | Status: DISCONTINUED | OUTPATIENT
Start: 2017-09-22 | End: 2017-09-22

## 2017-09-22 RX ORDER — ESCITALOPRAM OXALATE 10 MG/1
1 TABLET, FILM COATED ORAL
Qty: 0 | Refills: 0 | COMMUNITY
Start: 2017-09-22

## 2017-09-22 RX ORDER — ALTEPLASE 100 MG
2 KIT INTRAVENOUS ONCE
Qty: 0 | Refills: 0 | Status: DISCONTINUED | OUTPATIENT
Start: 2017-09-22 | End: 2017-09-22

## 2017-09-22 RX ADMIN — ENTACAPONE 200 MILLIGRAM(S): 200 TABLET, FILM COATED ORAL at 14:44

## 2017-09-22 RX ADMIN — Medication 166.67 MILLIGRAM(S): at 10:47

## 2017-09-22 RX ADMIN — ENTACAPONE 200 MILLIGRAM(S): 200 TABLET, FILM COATED ORAL at 10:51

## 2017-09-22 RX ADMIN — ENTACAPONE 200 MILLIGRAM(S): 200 TABLET, FILM COATED ORAL at 08:05

## 2017-09-22 RX ADMIN — ESCITALOPRAM OXALATE 5 MILLIGRAM(S): 10 TABLET, FILM COATED ORAL at 14:45

## 2017-09-22 RX ADMIN — CARBIDOPA AND LEVODOPA 1 TABLET(S): 25; 100 TABLET ORAL at 14:43

## 2017-09-22 RX ADMIN — HEPARIN SODIUM 5000 UNIT(S): 5000 INJECTION INTRAVENOUS; SUBCUTANEOUS at 14:44

## 2017-09-22 RX ADMIN — HEPARIN SODIUM 5000 UNIT(S): 5000 INJECTION INTRAVENOUS; SUBCUTANEOUS at 06:33

## 2017-09-22 RX ADMIN — CARBIDOPA AND LEVODOPA 1 TABLET(S): 25; 100 TABLET ORAL at 10:51

## 2017-09-22 RX ADMIN — PRAMIPEXOLE DIHYDROCHLORIDE 1 MILLIGRAM(S): 0.12 TABLET ORAL at 16:52

## 2017-09-22 RX ADMIN — PRAMIPEXOLE DIHYDROCHLORIDE 1.5 MILLIGRAM(S): 0.12 TABLET ORAL at 08:05

## 2017-09-22 RX ADMIN — CARBIDOPA AND LEVODOPA 1 TABLET(S): 25; 100 TABLET ORAL at 08:05

## 2017-09-22 RX ADMIN — ENTACAPONE 200 MILLIGRAM(S): 200 TABLET, FILM COATED ORAL at 16:52

## 2017-09-22 RX ADMIN — PRAMIPEXOLE DIHYDROCHLORIDE 1.5 MILLIGRAM(S): 0.12 TABLET ORAL at 10:51

## 2017-09-22 RX ADMIN — CARBIDOPA AND LEVODOPA 1 TABLET(S): 25; 100 TABLET ORAL at 16:52

## 2017-09-22 RX ADMIN — Medication 300 MILLIGRAM(S): at 16:52

## 2017-09-22 NOTE — PROGRESS NOTE ADULT - PROBLEM SELECTOR PLAN 3
- Resume home medications Sinemet, Pramipexole, trihexyphenidyl
- Resume home medications Sinemet, Pramipexole, trihexyphenidyl

## 2017-09-22 NOTE — PROGRESS NOTE ADULT - ASSESSMENT
Patient is a 37 year old man with history of Parkinson disease and recently diagnosed osteomyelitis of R 3rd digit treated with IV Nafcillin, then switched to Cefazolin with subsequent rash now presented with recurrent fever and night sweats most likely secondary to drug reaction.

## 2017-09-22 NOTE — CHART NOTE - NSCHARTNOTEFT_GEN_A_CORE
R3 Chart Update    PICC Removal:  Procedure explained to patient and wife at bedside. Patient placed in Trendelenburg position. Sutures removed with suture removal kit. Guaze placed over PICC site with gentle pressure. Patient instructed to hold his breath and entire PICC removed. Patient tolerated the procedure well without any pain or discomfort. Pressure held > 10 minutes.  PICC site dressed with gauze and tape.     Jaclyn Magallanes  [id number]  Pager: [phone]

## 2017-09-22 NOTE — DISCHARGE NOTE ADULT - PLAN OF CARE
Avoid nafcillin You were admitted to the hospital for recurrent night sweats and fevers most likely due to drug reaction to nafcillin. Please continue with Vancomycin for a total of 14 days (last day on Oct 4) and avoid penicillin in the future. You were admitted to the hospital for recurrent night sweats and fevers most likely due to drug reaction to nafcillin. You received Vancomycin during your hospital stay, but will switch to Clindamycin when you are discharged. Please continue taking your clindamycin until you see your infectious disease doctor in 1 week. Please continue to take your home medications as prescribed. Please follow-up with your neurologist for further management.

## 2017-09-22 NOTE — PROGRESS NOTE ADULT - PROBLEM SELECTOR PLAN 2
- Patient has 3 weeks history of night sweats, dry cough and 10 lb weight loss due to decrease appetite; patient is inconsistent with his story, states that he would get not only at night but after receiving Nafcillin. Differential include: osteomyelitis vs TB vs drug reaction. With medication induced highest on the ddx.    - Osteomyelitis: unlikely as patient currently has no pain, non tender, not erythematous blood cx NTD   - Tuberculosis: dry cough, weight loss due to decreased appetite and night sweat, no recent travel outside of the country, low concerning for TB. F/u quant gold  - Drug reaction to penicillin: as patient gets sweats after receiving nafcillin and developed profuse flushing after cefazolin. Now asymptomatic on vanc   - Plan to continue with Vancomycin (today is day 3) for a total of 14 days (last day is 10/4), will check vanc trough  - TTE today - Patient has 3 weeks history of night sweats, dry cough and 10 lb weight loss due to decrease appetite; patient is inconsistent with his story, states that he would get not only at night but after receiving Nafcillin. Differential include: osteomyelitis vs TB vs drug reaction. With medication induced highest on the ddx.    - Osteomyelitis: unlikely as patient currently has no pain, non tender, not erythematous blood cx NTD   - Tuberculosis: dry cough, weight loss due to decreased appetite and night sweat, no recent travel outside of the country, low concerning for TB. F/u quant gold  - Drug reaction to penicillin: as patient gets sweats after receiving nafcillin and developed profuse flushing after cefazolin. Now asymptomatic on vanc   - TTE today  - Plan to discontinue IV Abx, discontinue PICC, and start Clindamycin Now resolved.   - Patient has 3 weeks history of night sweats, dry cough and 10 lb weight loss due to decrease appetite; patient is inconsistent with his story, states that he would get not only at night but after receiving Nafcillin. Differential include: osteomyelitis vs TB vs drug reaction. With medication induced highest on the ddx.    - Osteomyelitis: unlikely as patient currently has no pain, non tender, not erythematous blood cx NTD   - Tuberculosis: dry cough, weight loss due to decreased appetite and night sweat, no recent travel outside of the country, low concerning for TB. F/u quant gold  - Drug reaction to penicillin: as patient gets sweats after receiving nafcillin and developed profuse flushing after cefazolin. Now asymptomatic on vanc   - TTE today

## 2017-09-22 NOTE — PROGRESS NOTE ADULT - PROBLEM SELECTOR PLAN 1
- Patient was reported to have recurrent fever at home, does not know the actual temperature, Oral temp was 36.6 at ED  - Patient has no GI,  symptoms, low concern for UTI or abdominal infections; also no upper respiratory symptoms, RVP negative; no consolidation noted on CXR, low concern for pneumonia  - Started patient on Vanc (today is day 3), blood culture 9/20 NGTD - Patient was reported to have recurrent fever at home, does not know the actual temperature, Oral temp was 36.6 at ED  - Patient has no GI,  symptoms, low concern for UTI or abdominal infections; also no upper respiratory symptoms, RVP negative; no consolidation noted on CXR, low concern for pneumonia  - Started patient on Vanc (today is day 3), blood culture 9/20 NGTD  - Spoke with patient's private ID doctor, she wanted the patient to stop IV Abx and switch to oral Clindamycin as patient have already completed 26 days of IV Abx. - Patient was reported to have recurrent fever at home, does not know the actual temperature, Oral temp was 36.6 at ED  - Patient has no GI,  symptoms, low concern for UTI or abdominal infections; also no upper respiratory symptoms, RVP negative; no consolidation noted on CXR, low concern for pneumonia  - Started patient on Vanc (today is day 3), blood culture 9/20 NGTD  - Spoke with patient's private ID doctor, she wants the patient to stop IV Abx and switch to oral Clindamycin as patient have already completed 26 days of IV Abx.  - Plan to discontinue IV Abx, discontinue PICC, and start Clindamycin

## 2017-09-22 NOTE — DISCHARGE NOTE ADULT - MEDICATION SUMMARY - MEDICATIONS TO STOP TAKING
I will STOP taking the medications listed below when I get home from the hospital:    trihexyphenidyl 2 mg oral tablet  -- 1 tab(s) by mouth 2 times a day

## 2017-09-22 NOTE — DISCHARGE NOTE ADULT - HOSPITAL COURSE
Patient is a 37 year old man with history of Parkinson and recent admission for osteomyelitis Patient is a 37 year old man with history of Parkinson and recent admission for osteomyelitis of R third digit presented with recurrent night sweat and subjective fevers and chills. Patient first presented to the ED on August 25 with R 3rd digit swelling and pain that resulted in an abscess due to trauma and was initially started on Bactrim and took only once dose and returned to the ED on August 27 with worsening pain at the finger. He was also found to have gram positive frederick/cocci bacteremia and was admitted and was initially started on Vancomycin and Zosyn. MRI was done on 8/31 and shows abnormal marrow signal and soft tissue swelling concerning for osteomyelitis. Patient was discharged with PICC line for 4 weeks of IV Nafcillin for MSSA.  Throughout the course of the Abx, patient developed recurring night sweats and subjective fever and his private infectious disease doctor switched Nafcillin to Cefazolin, which he developed a systemic erythematous rash. In the ED, temperature oral 36.6. . Bp 148/74. RR 17 saturating at 99% on room air.     Hospital course:  Patient's night sweat and subjective fever in addition to his weight loss were initially thought to be due to TB, but patient does not have recent travel outside of country and his weight loss is likely related to his decrease in appetite. Primary team initially could not get hold of private infectious disease doctor, house ID team was consulted and recommended starting patient on vancomycin as patient's constellation of symptoms are considered to be likely due to drug reaction. Patient was started on vancomycin 1g Q12H, his vancomycin trough level was checked and was 4.3, plan to increase to vancomycin to 1250 mg Q12H, but got in contact with Dr. Marina. his private ID doctor and she wanted to stop IV Abx and switch to clindamycin instead. Throughout the hospital stay, patient did not have fever, no reported night sweat and no chills. Patient has been on his home regimen of parkinson medications including Sinemet, Entacapone, and Pramipexole.     Patient is in stable condition to be discharged to home. He understands that he will continue to take clindamycin and follow up with Dr. Marina in 1 week. Patient is a 37 year old man with history of Parkinson and recent admission for osteomyelitis of R third digit presented with recurrent night sweat and subjective fevers and chills. Patient first presented to the ED on August 25 with R 3rd digit swelling and pain that resulted in an abscess due to trauma and was initially started on Bactrim and took only once dose and returned to the ED on August 27 with worsening pain at the finger. He was also found to have gram positive frederick/cocci bacteremia and was admitted and was initially started on Vancomycin and Zosyn. MRI was done on 8/31 and shows abnormal marrow signal and soft tissue swelling concerning for osteomyelitis. Patient was discharged with PICC line for 4 weeks of IV Nafcillin for MSSA.  Throughout the course of the Abx, patient developed recurring night sweats and subjective fever and his private infectious disease doctor switched Nafcillin to Cefazolin, which he developed a systemic erythematous rash. In the ED, temperature oral 36.6. . Bp 148/74. RR 17 saturating at 99% on room air.     Hospital course:  Patient's night sweat and subjective fever in addition to his weight loss were initially thought to be due to TB, but patient does not have recent travel outside of country and his weight loss is likely related to his decrease in appetite. Primary team initially could not get hold of private infectious disease doctor, house ID team was consulted and recommended starting patient on vancomycin as patient's constellation of symptoms are considered to be likely due to drug reaction. Patient was started on vancomycin per ID recommendations. Case discussed with Dr. Marina. patient's private ID doctor who recommended d/c PICC line and IV antibiotics as patient has already received a total of 26 days of IV antibiotics, almost a 4 week course.  Dr. Marina recommended switching to PO clindamycin to complete course of antibiotics. Throughout the hospital stay, patient did not have fever, no reported night sweat and no chills. Patient has been on his home regimen of parkinson medications including Sinemet, Entacapone, and Pramipexole.     Patient is in stable condition to be discharged to home. He understands that he will continue to take clindamycin and follow up with Dr. Marina during his scheduled appointment on October 3rd. Patient is a 37 year old man with history of Parkinson and recent admission for osteomyelitis of R third digit presented with recurrent night sweat and subjective fevers and chills. Patient first presented to the ED on August 25 with R 3rd digit swelling and pain that resulted in an abscess due to trauma and was initially started on Bactrim and took only once dose and returned to the ED on August 27 with worsening pain at the finger. He was also found to have gram positive frederick/cocci bacteremia and was admitted and was initially started on Vancomycin and Zosyn. MRI was done on 8/31 and shows abnormal marrow signal and soft tissue swelling concerning for osteomyelitis. Patient was discharged with PICC line for 4 weeks of IV Nafcillin for MSSA.  Throughout the course of the Abx, patient developed recurring night sweats and subjective fever and his private infectious disease doctor switched Nafcillin to Cefazolin, which he developed a systemic erythematous rash. In the ED, temperature oral 36.6. . Bp 148/74. RR 17 saturating at 99% on room air.     Hospital course:  Patient remained afebrile through out the course. Infectious w/u was unremarkable. Blood cx was neg.  TTE was unremarkable. Initially some concern for that Patient's night sweat and subjective fever in addition to his weight loss could be related to TB, given pts report that he lives in a house w/ individuals that have had TB in the past and was treated for it. However, he does not have recent travel outside of country and his weight loss is likely related to his reported decrease in appetite. Quant gold was obtained and was neg.  Primary team initially could not get hold of private infectious disease doctor, house ID team was consulted and recommended starting patient on vancomycin as patient's constellation of symptoms were  likely due to drug reaction (on further clarification of hx pt reported symptoms were associated w/ each dose of abx and not only night sweats). Patient was started on vancomycin per ID recommendations. Case discussed with Dr. Marina. patient's private ID doctor who recommended d/c PICC line and IV antibiotics as patient has already received a total of 26 days of IV antibiotics, almost a 4 week course.  Dr. Marina recommended switching to PO clindamycin to complete course of antibiotics. Throughout the hospital stay, patient did not have fever, no reported night sweat and no chills. Patient has been on his home regimen of parkinson medications including Sinemet, Entacapone, and Pramipexole.     Patient is in stable condition to be discharged to home. He understands that he will continue to take clindamycin and follow up with Dr. Marina during his scheduled appointment on October 3rd.

## 2017-09-22 NOTE — PROGRESS NOTE ADULT - PROBLEM SELECTOR PLAN 4
- DVT prophylaxis: Heparin sub Q    Dispo: discharge to home after TTE and van trough     Muriel Alves PGY-1  [facility] Medicine Team 2  Pager [phone]. Spectra [id number] - DVT prophylaxis: Heparin sub Q    Dispo: discharge to home after TTE    Muriel Alves PGY-1  [facility] Medicine Team 2  Pager [phone]. Spectra [phone]

## 2017-09-22 NOTE — DISCHARGE NOTE ADULT - PROVIDER TOKENS
FREE:[LAST:[Salas],FIRST:[Maria T],PHONE:[phone]],FAX:[(   [phone]    -],ADDRESS:[[address] [place] [address]]]

## 2017-09-22 NOTE — DISCHARGE NOTE ADULT - MEDICATION SUMMARY - MEDICATIONS TO TAKE
I will START or STAY ON the medications listed below when I get home from the hospital:    escitalopram 5 mg oral tablet  -- 1 tab(s) by mouth once a day  -- Indication: For Parkinson disease    pramipexole 1.5 mg oral tablet  -- 1 tab(s) by mouth 3 times a day  -- Indication: For Parkinson disease    entacapone 200 mg oral tablet  -- 1 tab(s) by mouth 4 times a day  -- Indication: For Parkinson disease    carbidopa-levodopa 25 mg-100 mg oral tablet  -- 1.5 tab(s) by mouth 4 times a day  -- Indication: For Parkinson disease    clindamycin 150 mg oral capsule  -- 3 cap(s) by mouth 4 times a day  -- Indication: For Adverse effect of drug I will START or STAY ON the medications listed below when I get home from the hospital:    escitalopram 5 mg oral tablet  -- 1 tab(s) by mouth once a day  -- Indication: For Parkinson disease    pramipexole 1.5 mg oral tablet  -- 1 tab(s) by mouth 3 times a day  -- Indication: For Parkinson disease    entacapone 200 mg oral tablet  -- 1 tab(s) by mouth 4 times a day  -- Indication: For Parkinson disease    carbidopa-levodopa 25 mg-100 mg oral tablet  -- 1.5 tab(s) by mouth 4 times a day  -- Indication: For Parkinson disease    clindamycin 300 mg oral capsule  -- 1 cap(s) by mouth every 8 hours  -- Indication: For Osteomyelitis

## 2017-09-22 NOTE — PROGRESS NOTE ADULT - SUBJECTIVE AND OBJECTIVE BOX
CHIEF COMPLAINT: Recurrent fever and night sweats     Interval Events: No acute event overnight. Patient is seen and examined at the bedside this morning. He has no more fever or chills, no more dry cough, no night sweats.     REVIEW OF SYSTEMS:  Constitutional: [x ] negative [ ] fevers [ ] chills [ ] 10 lb weight loss in the past month [ ] weight gain  	HEENT: [x ] negative [ ] dry eyes [ ] eye irritation [ ] postnasal drip [ ] nasal congestion  	CV: [x ] negative  [  chest pain [ ] orthopnea [ ] palpitations [ ] murmur  	Resp: [ ] negative [ x] dry cough [s ] shortness of breath on exertion [ ] dyspnea [ ] wheezing [ ] sputum [ ] hemoptysis  	GI: [x ] negative [ ] nausea [ ] vomiting [ ] diarrhea [ ] constipation [ ] abd pain [ ] dysphagia   	: [x ] negative [ ] dysuria [ ] nocturia [ ] hematuria [ ] increased urinary frequency  	Musculoskeletal: [ x] negative [ ] back pain [ ] myalgias [ ] arthralgias [ ] fracture  	Skin: [x ] negative [ ] rash [ ] itch  	Neurological: [x ] negative [ ] headache [ ] dizziness [ ] syncope [ ] weakness [ ] numbness  	Psychiatric: [x ] negative [ ] anxiety [ ] depression  	Endocrine: [ x] negative [ ] diabetes [ ] thyroid problem  	Hematologic/Lymphatic: [x ] negative [ ] anemia [ ] bleeding problem  	Allergic/Immunologic: [x ] negative [ ] itchy eyes [ ] nasal discharge [ ] hives [ ] angioedema  	[ ] All other systems negative  [ ] Unable to assess ROS because ________    OBJECTIVE:  Vital Signs Last 24 Hrs  T(C): 36.7 (21 Sep 2017 05:19), Max: 37.1 (20 Sep 2017 08:18)  T(F): 98.1 (21 Sep 2017 05:19), Max: 98.8 (20 Sep 2017 08:18)  HR: 72 (21 Sep 2017 05:19) (72 - 92)  BP: 101/64 (21 Sep 2017 05:19) (101/64 - 128/67)  BP(mean): --  RR: 18 (21 Sep 2017 05:19) (18 - 18)  SpO2: 100% (21 Sep 2017 05:19) (100% - 100%)    CAPILLARY BLOOD GLUCOSE          PHYSICAL EXAM:  General: Alert and cooperative. Not in acute stress.   	Head: Normocephalic, no mass and lesions.   	Eyes: PERRLA, clear conjunctiva. EOMI, no ptosis.   	Throat: Oral cavity and pharynx normal. No inflammation, swelling, exudate, or lesions. Teeth and gingiva in good general condition.  	Neck: No lymphadenopathy, no masses, thyromegaly but no nodules appreciated. Carotid pulses 2+. No JVD.   	Respiratory: Bilateral lung clear to auscultation, no crackles, no wheezes, no rhonchi.   	Cardiovascular: S1/S2 auscultated, no murmur, or gallop. Rhythm is regular. There is no peripheral edema.  	Abdomen: Soft, non-tender, nondistended, no guarding or rebound tenderness. Active bowel sounds in all 4 quadrants.   	Extremities: No significant deformity or joint abnormality. Peripheral pulses intact. No varicosities. No peripheral edema, atrophy.   	Skin: Intact, excoriation at the upper back. R third finger was noted to have scarring, no erythema, non tender.   Neurological: AOAx4. CN2-12 grosslly intact. Strength and sensation symmetric and intact throughout. Reflexes 2+ throughout. Cogwheel rigidity, minimal resting tremor.    HOSPITAL MEDICATIONS:  heparin  Injectable 5000 Unit(s) SubCutaneous every 8 hours    vancomycin  IVPB 1000 milliGRAM(s) IV Intermittent every 12 hours          carbidopa/levodopa  25/100 1 Tablet(s) Oral five times a day  carbidopa/levodopa CR 25/100 1 Tablet(s) Oral daily  escitalopram 5 milliGRAM(s) Oral daily  entacapone 200 milliGRAM(s) Oral five times a day  pramipexole 1.5 milliGRAM(s) Oral two times a day  pramipexole 1 milliGRAM(s) Oral daily            influenza   Vaccine 0.5 milliLiter(s) IntraMuscular once          LABS:                        13.1   4.61  )-----------( 285      ( 20 Sep 2017 03:15 )             41.4     Hgb Trend: 13.1<--      140  |  102  |  17  ----------------------------<  104<H>  4.3   |  25  |  1.12    Ca    9.1      20 Sep 2017 03:15  Phos  3.3       Mg     2.2         TPro  7.2  /  Alb  4.3  /  TBili  0.6  /  DBili  x   /  AST  21  /  ALT  7   /  AlkPhos  65      Creatinine Trend: 1.12<--, 0.79<--, 0.77<--, 0.85<--, 0.90<--    Urinalysis Basic - ( 20 Sep 2017 17:30 )    Color: YELLOW / Appearance: CLEAR / S.014 / pH: 7.0  Gluc: NEGATIVE / Ketone: NEGATIVE  / Bili: NEGATIVE / Urobili: NORMAL E.U.   Blood: NEGATIVE / Protein: NEGATIVE / Nitrite: NEGATIVE   Leuk Esterase: NEGATIVE / RBC: 2-5 / WBC 0-2   Sq Epi: OCC / Non Sq Epi: x / Bacteria: FEW          RADIOLOGY:  MRI Upper extremities   1.  Skin defect at the ulnar third digit with soft tissue swelling in keeping with synovitis.  2.  Phlegmonous change along the ulnar aspect of the third digit. Limited evaluation for abscess without IV contrast. No definite drainable fluid collection.  3.  Abnormal marrow signal within the third middle and distal phalanges in keeping with osteomyelitis. CHIEF COMPLAINT: Recurrent fever and night sweats     Interval Events: No acute event overnight. Patient is seen and examined at the bedside this morning. He has no more fever or chills, no more dry cough, no night sweats.     REVIEW OF SYSTEMS:  Constitutional: [x ] negative [ ] fevers [ ] chills [ ] 10 lb weight loss in the past month [ ] weight gain  	HEENT: [x ] negative [ ] dry eyes [ ] eye irritation [ ] postnasal drip [ ] nasal congestion  	CV: [x ] negative  [  chest pain [ ] orthopnea [ ] palpitations [ ] murmur  	Resp: [ ] negative [ x] dry cough [ x ] shortness of breath on exertion [ ] dyspnea [ ] wheezing [ ] sputum [ ] hemoptysis  	GI: [x ] negative [ ] nausea [ ] vomiting [ ] diarrhea [ ] constipation [ ] abd pain [ ] dysphagia   	: [x ] negative [ ] dysuria [ ] nocturia [ ] hematuria [ ] increased urinary frequency  	Musculoskeletal: [ x] negative [ ] back pain [ ] myalgias [ ] arthralgias [ ] fracture  	Skin: [x ] negative [ ] rash [ ] itch  	Neurological: [x ] negative [ ] headache [ ] dizziness [ ] syncope [ ] weakness [ ] numbness  	Psychiatric: [x ] negative [ ] anxiety [ ] depression  	Endocrine: [ x] negative [ ] diabetes [ ] thyroid problem  	Hematologic/Lymphatic: [x ] negative [ ] anemia [ ] bleeding problem  	Allergic/Immunologic: [x ] negative [ ] itchy eyes [ ] nasal discharge [ ] hives [ ] angioedema  	[ ] All other systems negative  [ ] Unable to assess ROS because ________    OBJECTIVE:  Vital Signs Last 24 Hrs  T(C): 36.7 (21 Sep 2017 05:19), Max: 37.1 (20 Sep 2017 08:18)  T(F): 98.1 (21 Sep 2017 05:19), Max: 98.8 (20 Sep 2017 08:18)  HR: 72 (21 Sep 2017 05:19) (72 - 92)  BP: 101/64 (21 Sep 2017 05:19) (101/64 - 128/67)  BP(mean): --  RR: 18 (21 Sep 2017 05:19) (18 - 18)  SpO2: 100% (21 Sep 2017 05:19) (100% - 100%)    CAPILLARY BLOOD GLUCOSE          PHYSICAL EXAM:  General: Alert and cooperative. Not in acute stress.   	Head: Normocephalic, no mass and lesions.   	Eyes: PERRLA, clear conjunctiva. EOMI, no ptosis.   	Throat: Oral cavity and pharynx normal. No inflammation, swelling, exudate, or lesions. Teeth and gingiva in good general condition.  	Neck: No lymphadenopathy, no masses, thyromegaly but no nodules appreciated. Carotid pulses 2+. No JVD.   	Respiratory: Bilateral lung clear to auscultation, no crackles, no wheezes, no rhonchi.   	Cardiovascular: S1/S2 auscultated, no murmur, or gallop. Rhythm is regular. There is no peripheral edema.  	Abdomen: Soft, non-tender, nondistended, no guarding or rebound tenderness. Active bowel sounds in all 4 quadrants.   	Extremities: No significant deformity or joint abnormality. Peripheral pulses intact. No varicosities. No peripheral edema, atrophy.   	Skin: Intact, excoriation at the upper back. R third finger was noted to have scarring, no erythema, non tender.   Neurological: AOAx4. CN2-12 grosslly intact. Strength and sensation symmetric and intact throughout. Reflexes 2+ throughout. Cogwheel rigidity, minimal resting tremor.    HOSPITAL MEDICATIONS:  heparin  Injectable 5000 Unit(s) SubCutaneous every 8 hours    vancomycin  IVPB 1000 milliGRAM(s) IV Intermittent every 12 hours          carbidopa/levodopa  25/100 1 Tablet(s) Oral five times a day  carbidopa/levodopa CR 25/100 1 Tablet(s) Oral daily  escitalopram 5 milliGRAM(s) Oral daily  entacapone 200 milliGRAM(s) Oral five times a day  pramipexole 1.5 milliGRAM(s) Oral two times a day  pramipexole 1 milliGRAM(s) Oral daily            influenza   Vaccine 0.5 milliLiter(s) IntraMuscular once          LABS:                        13.1   4.61  )-----------( 285      ( 20 Sep 2017 03:15 )             41.4     Hgb Trend: 13.1<--      140  |  102  |  17  ----------------------------<  104<H>  4.3   |  25  |  1.12    Ca    9.1      20 Sep 2017 03:15  Phos  3.3       Mg     2.2         TPro  7.2  /  Alb  4.3  /  TBili  0.6  /  DBili  x   /  AST  21  /  ALT  7   /  AlkPhos  65      Creatinine Trend: 1.12<--, 0.79<--, 0.77<--, 0.85<--, 0.90<--    Urinalysis Basic - ( 20 Sep 2017 17:30 )    Color: YELLOW / Appearance: CLEAR / S.014 / pH: 7.0  Gluc: NEGATIVE / Ketone: NEGATIVE  / Bili: NEGATIVE / Urobili: NORMAL E.U.   Blood: NEGATIVE / Protein: NEGATIVE / Nitrite: NEGATIVE   Leuk Esterase: NEGATIVE / RBC: 2-5 / WBC 0-2   Sq Epi: OCC / Non Sq Epi: x / Bacteria: FEW          RADIOLOGY:  MRI Upper extremities   1.  Skin defect at the ulnar third digit with soft tissue swelling in keeping with synovitis.  2.  Phlegmonous change along the ulnar aspect of the third digit. Limited evaluation for abscess without IV contrast. No definite drainable fluid collection.  3.  Abnormal marrow signal within the third middle and distal phalanges in keeping with osteomyelitis.

## 2017-09-22 NOTE — DISCHARGE NOTE ADULT - PATIENT PORTAL LINK FT
“You can access the [employer] Patient Portal, offered by [facility], by registering with the following website: [email or web address]”

## 2017-09-22 NOTE — DISCHARGE NOTE ADULT - MEDICATION SUMMARY - MEDICATIONS TO CHANGE
I will SWITCH the dose or number of times a day I take the medications listed below when I get home from the hospital:  None I will SWITCH the dose or number of times a day I take the medications listed below when I get home from the hospital:    clindamycin 150 mg oral capsule  -- 3 cap(s) by mouth 4 times a day

## 2017-09-22 NOTE — DISCHARGE NOTE ADULT - CARE PLAN
Principal Discharge DX:	Adverse effect of drug, initial encounter  Goal:	Avoid nafcillin  Instructions for follow-up, activity and diet:	You were admitted to the hospital for recurrent night sweats and fevers most likely due to drug reaction to nafcillin. Please continue with Vancomycin for a total of 14 days (last day on Oct 4) and avoid penicillin in the future.  Secondary Diagnosis:	Parkinson disease Principal Discharge DX:	Adverse effect of drug, initial encounter  Goal:	Avoid nafcillin  Instructions for follow-up, activity and diet:	You were admitted to the hospital for recurrent night sweats and fevers most likely due to drug reaction to nafcillin. You received Vancomycin during your hospital stay, but will switch to Clindamycin when you are discharged. Please continue taking your clindamycin until you see your infectious disease doctor in 1 week.  Secondary Diagnosis:	Parkinson disease Principal Discharge DX:	Adverse effect of drug, initial encounter  Goal:	Avoid nafcillin  Instructions for follow-up, activity and diet:	You were admitted to the hospital for recurrent night sweats and fevers most likely due to drug reaction to nafcillin. You received Vancomycin during your hospital stay, but will switch to Clindamycin when you are discharged. Please continue taking your clindamycin until you see your infectious disease doctor in 1 week.  Secondary Diagnosis:	Parkinson disease  Instructions for follow-up, activity and diet:	Please continue to take your home medications as prescribed. Please follow-up with your neurologist for further management.

## 2017-09-22 NOTE — PROGRESS NOTE ADULT - ATTENDING COMMENTS
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     Suspect symptoms were 2/2 meds as patient now asymptomatic on vanc and w/ further clarification he noted symptoms not only at night but about 5-20min after receiving nafcillin.  Bld cxs NTD    Per discussion w/ patients out pt ID doctor. Pt already s/p 26 days of IV antibiotics and would like to transition patient to PO clindamycin with close follow up after discharge.     d/c vancomycin and PICC line. Will start clindamycin. Reviewed sensitivity profile from 9/29 and cx was sensitive to bactrim.   Low suspicion for endocarditis but will F/u TTE today  Pt will need Outpatient allergy eval for skin testing to pcn/ cephalosporin.      D/c planning for today. Spent 35 min on discharge coordination and planning. 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     Suspect symptoms were 2/2 meds as patient now asymptomatic on vanc and w/ further clarification he noted symptoms not only at night but about 5-20min after receiving nafcillin.  Bld cxs NTD    Per discussion w/ patients out pt ID doctor. Pt already s/p 26 days of IV antibiotics and would like to transition patient to PO clindamycin with close follow up after discharge.     d/c vancomycin and PICC line. Will start clindamycin. Reviewed sensitivity profile from 9/29 and cx was sensitive to bactrim.   Low suspicion for endocarditis but will F/u TTE today  Pt will need Outpatient allergy eval for skin testing to pcn/ cephalosporin.      D/c planning for today if TTE unremarkable. Spent 35 min on discharge coordination and planning.

## 2017-09-23 LAB
BACTERIA UR CULT: SIGNIFICANT CHANGE UP
M TB TUBERC IFN-G BLD QL: 0.02 IU/ML — SIGNIFICANT CHANGE UP
M TB TUBERC IFN-G BLD QL: 0.12 IU/ML — SIGNIFICANT CHANGE UP
M TB TUBERC IFN-G BLD QL: NEGATIVE — SIGNIFICANT CHANGE UP
MITOGEN IGNF BCKGRD COR BLD-ACNC: >10 IU/ML — SIGNIFICANT CHANGE UP
SPECIMEN SOURCE: SIGNIFICANT CHANGE UP

## 2017-09-25 LAB
BACTERIA BLD CULT: SIGNIFICANT CHANGE UP
BACTERIA BLD CULT: SIGNIFICANT CHANGE UP

## 2017-10-12 ENCOUNTER — EMERGENCY (EMERGENCY)
Facility: HOSPITAL | Age: 37
LOS: 0 days | Discharge: HOME | End: 2017-10-12
Attending: EMERGENCY MEDICINE
Payer: MEDICARE

## 2017-10-12 VITALS
HEIGHT: 67 IN | HEART RATE: 90 BPM | OXYGEN SATURATION: 98 % | DIASTOLIC BLOOD PRESSURE: 78 MMHG | WEIGHT: 149.91 LBS | SYSTOLIC BLOOD PRESSURE: 124 MMHG | RESPIRATION RATE: 16 BRPM | TEMPERATURE: 99 F

## 2017-10-12 VITALS
RESPIRATION RATE: 17 BRPM | SYSTOLIC BLOOD PRESSURE: 126 MMHG | TEMPERATURE: 99 F | DIASTOLIC BLOOD PRESSURE: 74 MMHG | OXYGEN SATURATION: 100 % | HEART RATE: 87 BPM

## 2017-10-12 DIAGNOSIS — Z98.890 OTHER SPECIFIED POSTPROCEDURAL STATES: Chronic | ICD-10-CM

## 2017-10-12 LAB
ALBUMIN SERPL ELPH-MCNC: 3.7 G/DL — SIGNIFICANT CHANGE UP (ref 3.3–5)
ALP SERPL-CCNC: 83 U/L — SIGNIFICANT CHANGE UP (ref 40–120)
ALT FLD-CCNC: 14 U/L — SIGNIFICANT CHANGE UP (ref 12–78)
ANION GAP SERPL CALC-SCNC: 5 MMOL/L — SIGNIFICANT CHANGE UP (ref 5–17)
AST SERPL-CCNC: 22 U/L — SIGNIFICANT CHANGE UP (ref 15–37)
BILIRUB SERPL-MCNC: 0.8 MG/DL — SIGNIFICANT CHANGE UP (ref 0.2–1.2)
BUN SERPL-MCNC: 22 MG/DL — SIGNIFICANT CHANGE UP (ref 7–23)
CALCIUM SERPL-MCNC: 8.6 MG/DL — SIGNIFICANT CHANGE UP (ref 8.5–10.1)
CHLORIDE SERPL-SCNC: 110 MMOL/L — HIGH (ref 96–108)
CO2 SERPL-SCNC: 28 MMOL/L — SIGNIFICANT CHANGE UP (ref 22–31)
CREAT SERPL-MCNC: 0.77 MG/DL — SIGNIFICANT CHANGE UP (ref 0.5–1.3)
ERYTHROCYTE [SEDIMENTATION RATE] IN BLOOD: 2 MM/HR — SIGNIFICANT CHANGE UP (ref 0–15)
GLUCOSE SERPL-MCNC: 102 MG/DL — HIGH (ref 70–99)
HCT VFR BLD CALC: 42.9 % — SIGNIFICANT CHANGE UP (ref 39–50)
HGB BLD-MCNC: 13.3 G/DL — SIGNIFICANT CHANGE UP (ref 13–17)
LACTATE SERPL-SCNC: 1.5 MMOL/L — SIGNIFICANT CHANGE UP (ref 0.7–2)
MCHC RBC-ENTMCNC: 29.6 PG — SIGNIFICANT CHANGE UP (ref 27–34)
MCHC RBC-ENTMCNC: 31.1 GM/DL — LOW (ref 32–36)
MCV RBC AUTO: 95.3 FL — SIGNIFICANT CHANGE UP (ref 80–100)
PLATELET # BLD AUTO: 273 K/UL — SIGNIFICANT CHANGE UP (ref 150–400)
POTASSIUM SERPL-MCNC: 4.1 MMOL/L — SIGNIFICANT CHANGE UP (ref 3.5–5.3)
POTASSIUM SERPL-SCNC: 4.1 MMOL/L — SIGNIFICANT CHANGE UP (ref 3.5–5.3)
PROT SERPL-MCNC: 7.1 GM/DL — SIGNIFICANT CHANGE UP (ref 6–8.3)
RBC # BLD: 4.5 M/UL — SIGNIFICANT CHANGE UP (ref 4.2–5.8)
RBC # FLD: 15.7 % — HIGH (ref 11–15)
SODIUM SERPL-SCNC: 143 MMOL/L — SIGNIFICANT CHANGE UP (ref 135–145)
WBC # BLD: 6.1 K/UL — SIGNIFICANT CHANGE UP (ref 3.8–10.5)
WBC # FLD AUTO: 6.1 K/UL — SIGNIFICANT CHANGE UP (ref 3.8–10.5)

## 2017-10-12 PROCEDURE — 99284 EMERGENCY DEPT VISIT MOD MDM: CPT

## 2017-10-12 NOTE — ED PROVIDER NOTE - MEDICAL DECISION MAKING DETAILS
36 yo M with generalized weakness likely 2/2 heavy antibx use  -basic labs, esr, ekg  -f/u results, if normal d/c to pcp, neuro, and heme/onc

## 2017-10-12 NOTE — ED ADULT NURSE NOTE - CHIEF COMPLAINT QUOTE
pt c/o generalize weakness for a few days with vomiting and diarrhea. Pt denies any abdominal pain. Pt states he has hx of Parkinson and recent "bone infection"

## 2017-10-12 NOTE — ED ADULT TRIAGE NOTE - CHIEF COMPLAINT QUOTE
pt c/o generalize weakness for a few days with vomiting and diarrhea. Pt denies any abdominal pain. Pt states he has hx of Parkinson and recent "bone infection" yes

## 2017-10-12 NOTE — ED PROVIDER NOTE - OBJECTIVE STATEMENT
36 yo M with hx parkinson's and osteo of R middle finger, now comes in with generalized weakness.  Pt. blames recent antibx therapy, both PICC IV antbx and oral, which finished 1 week ago.  His neurologist and ID doc both said antbx will take him down a bit and make him loose energy.  Pt. wanted to get checked out. No other complaints.   ROS: negative for fever, cough, headache, chest pain, shortness of breath, abd pain, nausea, vomiting, diarrhea, rash, and paresthesia.  PMH: parkinson's, osteomyelitis; Meds: Denies; SH: Denies smoking/drinking/drug use

## 2017-10-12 NOTE — ED PROVIDER NOTE - PHYSICAL EXAMINATION
Vitals: WNL  Gen: AAOx3, NAD, sitting comfortably in stretcher, stuttering speech, mild generalized tremor  Head: ncat, perrla, eomi b/l  Neck: supple, no lymphadenopathy, no midline deviation  Heart: rrr, no m/r/g  Lungs: CTA b/l, no rales/ronchi/wheezes  Abd: soft, nontender, non-distended, no rebound or guarding  Ext: no clubbing/cyanosis/edema  Neuro: sensation and muscle strength intact b/l, steady gait

## 2017-10-12 NOTE — ED ADULT NURSE NOTE - OBJECTIVE STATEMENT
PT c/o of generalized weakness that has gotten worst for the past 2 days. Pt has hx of Parkinson dx. Also pt reported that he completed abx course for osteomyelitis about 3 weeks ago,

## 2017-10-13 DIAGNOSIS — R53.1 WEAKNESS: ICD-10-CM

## 2017-10-13 DIAGNOSIS — M19.041 PRIMARY OSTEOARTHRITIS, RIGHT HAND: ICD-10-CM

## 2017-10-13 DIAGNOSIS — Z88.9 ALLERGY STATUS TO UNSPECIFIED DRUGS, MEDICAMENTS AND BIOLOGICAL SUBSTANCES: ICD-10-CM

## 2017-10-13 DIAGNOSIS — G20 PARKINSON'S DISEASE: ICD-10-CM

## 2017-10-13 LAB — CRP SERPL-MCNC: 0.4 MG/DL — SIGNIFICANT CHANGE UP (ref 0–0.4)

## 2017-10-28 ENCOUNTER — EMERGENCY (EMERGENCY)
Facility: HOSPITAL | Age: 37
LOS: 1 days | Discharge: HOME | End: 2017-10-28
Attending: EMERGENCY MEDICINE | Admitting: EMERGENCY MEDICINE
Payer: MEDICARE

## 2017-10-28 VITALS
DIASTOLIC BLOOD PRESSURE: 70 MMHG | RESPIRATION RATE: 18 BRPM | OXYGEN SATURATION: 99 % | HEART RATE: 80 BPM | TEMPERATURE: 98 F | SYSTOLIC BLOOD PRESSURE: 130 MMHG

## 2017-10-28 DIAGNOSIS — Z98.890 OTHER SPECIFIED POSTPROCEDURAL STATES: Chronic | ICD-10-CM

## 2017-10-28 PROCEDURE — 99284 EMERGENCY DEPT VISIT MOD MDM: CPT | Mod: 25,GC

## 2017-10-28 NOTE — ED ADULT TRIAGE NOTE - CHIEF COMPLAINT QUOTE
Patient with a h/o Parkinson's Disease c/o confusion, weakness, diarrhea since yesterday. C/o body shaking on his right side since this morning.

## 2017-10-29 VITALS
TEMPERATURE: 99 F | OXYGEN SATURATION: 100 % | DIASTOLIC BLOOD PRESSURE: 64 MMHG | SYSTOLIC BLOOD PRESSURE: 126 MMHG | RESPIRATION RATE: 16 BRPM | HEART RATE: 78 BPM

## 2017-10-29 LAB
ALBUMIN SERPL ELPH-MCNC: 4.3 G/DL — SIGNIFICANT CHANGE UP (ref 3.3–5)
ALP SERPL-CCNC: 81 U/L — SIGNIFICANT CHANGE UP (ref 40–120)
ALT FLD-CCNC: 14 U/L — SIGNIFICANT CHANGE UP (ref 4–41)
APPEARANCE UR: CLEAR — SIGNIFICANT CHANGE UP
AST SERPL-CCNC: 19 U/L — SIGNIFICANT CHANGE UP (ref 4–40)
BASE EXCESS BLDV CALC-SCNC: 3.6 MMOL/L — SIGNIFICANT CHANGE UP
BASOPHILS # BLD AUTO: 0.07 K/UL — SIGNIFICANT CHANGE UP (ref 0–0.2)
BASOPHILS NFR BLD AUTO: 1 % — SIGNIFICANT CHANGE UP (ref 0–2)
BILIRUB SERPL-MCNC: 0.2 MG/DL — SIGNIFICANT CHANGE UP (ref 0.2–1.2)
BILIRUB UR-MCNC: NEGATIVE — SIGNIFICANT CHANGE UP
BLOOD GAS VENOUS - CREATININE: 0.83 MG/DL — SIGNIFICANT CHANGE UP (ref 0.5–1.3)
BLOOD UR QL VISUAL: NEGATIVE — SIGNIFICANT CHANGE UP
BUN SERPL-MCNC: 20 MG/DL — SIGNIFICANT CHANGE UP (ref 7–23)
CALCIUM SERPL-MCNC: 8.6 MG/DL — SIGNIFICANT CHANGE UP (ref 8.4–10.5)
CHLORIDE BLDV-SCNC: 106 MMOL/L — SIGNIFICANT CHANGE UP (ref 96–108)
CHLORIDE SERPL-SCNC: 103 MMOL/L — SIGNIFICANT CHANGE UP (ref 98–107)
CO2 SERPL-SCNC: 28 MMOL/L — SIGNIFICANT CHANGE UP (ref 22–31)
COLOR SPEC: YELLOW — SIGNIFICANT CHANGE UP
CREAT SERPL-MCNC: 1.01 MG/DL — SIGNIFICANT CHANGE UP (ref 0.5–1.3)
EOSINOPHIL # BLD AUTO: 0.52 K/UL — HIGH (ref 0–0.5)
EOSINOPHIL NFR BLD AUTO: 7.5 % — HIGH (ref 0–6)
GAS PNL BLDV: 138 MMOL/L — SIGNIFICANT CHANGE UP (ref 136–146)
GLUCOSE BLDV-MCNC: 121 — HIGH (ref 70–99)
GLUCOSE SERPL-MCNC: 115 MG/DL — HIGH (ref 70–99)
GLUCOSE UR-MCNC: NEGATIVE — SIGNIFICANT CHANGE UP
HCO3 BLDV-SCNC: 25 MMOL/L — SIGNIFICANT CHANGE UP (ref 20–27)
HCT VFR BLD CALC: 45.6 % — SIGNIFICANT CHANGE UP (ref 39–50)
HCT VFR BLDV CALC: 44.6 % — SIGNIFICANT CHANGE UP (ref 39–51)
HGB BLD-MCNC: 14.1 G/DL — SIGNIFICANT CHANGE UP (ref 13–17)
HGB BLDV-MCNC: 14.5 G/DL — SIGNIFICANT CHANGE UP (ref 13–17)
IMM GRANULOCYTES # BLD AUTO: 0.03 # — SIGNIFICANT CHANGE UP
IMM GRANULOCYTES NFR BLD AUTO: 0.4 % — SIGNIFICANT CHANGE UP (ref 0–1.5)
KETONES UR-MCNC: NEGATIVE — SIGNIFICANT CHANGE UP
LACTATE BLDV-MCNC: 1.6 MMOL/L — SIGNIFICANT CHANGE UP (ref 0.5–2)
LEUKOCYTE ESTERASE UR-ACNC: NEGATIVE — SIGNIFICANT CHANGE UP
LYMPHOCYTES # BLD AUTO: 2.29 K/UL — SIGNIFICANT CHANGE UP (ref 1–3.3)
LYMPHOCYTES # BLD AUTO: 32.9 % — SIGNIFICANT CHANGE UP (ref 13–44)
MAGNESIUM SERPL-MCNC: 2 MG/DL — SIGNIFICANT CHANGE UP (ref 1.6–2.6)
MCHC RBC-ENTMCNC: 28.5 PG — SIGNIFICANT CHANGE UP (ref 27–34)
MCHC RBC-ENTMCNC: 30.9 % — LOW (ref 32–36)
MCV RBC AUTO: 92.3 FL — SIGNIFICANT CHANGE UP (ref 80–100)
MONOCYTES # BLD AUTO: 0.51 K/UL — SIGNIFICANT CHANGE UP (ref 0–0.9)
MONOCYTES NFR BLD AUTO: 7.3 % — SIGNIFICANT CHANGE UP (ref 2–14)
MUCOUS THREADS # UR AUTO: SIGNIFICANT CHANGE UP
NEUTROPHILS # BLD AUTO: 3.53 K/UL — SIGNIFICANT CHANGE UP (ref 1.8–7.4)
NEUTROPHILS NFR BLD AUTO: 50.9 % — SIGNIFICANT CHANGE UP (ref 43–77)
NITRITE UR-MCNC: NEGATIVE — SIGNIFICANT CHANGE UP
NRBC # FLD: 0 — SIGNIFICANT CHANGE UP
PCO2 BLDV: 59 MMHG — HIGH (ref 41–51)
PH BLDV: 7.32 PH — SIGNIFICANT CHANGE UP (ref 7.32–7.43)
PH UR: 6 — SIGNIFICANT CHANGE UP (ref 4.6–8)
PHOSPHATE SERPL-MCNC: 3 MG/DL — SIGNIFICANT CHANGE UP (ref 2.5–4.5)
PLATELET # BLD AUTO: 310 K/UL — SIGNIFICANT CHANGE UP (ref 150–400)
PMV BLD: 9.1 FL — SIGNIFICANT CHANGE UP (ref 7–13)
PO2 BLDV: 34 MMHG — LOW (ref 35–40)
POTASSIUM BLDV-SCNC: 4 MMOL/L — SIGNIFICANT CHANGE UP (ref 3.4–4.5)
POTASSIUM SERPL-MCNC: 4.3 MMOL/L — SIGNIFICANT CHANGE UP (ref 3.5–5.3)
POTASSIUM SERPL-SCNC: 4.3 MMOL/L — SIGNIFICANT CHANGE UP (ref 3.5–5.3)
PROT SERPL-MCNC: 6.9 G/DL — SIGNIFICANT CHANGE UP (ref 6–8.3)
PROT UR-MCNC: NEGATIVE — SIGNIFICANT CHANGE UP
RBC # BLD: 4.94 M/UL — SIGNIFICANT CHANGE UP (ref 4.2–5.8)
RBC # FLD: 12.7 % — SIGNIFICANT CHANGE UP (ref 10.3–14.5)
RBC CASTS # UR COMP ASSIST: SIGNIFICANT CHANGE UP (ref 0–?)
SAO2 % BLDV: 59.5 % — LOW (ref 60–85)
SODIUM SERPL-SCNC: 141 MMOL/L — SIGNIFICANT CHANGE UP (ref 135–145)
SP GR SPEC: 1.03 — SIGNIFICANT CHANGE UP (ref 1–1.03)
UROBILINOGEN FLD QL: NORMAL E.U. — SIGNIFICANT CHANGE UP (ref 0.1–0.2)
WBC # BLD: 6.95 K/UL — SIGNIFICANT CHANGE UP (ref 3.8–10.5)
WBC # FLD AUTO: 6.95 K/UL — SIGNIFICANT CHANGE UP (ref 3.8–10.5)
WBC UR QL: SIGNIFICANT CHANGE UP (ref 0–?)

## 2017-10-29 PROCEDURE — 71010: CPT | Mod: 26

## 2017-10-29 RX ORDER — SODIUM CHLORIDE 9 MG/ML
1000 INJECTION INTRAMUSCULAR; INTRAVENOUS; SUBCUTANEOUS ONCE
Qty: 0 | Refills: 0 | Status: COMPLETED | OUTPATIENT
Start: 2017-10-29 | End: 2017-10-29

## 2017-10-29 RX ORDER — PRAMIPEXOLE DIHYDROCHLORIDE 0.12 MG/1
1 TABLET ORAL
Qty: 0 | Refills: 0 | COMMUNITY

## 2017-10-29 RX ORDER — ENTACAPONE 200 MG/1
1 TABLET, FILM COATED ORAL
Qty: 0 | Refills: 0 | COMMUNITY

## 2017-10-29 RX ADMIN — SODIUM CHLORIDE 1000 MILLILITER(S): 9 INJECTION INTRAMUSCULAR; INTRAVENOUS; SUBCUTANEOUS at 02:38

## 2017-10-29 NOTE — ED PROVIDER NOTE - OBJECTIVE STATEMENT
38 yo M with [facility] Parkinson's disease who presents with 2 days of weakness, confusion and worsening R sided tremor. He also reports 2 days of diarrhea, with 5 loose BMs per day, non-bloody. He also reports feeling dehydrated. He has no sick contacts and no recent travel. 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 Per his family, his PD symptoms usually worsen when he takes antibiotics and so they stopped giving him the bactrim. Patient has not seen his PMD for over 3 years, however, he follows regularly with his neurologist Dr. Lovett.

## 2017-10-29 NOTE — ED ADULT NURSE NOTE - OBJECTIVE STATEMENT
Received on stretcher in 22 with visitors at bedside. Awake, A&Ox3, ambulatory without assistance at baseline (with assistance at present d/t weakness), NAD observed, respirations even and unlabored on room air. 36 YO male h/o Parkinson's disease c/o diarrhea and increased right sided tremors. 20G IV access obtained in left AC, labs drawn and sent as ordered. Comfort and safety measure provided. Call bell within reach.

## 2017-10-29 NOTE — ED PROVIDER NOTE - PHYSICAL EXAMINATION
PHYSICAL EXAM:  GENERAL: NAD, well-groomed, well-developed  HEAD:  Atraumatic, Normocephalic  EYES: EOMI, PERRLA, conjunctiva and sclera clear  ENMT: No tonsillar erythema, exudates, or enlargement; Moist mucous membranes, Good dentition, No lesions  NECK: Supple, No JVD, Normal thyroid  CHEST/LUNG: Clear to percussion bilaterally; No rales, rhonchi, wheezing, or rubs  HEART: Regular rate and rhythm; No murmurs, rubs, or gallops  ABDOMEN: Soft, Nontender, Nondistended; Bowel sounds present  VASCULAR:  2+ Peripheral Pulses, No clubbing, cyanosis, or edema  LYMPH: No lymphadenopathy noted  SKIN: No rashes or lesions  NERVOUS SYSTEM:  Alert & Oriented X3, Good concentration; Motor Strength 5/5 B/L upper and lower extremities; DTRs 2+ intact and symmetric PHYSICAL EXAM:  GENERAL: NAD, well-nourished, sitting comfortably in bed, responding to questions appropriately  HEAD:  Atraumatic, Normocephalic  EYES: PERRLA, conjunctiva and sclera clear  ENMT: No tonsillar erythema, exudates, or enlargement; Moist mucous membranes, Good dentition, No lesions  CHEST/LUNG: Clear to percussion bilaterally; No rales, rhonchi, wheezing, or rubs  HEART: Regular rate and rhythm; No murmurs, rubs, or gallops  ABDOMEN: Firm, Nontender, Nondistended; Bowel sounds present  VASCULAR:  2+ Peripheral Pulses, No clubbing, cyanosis, or edema  SKIN: No rashes or lesions  NERVOUS SYSTEM:  Alert & Oriented X3, Good concentration; R sided resting pill-rolling tremor; Motor Strength 4/5 B/L upper and lower extremities

## 2017-10-29 NOTE — ED PROVIDER NOTE - MEDICAL DECISION MAKING DETAILS
38yo M with PMH Parkinson's disease who presents with diarrhea and worsening parkinson's symptoms. Will rule out infectious process and hydrate. Will obtain CMP, CBC, UA, stool O+P, CDiff, VBG and will reassess.

## 2017-10-29 NOTE — ED PROVIDER NOTE - ATTENDING CONTRIBUTION TO CARE
YULIET Attending Note - Dr. Monroe  38 yo M with PMH Parkinson's disease who presents with 2 days of weakness, confusion and worsening R sided tremor. He also reports 2 days of diarrhea, with 5 loose BMs per day  PE: pt is alert and oriented but lethargic, perrl, ent normal, membranes are dry, neck supple. no lymphadenopathy or thyroid enlargement, No JVD.  Chest clear to P&A, Heart- reg rhythm without murmur, rubs or gallops, radial pulses equal bilaterally.  Abd is soft, non-tender, Bowel sounds are active. no mass or organomegaly. : No CVA tenderness. Neuro:  Pt alert and oriented x 3. Perrl    Distal neurosensory is intact. Motor function is 5/5 strength bilaterally.  No focal deficits.  Pt has tremors, Extremities:  No edema.  Skin: warm and dry.  Impression:  Dehydration, enteritis  Plan: IV hydration, labs.

## 2017-10-29 NOTE — ED PROVIDER NOTE - NS ED ROS FT
REVIEW OF SYSTEMS:  CONSTITUTIONAL: +weakness, confusion; No fever, weight loss  EYES: No eye pain, visual disturbances, or discharge  ENMT:  No difficulty hearing, tinnitus, vertigo; No sinus or throat pain  NECK: No pain or stiffness  RESPIRATORY: No cough, wheezing, chills or hemoptysis; No shortness of breath  CARDIOVASCULAR: No chest pain, palpitations, dizziness, or leg swelling  GASTROINTESTINAL: +diarrhea; No abdominal or epigastric pain. No nausea, vomiting, or hematemesis; No constipation. No melena or hematochezia.  GENITOURINARY: No dysuria, frequency, hematuria, or incontinence  NEUROLOGICAL: +tremors; No headaches, memory loss, loss of strength, numbness  SKIN: No itching, burning, rashes, or lesions   MUSCULOSKELETAL: No joint pain or swelling; No muscle, back, or extremity pain  PSYCHIATRIC: No depression, anxiety, mood swings, or difficulty sleeping  HEME/LYMPH: No easy bruising, or bleeding gums  ALLERY AND IMMUNOLOGIC: No hives or eczema

## 2017-10-29 NOTE — ED PROVIDER NOTE - PROGRESS NOTE DETAILS
Jessica Dunn MD Update: Labs without abnormality. Patient feeling better after receiving fluids. Jessica Dunn MD Update: Labs without abnormality. Patient feeling better after receiving fluids. Instructed patient to follow up with neurologist within 1 week.

## 2017-11-14 ENCOUNTER — EMERGENCY (EMERGENCY)
Facility: HOSPITAL | Age: 37
LOS: 0 days | Discharge: HOME | End: 2017-11-14
Attending: EMERGENCY MEDICINE
Payer: MEDICARE

## 2017-11-14 VITALS
TEMPERATURE: 98 F | OXYGEN SATURATION: 99 % | HEIGHT: 67 IN | WEIGHT: 154.98 LBS | HEART RATE: 88 BPM | DIASTOLIC BLOOD PRESSURE: 80 MMHG | RESPIRATION RATE: 18 BRPM | SYSTOLIC BLOOD PRESSURE: 126 MMHG

## 2017-11-14 DIAGNOSIS — Z98.890 OTHER SPECIFIED POSTPROCEDURAL STATES: Chronic | ICD-10-CM

## 2017-11-14 DIAGNOSIS — F41.9 ANXIETY DISORDER, UNSPECIFIED: ICD-10-CM

## 2017-11-14 DIAGNOSIS — G20 PARKINSON'S DISEASE: ICD-10-CM

## 2017-11-14 DIAGNOSIS — M19.90 UNSPECIFIED OSTEOARTHRITIS, UNSPECIFIED SITE: ICD-10-CM

## 2017-11-14 PROCEDURE — 99283 EMERGENCY DEPT VISIT LOW MDM: CPT

## 2017-11-14 NOTE — ED ADULT NURSE NOTE - OBJECTIVE STATEMENT
pt stated that he had an argument with his brother he was streesed out andwas tangela. PTstated that this happen when stressed out. Pt has hx of parkinson

## 2017-11-14 NOTE — ED ADULT NURSE NOTE - NS ED NURSE DC INFO COMPLEXITY
Patient asked questions/Returned Demonstration/Simple: Patient demonstrates quick and easy understanding/Straightforward: Basic instructions, no meds, no home treatment/Verbalized Understanding

## 2017-11-14 NOTE — ED PROVIDER NOTE - MEDICAL DECISION MAKING DETAILS
Patient in good condition throughout ED course. He reports he feels well and is ready to go. Patient now discharged with care instructions.

## 2017-11-14 NOTE — ED PROVIDER NOTE - OBJECTIVE STATEMENT
Pertinent PMH/PSH/FHx/SHx and Review of Systems contained within:  38 yo m with PMH of Parkinson's BIBEMS for "shakiness" s/p argument with brother. As per patient, wife gave him his home medications prior to getting here and patient reports he now feels well and has no complaints. No aggravating or relieving factors, No fever/chills, No photophobia/eye pain/changes in vision, No ear pain/sore throat/dysphagia, No chest pain/palpitations, no SOB/cough/wheeze/stridor, No abdominal pain, No N/V/D, no dysuria/frequency/discharge, No neck/back pain, no rash, no changes in neurological status/function.

## 2017-12-01 ENCOUNTER — OUTPATIENT (OUTPATIENT)
Dept: OUTPATIENT SERVICES | Facility: HOSPITAL | Age: 37
LOS: 1 days | End: 2017-12-01
Payer: MEDICAID

## 2017-12-01 DIAGNOSIS — Z98.890 OTHER SPECIFIED POSTPROCEDURAL STATES: Chronic | ICD-10-CM

## 2017-12-01 PROCEDURE — G9001: CPT

## 2017-12-21 ENCOUNTER — EMERGENCY (EMERGENCY)
Facility: HOSPITAL | Age: 37
LOS: 0 days | Discharge: HOME | End: 2017-12-21
Attending: EMERGENCY MEDICINE
Payer: MEDICARE

## 2017-12-21 VITALS
TEMPERATURE: 98 F | HEART RATE: 95 BPM | OXYGEN SATURATION: 98 % | HEIGHT: 67 IN | RESPIRATION RATE: 16 BRPM | DIASTOLIC BLOOD PRESSURE: 69 MMHG | WEIGHT: 156.97 LBS | SYSTOLIC BLOOD PRESSURE: 124 MMHG

## 2017-12-21 VITALS
SYSTOLIC BLOOD PRESSURE: 122 MMHG | HEART RATE: 92 BPM | TEMPERATURE: 98 F | OXYGEN SATURATION: 99 % | DIASTOLIC BLOOD PRESSURE: 76 MMHG | RESPIRATION RATE: 19 BRPM

## 2017-12-21 DIAGNOSIS — Z98.890 OTHER SPECIFIED POSTPROCEDURAL STATES: Chronic | ICD-10-CM

## 2017-12-21 LAB
ALBUMIN SERPL ELPH-MCNC: 3.8 G/DL — SIGNIFICANT CHANGE UP (ref 3.3–5)
ALP SERPL-CCNC: 86 U/L — SIGNIFICANT CHANGE UP (ref 40–120)
ALT FLD-CCNC: 15 U/L — SIGNIFICANT CHANGE UP (ref 12–78)
ANION GAP SERPL CALC-SCNC: 6 MMOL/L — SIGNIFICANT CHANGE UP (ref 5–17)
APTT BLD: 38.8 SEC — HIGH (ref 27.5–37.4)
AST SERPL-CCNC: 19 U/L — SIGNIFICANT CHANGE UP (ref 15–37)
BASE EXCESS BLDA CALC-SCNC: 1.3 MMOL/L — SIGNIFICANT CHANGE UP (ref -2–2)
BASOPHILS # BLD AUTO: 0.1 K/UL — SIGNIFICANT CHANGE UP (ref 0–0.2)
BASOPHILS NFR BLD AUTO: 1.4 % — SIGNIFICANT CHANGE UP (ref 0–2)
BILIRUB SERPL-MCNC: 0.3 MG/DL — SIGNIFICANT CHANGE UP (ref 0.2–1.2)
BLOOD GAS COMMENTS: SIGNIFICANT CHANGE UP
BLOOD GAS COMMENTS: SIGNIFICANT CHANGE UP
BLOOD GAS SOURCE: SIGNIFICANT CHANGE UP
BUN SERPL-MCNC: 17 MG/DL — SIGNIFICANT CHANGE UP (ref 7–23)
CALCIUM SERPL-MCNC: 8.4 MG/DL — LOW (ref 8.5–10.1)
CHLORIDE SERPL-SCNC: 106 MMOL/L — SIGNIFICANT CHANGE UP (ref 96–108)
CK MB BLD-MCNC: 0.8 % — SIGNIFICANT CHANGE UP (ref 0–3.5)
CK MB CFR SERPL CALC: 0.8 NG/ML — SIGNIFICANT CHANGE UP (ref 0.5–3.6)
CK SERPL-CCNC: 98 U/L — SIGNIFICANT CHANGE UP (ref 26–308)
CO2 SERPL-SCNC: 28 MMOL/L — SIGNIFICANT CHANGE UP (ref 22–31)
CREAT SERPL-MCNC: 0.78 MG/DL — SIGNIFICANT CHANGE UP (ref 0.5–1.3)
EOSINOPHIL # BLD AUTO: 0.5 K/UL — SIGNIFICANT CHANGE UP (ref 0–0.5)
EOSINOPHIL NFR BLD AUTO: 6 % — SIGNIFICANT CHANGE UP (ref 0–6)
GLUCOSE SERPL-MCNC: 96 MG/DL — SIGNIFICANT CHANGE UP (ref 70–99)
HCO3 BLDA-SCNC: 26 MMOL/L — SIGNIFICANT CHANGE UP (ref 21–29)
HCT VFR BLD CALC: 47.3 % — SIGNIFICANT CHANGE UP (ref 39–50)
HGB BLD-MCNC: 15.1 G/DL — SIGNIFICANT CHANGE UP (ref 13–17)
HOROWITZ INDEX BLDA+IHG-RTO: 21 — SIGNIFICANT CHANGE UP
INR BLD: 1.05 RATIO — SIGNIFICANT CHANGE UP (ref 0.88–1.16)
LACTATE SERPL-SCNC: 1.5 MMOL/L — SIGNIFICANT CHANGE UP (ref 0.7–2)
LYMPHOCYTES # BLD AUTO: 2.1 K/UL — SIGNIFICANT CHANGE UP (ref 1–3.3)
LYMPHOCYTES # BLD AUTO: 26.9 % — SIGNIFICANT CHANGE UP (ref 13–44)
MCHC RBC-ENTMCNC: 27.2 PG — SIGNIFICANT CHANGE UP (ref 27–34)
MCHC RBC-ENTMCNC: 32 GM/DL — SIGNIFICANT CHANGE UP (ref 32–36)
MCV RBC AUTO: 85.3 FL — SIGNIFICANT CHANGE UP (ref 80–100)
MONOCYTES # BLD AUTO: 0.7 K/UL — SIGNIFICANT CHANGE UP (ref 0–0.9)
MONOCYTES NFR BLD AUTO: 9.1 % — SIGNIFICANT CHANGE UP (ref 2–14)
NEUTROPHILS # BLD AUTO: 4.4 K/UL — SIGNIFICANT CHANGE UP (ref 1.8–7.4)
NEUTROPHILS NFR BLD AUTO: 56.6 % — SIGNIFICANT CHANGE UP (ref 43–77)
PCO2 BLDA: 44 MMHG — SIGNIFICANT CHANGE UP (ref 32–46)
PH BLD: 7.39 — SIGNIFICANT CHANGE UP (ref 7.35–7.45)
PLATELET # BLD AUTO: 281 K/UL — SIGNIFICANT CHANGE UP (ref 150–400)
PO2 BLDA: 82 MMHG — SIGNIFICANT CHANGE UP (ref 74–108)
POTASSIUM SERPL-MCNC: 3.9 MMOL/L — SIGNIFICANT CHANGE UP (ref 3.5–5.3)
POTASSIUM SERPL-SCNC: 3.9 MMOL/L — SIGNIFICANT CHANGE UP (ref 3.5–5.3)
PROT SERPL-MCNC: 6.9 GM/DL — SIGNIFICANT CHANGE UP (ref 6–8.3)
PROTHROM AB SERPL-ACNC: 11.5 SEC — SIGNIFICANT CHANGE UP (ref 9.8–12.7)
RBC # BLD: 5.54 M/UL — SIGNIFICANT CHANGE UP (ref 4.2–5.8)
RBC # FLD: 11.5 % — SIGNIFICANT CHANGE UP (ref 11–15)
SAO2 % BLDA: 96 % — SIGNIFICANT CHANGE UP (ref 92–96)
SODIUM SERPL-SCNC: 140 MMOL/L — SIGNIFICANT CHANGE UP (ref 135–145)
TROPONIN I SERPL-MCNC: <.015 NG/ML — SIGNIFICANT CHANGE UP (ref 0.01–0.04)
WBC # BLD: 7.8 K/UL — SIGNIFICANT CHANGE UP (ref 3.8–10.5)
WBC # FLD AUTO: 7.8 K/UL — SIGNIFICANT CHANGE UP (ref 3.8–10.5)

## 2017-12-21 PROCEDURE — 70450 CT HEAD/BRAIN W/O DYE: CPT | Mod: 26

## 2017-12-21 PROCEDURE — 71275 CT ANGIOGRAPHY CHEST: CPT | Mod: 26

## 2017-12-21 PROCEDURE — 99285 EMERGENCY DEPT VISIT HI MDM: CPT

## 2017-12-21 RX ORDER — ALBUTEROL 90 UG/1
2 AEROSOL, METERED ORAL
Qty: 1 | Refills: 0 | OUTPATIENT
Start: 2017-12-21 | End: 2018-01-19

## 2017-12-21 RX ORDER — ALBUTEROL 90 UG/1
2.5 AEROSOL, METERED ORAL ONCE
Qty: 0 | Refills: 0 | Status: COMPLETED | OUTPATIENT
Start: 2017-12-21 | End: 2017-12-21

## 2017-12-21 RX ORDER — SODIUM CHLORIDE 9 MG/ML
1000 INJECTION INTRAMUSCULAR; INTRAVENOUS; SUBCUTANEOUS ONCE
Qty: 0 | Refills: 0 | Status: COMPLETED | OUTPATIENT
Start: 2017-12-21 | End: 2017-12-21

## 2017-12-21 RX ORDER — IPRATROPIUM/ALBUTEROL SULFATE 18-103MCG
3 AEROSOL WITH ADAPTER (GRAM) INHALATION ONCE
Qty: 0 | Refills: 0 | Status: COMPLETED | OUTPATIENT
Start: 2017-12-21 | End: 2017-12-21

## 2017-12-21 RX ADMIN — Medication 3 MILLILITER(S): at 20:41

## 2017-12-21 RX ADMIN — Medication 50 MILLIGRAM(S): at 20:42

## 2017-12-21 RX ADMIN — Medication 1 MILLIGRAM(S): at 21:30

## 2017-12-21 RX ADMIN — SODIUM CHLORIDE 1000 MILLILITER(S): 9 INJECTION INTRAMUSCULAR; INTRAVENOUS; SUBCUTANEOUS at 20:50

## 2017-12-21 RX ADMIN — ALBUTEROL 2.5 MILLIGRAM(S): 90 AEROSOL, METERED ORAL at 20:41

## 2017-12-21 NOTE — ED PROVIDER NOTE - PROGRESS NOTE DETAILS
Discussed patient with his neurosurgeon Dr. Blount [phone].  Per discussion with Dr. Blount, headaches have been consistent since his surgery.  There is minimal suspicion for infection of DBS at this stage.  CT head will be obtained to r/o collection or significant hydrocephalus.

## 2017-12-21 NOTE — ED PROVIDER NOTE - PHYSICAL EXAMINATION
Gen: Alert, no significant distress  Head: right cranial surgical scar appears clean and dry, no tenderness, erythema, or fluctuance, PERRL, EOMI, normal lids/conjunctiva  ENT: normal hearing, patent oropharynx without erythema/exudate/thrush, uvula midline  Neck: +supple, no tenderness/meningismus/JVD, +Trachea midline  Pulm: Bilateral BS, normal resp effort, no wheeze/stridor/retractions  CV: RRR, no M/R/G, +dist pulses  Abd: soft, NT/ND, +BS, no hepatosplenomegaly  Mskel: no edema/erythema/cyanosis  Skin: no rash, warm/dry  Neuro: AAOx3, resting tremor, generalized motor weakness

## 2017-12-21 NOTE — ED PROVIDER NOTE - OBJECTIVE STATEMENT
Pertinent PMH/PSH/FHx/SHx and Review of Systems contained within:  Patient presents to the ED for shortness of breath.  Patient has complicated medical history involving Parkinson's Disease which required placement of DBS on Nov 20, followed by superficial wound infection on Dec 8 which required local debridement.  Patient is scheduled to undergo the second part of his DBS surgery in early Jan, was having pre op eval today, had been complaining of shortness of breath with audible wheezing, sent to ER for further work up.  Patient tried using albuterol pump at home without relief.  Started with shortness of breath, wheezing, and dry cough today.  Denies fever, chest pain, or leg swelling.  Patient is also complaining of a headache, but has been having intermittent headaches since his surgery.     Relevant PMHx/SHx/SOCHx/FAMH:  Parkinsons, Asthma in childhood, Osteonyelitis of finger leading to bacteremia, arthritis  Patient denies EtOH/tobacco/illicit substance use.  PMD: Dr. Jewell    ROS: No fever/chills, No photophobia/eye pain/changes in vision, No ear pain/sore throat/dysphagia, No chest pain/palpitations, no stridor, No abdominal pain, No N/V/D/melena, no dysuria/frequency/discharge, No neck/back pain, no rash, no changes in neurological status/function.

## 2017-12-21 NOTE — ED ADULT NURSE NOTE - OBJECTIVE STATEMENT
Assumed care of patient at this time with complaint of SOB since last night and wheezing, patient has hx of parkinson's with surgery weeks ago, hx of asthma in his childhood Assumed care of patient at this time with complaint of SOB since last night and wheezing, patient has hx of parkinson's with surgery weeks ago, hx of asthma in his childhood, patient has also been complaining of headache yesterday. denies blurry vision or dizziness at this time

## 2017-12-21 NOTE — ED PROVIDER NOTE - MEDICAL DECISION MAKING DETAILS
Patient is feeling much improved after nebs in ER.  VSS, labs, CT scans without any gross abnormalities to warrant admission.  CTA study limited, but patient denies any calf pain or leg swelling.  Wheezing is gone.  Patient says that the albuterol he used at home was his wifes.  Will prescribe pump and prednisone.  Discussed results and outcome of today's visit with the patient.  Patient advised to please follow up with another healthcare provider within the next 24 hours and return to the Emergency Department for worsening symptoms or any other concerns.  Patient advised that their doctor may call  to follow up on the specific results of the tests performed today in the emergency department.   Patient appears well on discharge. Patient is feeling much improved after nebs in ER.  VSS, labs, CT scans without any gross abnormalities to warrant admission.  CTA study limited, but patient denies any calf pain or leg swelling.  Wheezing is gone.   Patient says that the albuterol he used at home was his wifes.  Will prescribe pump and prednisone.  No signs or symptoms of infection.  Discussed results and outcome of today's visit with the patient.  Patient advised to please follow up with another healthcare provider within the next 24 hours and return to the Emergency Department for worsening symptoms or any other concerns.  Patient advised that their doctor may call  to follow up on the specific results of the tests performed today in the emergency department.   Patient appears well on discharge. Patient is feeling much improved after nebs in ER.  VSS, labs, CT scans without any gross abnormalities to warrant admission.  CTA study limited, but patient denies any calf pain or leg swelling.  Wheezing is gone.   Patient says that the albuterol he used at home was his wifes.  Will prescribe pump and prednisone.  No signs or symptoms of infection on work up, patient says that he feels great.  Discussed results and outcome of today's visit with the patient.  Patient advised to please follow up with another healthcare provider within the next 24 hours and return to the Emergency Department for worsening symptoms or any other concerns.  Patient advised that their doctor may call  to follow up on the specific results of the tests performed today in the emergency department.   Patient appears well on discharge.

## 2017-12-22 DIAGNOSIS — R06.02 SHORTNESS OF BREATH: ICD-10-CM

## 2017-12-22 DIAGNOSIS — G20 PARKINSON'S DISEASE: ICD-10-CM

## 2017-12-22 DIAGNOSIS — M19.90 UNSPECIFIED OSTEOARTHRITIS, UNSPECIFIED SITE: ICD-10-CM

## 2017-12-22 DIAGNOSIS — R69 ILLNESS, UNSPECIFIED: ICD-10-CM

## 2017-12-22 DIAGNOSIS — J45.901 UNSPECIFIED ASTHMA WITH (ACUTE) EXACERBATION: ICD-10-CM

## 2017-12-22 DIAGNOSIS — R06.00 DYSPNEA, UNSPECIFIED: ICD-10-CM

## 2017-12-27 LAB
CULTURE RESULTS: SIGNIFICANT CHANGE UP
CULTURE RESULTS: SIGNIFICANT CHANGE UP
SPECIMEN SOURCE: SIGNIFICANT CHANGE UP
SPECIMEN SOURCE: SIGNIFICANT CHANGE UP

## 2018-01-15 ENCOUNTER — INPATIENT (INPATIENT)
Facility: HOSPITAL | Age: 38
LOS: 2 days | Discharge: HOME | End: 2018-01-18
Attending: HOSPITALIST | Admitting: HOSPITALIST
Payer: MEDICARE

## 2018-01-15 VITALS
WEIGHT: 149.91 LBS | OXYGEN SATURATION: 97 % | HEART RATE: 130 BPM | DIASTOLIC BLOOD PRESSURE: 81 MMHG | RESPIRATION RATE: 16 BRPM | TEMPERATURE: 99 F | HEIGHT: 67 IN | SYSTOLIC BLOOD PRESSURE: 120 MMHG

## 2018-01-15 DIAGNOSIS — G20 PARKINSON'S DISEASE: ICD-10-CM

## 2018-01-15 DIAGNOSIS — J18.9 PNEUMONIA, UNSPECIFIED ORGANISM: ICD-10-CM

## 2018-01-15 DIAGNOSIS — Z98.890 OTHER SPECIFIED POSTPROCEDURAL STATES: Chronic | ICD-10-CM

## 2018-01-15 DIAGNOSIS — Z29.9 ENCOUNTER FOR PROPHYLACTIC MEASURES, UNSPECIFIED: ICD-10-CM

## 2018-01-15 LAB
ALBUMIN SERPL ELPH-MCNC: 4 G/DL — SIGNIFICANT CHANGE UP (ref 3.3–5)
ALP SERPL-CCNC: 94 U/L — SIGNIFICANT CHANGE UP (ref 40–120)
ALT FLD-CCNC: 10 U/L — LOW (ref 12–78)
ANION GAP SERPL CALC-SCNC: 7 MMOL/L — SIGNIFICANT CHANGE UP (ref 5–17)
ANION GAP SERPL CALC-SCNC: 9 MMOL/L — SIGNIFICANT CHANGE UP (ref 5–17)
APPEARANCE UR: CLEAR — SIGNIFICANT CHANGE UP
AST SERPL-CCNC: 15 U/L — SIGNIFICANT CHANGE UP (ref 15–37)
BACTERIA # UR AUTO: ABNORMAL
BASOPHILS # BLD AUTO: 0.1 K/UL — SIGNIFICANT CHANGE UP (ref 0–0.2)
BASOPHILS NFR BLD AUTO: 0.9 % — SIGNIFICANT CHANGE UP (ref 0–2)
BILIRUB SERPL-MCNC: 0.7 MG/DL — SIGNIFICANT CHANGE UP (ref 0.2–1.2)
BILIRUB UR-MCNC: NEGATIVE — SIGNIFICANT CHANGE UP
BUN SERPL-MCNC: 14 MG/DL — SIGNIFICANT CHANGE UP (ref 7–23)
BUN SERPL-MCNC: 15 MG/DL — SIGNIFICANT CHANGE UP (ref 7–23)
CALCIUM SERPL-MCNC: 8 MG/DL — LOW (ref 8.5–10.1)
CALCIUM SERPL-MCNC: 8.8 MG/DL — SIGNIFICANT CHANGE UP (ref 8.5–10.1)
CHLORIDE SERPL-SCNC: 103 MMOL/L — SIGNIFICANT CHANGE UP (ref 96–108)
CHLORIDE SERPL-SCNC: 105 MMOL/L — SIGNIFICANT CHANGE UP (ref 96–108)
CO2 SERPL-SCNC: 25 MMOL/L — SIGNIFICANT CHANGE UP (ref 22–31)
CO2 SERPL-SCNC: 28 MMOL/L — SIGNIFICANT CHANGE UP (ref 22–31)
COLOR SPEC: YELLOW — SIGNIFICANT CHANGE UP
CREAT SERPL-MCNC: 0.8 MG/DL — SIGNIFICANT CHANGE UP (ref 0.5–1.3)
CREAT SERPL-MCNC: 0.83 MG/DL — SIGNIFICANT CHANGE UP (ref 0.5–1.3)
DIFF PNL FLD: ABNORMAL
EOSINOPHIL # BLD AUTO: 0.1 K/UL — SIGNIFICANT CHANGE UP (ref 0–0.5)
EOSINOPHIL NFR BLD AUTO: 0.7 % — SIGNIFICANT CHANGE UP (ref 0–6)
EPI CELLS # UR: SIGNIFICANT CHANGE UP
FLUAV SPEC QL CULT: NEGATIVE — SIGNIFICANT CHANGE UP
FLUBV AG SPEC QL IA: NEGATIVE — SIGNIFICANT CHANGE UP
GLUCOSE SERPL-MCNC: 108 MG/DL — HIGH (ref 70–99)
GLUCOSE SERPL-MCNC: 115 MG/DL — HIGH (ref 70–99)
GLUCOSE UR QL: NEGATIVE MG/DL — SIGNIFICANT CHANGE UP
HCT VFR BLD CALC: 44.6 % — SIGNIFICANT CHANGE UP (ref 39–50)
HCT VFR BLD CALC: 49.6 % — SIGNIFICANT CHANGE UP (ref 39–50)
HGB BLD-MCNC: 14.3 G/DL — SIGNIFICANT CHANGE UP (ref 13–17)
HGB BLD-MCNC: 15.8 G/DL — SIGNIFICANT CHANGE UP (ref 13–17)
KETONES UR-MCNC: NEGATIVE — SIGNIFICANT CHANGE UP
LACTATE SERPL-SCNC: 0.9 MMOL/L — SIGNIFICANT CHANGE UP (ref 0.7–2)
LEUKOCYTE ESTERASE UR-ACNC: ABNORMAL
LYMPHOCYTES # BLD AUTO: 0.8 K/UL — LOW (ref 1–3.3)
LYMPHOCYTES # BLD AUTO: 9.3 % — LOW (ref 13–44)
MCHC RBC-ENTMCNC: 26.5 PG — LOW (ref 27–34)
MCHC RBC-ENTMCNC: 27 PG — SIGNIFICANT CHANGE UP (ref 27–34)
MCHC RBC-ENTMCNC: 31.8 GM/DL — LOW (ref 32–36)
MCHC RBC-ENTMCNC: 32.1 GM/DL — SIGNIFICANT CHANGE UP (ref 32–36)
MCV RBC AUTO: 83.3 FL — SIGNIFICANT CHANGE UP (ref 80–100)
MCV RBC AUTO: 84.1 FL — SIGNIFICANT CHANGE UP (ref 80–100)
MONOCYTES # BLD AUTO: 0.6 K/UL — SIGNIFICANT CHANGE UP (ref 0–0.9)
MONOCYTES NFR BLD AUTO: 7.4 % — SIGNIFICANT CHANGE UP (ref 2–14)
NEUTROPHILS # BLD AUTO: 7.1 K/UL — SIGNIFICANT CHANGE UP (ref 1.8–7.4)
NEUTROPHILS NFR BLD AUTO: 81.8 % — HIGH (ref 43–77)
NITRITE UR-MCNC: NEGATIVE — SIGNIFICANT CHANGE UP
PH UR: 7 — SIGNIFICANT CHANGE UP (ref 5–8)
PLATELET # BLD AUTO: 257 K/UL — SIGNIFICANT CHANGE UP (ref 150–400)
PLATELET # BLD AUTO: 280 K/UL — SIGNIFICANT CHANGE UP (ref 150–400)
POTASSIUM SERPL-MCNC: 3.7 MMOL/L — SIGNIFICANT CHANGE UP (ref 3.5–5.3)
POTASSIUM SERPL-MCNC: 3.7 MMOL/L — SIGNIFICANT CHANGE UP (ref 3.5–5.3)
POTASSIUM SERPL-SCNC: 3.7 MMOL/L — SIGNIFICANT CHANGE UP (ref 3.5–5.3)
POTASSIUM SERPL-SCNC: 3.7 MMOL/L — SIGNIFICANT CHANGE UP (ref 3.5–5.3)
PROT SERPL-MCNC: 7.6 GM/DL — SIGNIFICANT CHANGE UP (ref 6–8.3)
PROT UR-MCNC: NEGATIVE MG/DL — SIGNIFICANT CHANGE UP
RBC # BLD: 5.31 M/UL — SIGNIFICANT CHANGE UP (ref 4.2–5.8)
RBC # BLD: 5.96 M/UL — HIGH (ref 4.2–5.8)
RBC # FLD: 11.6 % — SIGNIFICANT CHANGE UP (ref 11–15)
RBC # FLD: 12 % — SIGNIFICANT CHANGE UP (ref 11–15)
RBC CASTS # UR COMP ASSIST: SIGNIFICANT CHANGE UP /HPF (ref 0–4)
SODIUM SERPL-SCNC: 138 MMOL/L — SIGNIFICANT CHANGE UP (ref 135–145)
SODIUM SERPL-SCNC: 139 MMOL/L — SIGNIFICANT CHANGE UP (ref 135–145)
SP GR SPEC: 1.01 — SIGNIFICANT CHANGE UP (ref 1.01–1.02)
UROBILINOGEN FLD QL: NEGATIVE MG/DL — SIGNIFICANT CHANGE UP
WBC # BLD: 8.1 K/UL — SIGNIFICANT CHANGE UP (ref 3.8–10.5)
WBC # BLD: 8.6 K/UL — SIGNIFICANT CHANGE UP (ref 3.8–10.5)
WBC # FLD AUTO: 8.1 K/UL — SIGNIFICANT CHANGE UP (ref 3.8–10.5)
WBC # FLD AUTO: 8.6 K/UL — SIGNIFICANT CHANGE UP (ref 3.8–10.5)
WBC UR QL: SIGNIFICANT CHANGE UP

## 2018-01-15 PROCEDURE — 99285 EMERGENCY DEPT VISIT HI MDM: CPT

## 2018-01-15 PROCEDURE — 99223 1ST HOSP IP/OBS HIGH 75: CPT | Mod: AI

## 2018-01-15 PROCEDURE — 12345: CPT | Mod: NC

## 2018-01-15 PROCEDURE — 71045 X-RAY EXAM CHEST 1 VIEW: CPT | Mod: 26

## 2018-01-15 RX ORDER — CARBIDOPA AND LEVODOPA 25; 100 MG/1; MG/1
2 TABLET ORAL
Qty: 0 | Refills: 0 | Status: DISCONTINUED | OUTPATIENT
Start: 2018-01-15 | End: 2018-01-18

## 2018-01-15 RX ORDER — QUETIAPINE FUMARATE 200 MG/1
1 TABLET, FILM COATED ORAL
Qty: 0 | Refills: 0 | COMMUNITY

## 2018-01-15 RX ORDER — PRAMIPEXOLE DIHYDROCHLORIDE 0.12 MG/1
1.5 TABLET ORAL THREE TIMES A DAY
Qty: 0 | Refills: 0 | Status: DISCONTINUED | OUTPATIENT
Start: 2018-01-15 | End: 2018-01-15

## 2018-01-15 RX ORDER — PIPERACILLIN AND TAZOBACTAM 4; .5 G/20ML; G/20ML
3.38 INJECTION, POWDER, LYOPHILIZED, FOR SOLUTION INTRAVENOUS EVERY 8 HOURS
Qty: 0 | Refills: 0 | Status: DISCONTINUED | OUTPATIENT
Start: 2018-01-15 | End: 2018-01-18

## 2018-01-15 RX ORDER — CARBIDOPA AND LEVODOPA 25; 100 MG/1; MG/1
1.5 TABLET ORAL
Qty: 0 | Refills: 0 | Status: DISCONTINUED | OUTPATIENT
Start: 2018-01-15 | End: 2018-01-15

## 2018-01-15 RX ORDER — SODIUM CHLORIDE 9 MG/ML
3 INJECTION INTRAMUSCULAR; INTRAVENOUS; SUBCUTANEOUS ONCE
Qty: 0 | Refills: 0 | Status: COMPLETED | OUTPATIENT
Start: 2018-01-15 | End: 2018-01-15

## 2018-01-15 RX ORDER — ESCITALOPRAM OXALATE 10 MG/1
5 TABLET, FILM COATED ORAL DAILY
Qty: 0 | Refills: 0 | Status: DISCONTINUED | OUTPATIENT
Start: 2018-01-15 | End: 2018-01-18

## 2018-01-15 RX ORDER — ACETAMINOPHEN 500 MG
650 TABLET ORAL EVERY 6 HOURS
Qty: 0 | Refills: 0 | Status: DISCONTINUED | OUTPATIENT
Start: 2018-01-15 | End: 2018-01-18

## 2018-01-15 RX ORDER — ENTACAPONE 200 MG/1
200 TABLET, FILM COATED ORAL
Qty: 0 | Refills: 0 | Status: DISCONTINUED | OUTPATIENT
Start: 2018-01-15 | End: 2018-01-15

## 2018-01-15 RX ORDER — QUETIAPINE FUMARATE 200 MG/1
25 TABLET, FILM COATED ORAL AT BEDTIME
Qty: 0 | Refills: 0 | Status: DISCONTINUED | OUTPATIENT
Start: 2018-01-15 | End: 2018-01-18

## 2018-01-15 RX ORDER — PIPERACILLIN AND TAZOBACTAM 4; .5 G/20ML; G/20ML
3.38 INJECTION, POWDER, LYOPHILIZED, FOR SOLUTION INTRAVENOUS ONCE
Qty: 0 | Refills: 0 | Status: COMPLETED | OUTPATIENT
Start: 2018-01-15 | End: 2018-01-16

## 2018-01-15 RX ORDER — ENTACAPONE 200 MG/1
1 TABLET, FILM COATED ORAL
Qty: 0 | Refills: 0 | COMMUNITY

## 2018-01-15 RX ORDER — CARBIDOPA AND LEVODOPA 25; 100 MG/1; MG/1
2 TABLET ORAL
Qty: 0 | Refills: 0 | Status: DISCONTINUED | OUTPATIENT
Start: 2018-01-15 | End: 2018-01-15

## 2018-01-15 RX ORDER — IPRATROPIUM/ALBUTEROL SULFATE 18-103MCG
3 AEROSOL WITH ADAPTER (GRAM) INHALATION EVERY 6 HOURS
Qty: 0 | Refills: 0 | Status: DISCONTINUED | OUTPATIENT
Start: 2018-01-15 | End: 2018-01-18

## 2018-01-15 RX ORDER — PRAMIPEXOLE DIHYDROCHLORIDE 0.12 MG/1
1 TABLET ORAL
Qty: 0 | Refills: 0 | COMMUNITY

## 2018-01-15 RX ORDER — PRAMIPEXOLE DIHYDROCHLORIDE 0.12 MG/1
1 TABLET ORAL
Qty: 0 | Refills: 0 | Status: DISCONTINUED | OUTPATIENT
Start: 2018-01-15 | End: 2018-01-15

## 2018-01-15 RX ORDER — VANCOMYCIN HCL 1 G
1000 VIAL (EA) INTRAVENOUS ONCE
Qty: 0 | Refills: 0 | Status: COMPLETED | OUTPATIENT
Start: 2018-01-15 | End: 2018-01-15

## 2018-01-15 RX ORDER — CARBIDOPA AND LEVODOPA 25; 100 MG/1; MG/1
2 TABLET ORAL
Qty: 0 | Refills: 0 | COMMUNITY

## 2018-01-15 RX ORDER — QUETIAPINE FUMARATE 200 MG/1
25 TABLET, FILM COATED ORAL THREE TIMES A DAY
Qty: 0 | Refills: 0 | Status: DISCONTINUED | OUTPATIENT
Start: 2018-01-15 | End: 2018-01-15

## 2018-01-15 RX ADMIN — CARBIDOPA AND LEVODOPA 1.5 TABLET(S): 25; 100 TABLET ORAL at 11:00

## 2018-01-15 RX ADMIN — Medication 250 MILLIGRAM(S): at 06:44

## 2018-01-15 RX ADMIN — PIPERACILLIN AND TAZOBACTAM 12.5 GRAM(S): 4; .5 INJECTION, POWDER, LYOPHILIZED, FOR SOLUTION INTRAVENOUS at 14:29

## 2018-01-15 RX ADMIN — SODIUM CHLORIDE 3 MILLILITER(S): 9 INJECTION INTRAMUSCULAR; INTRAVENOUS; SUBCUTANEOUS at 04:30

## 2018-01-15 RX ADMIN — CARBIDOPA AND LEVODOPA 2 TABLET(S): 25; 100 TABLET ORAL at 16:44

## 2018-01-15 RX ADMIN — CARBIDOPA AND LEVODOPA 2 TABLET(S): 25; 100 TABLET ORAL at 20:49

## 2018-01-15 RX ADMIN — PIPERACILLIN AND TAZOBACTAM 12.5 GRAM(S): 4; .5 INJECTION, POWDER, LYOPHILIZED, FOR SOLUTION INTRAVENOUS at 22:04

## 2018-01-15 RX ADMIN — QUETIAPINE FUMARATE 25 MILLIGRAM(S): 200 TABLET, FILM COATED ORAL at 23:24

## 2018-01-15 RX ADMIN — ESCITALOPRAM OXALATE 5 MILLIGRAM(S): 10 TABLET, FILM COATED ORAL at 22:04

## 2018-01-15 NOTE — CONSULT NOTE ADULT - ASSESSMENT
ac bronchitis lingering   flu work up   antibiotics ?? need ??   procalcitonin   repeat ct chest now   abax   may need short steroid taper   leighann a month of current pulm signs and symptoms   will follow with you thanks

## 2018-01-15 NOTE — H&P ADULT - NSHPLABSRESULTS_GEN_ALL_CORE
Vital Signs Last 24 Hrs  T(C): 37.4 (15 Cuate 2018 02:32), Max: 37.4 (15 Cuate 2018 02:32)  T(F): 99.4 (15 Cuate 2018 02:32), Max: 99.4 (15 Cuate 2018 02:32)  HR: 130 (15 Cuate 2018 02:32) (130 - 130)  BP: 120/81 (15 Cuate 2018 02:32) (120/81 - 120/81)  BP(mean): --  RR: 16 (15 Cuate 2018 02:32) (16 - 16)  SpO2: 97% (15 Cuate 2018 02:32) (97% - 97%)        LABS:                        15.8   8.6   )-----------( 280      ( 15 Cuate 2018 04:28 )             49.6     01-15    138  |  103  |  15  ----------------------------<  108<H>  3.7   |  28  |  0.80    Ca    8.8      15 Cuate 2018 04:28    TPro  7.6  /  Alb  4.0  /  TBili  0.7  /  DBili  x   /  AST  15  /  ALT  10<L>  /  AlkPhos  94  01-15          RADIOLOGY & ADDITIONAL STUDIES:    CXR:  RLL infiltrate, official read pending

## 2018-01-15 NOTE — CHART NOTE - NSCHARTNOTEFT_GEN_A_CORE
Pt seen/examined at bed side.  feels better  sob/cough resolving  saturating well on RA  + termors given h/o parkinson dis    h/p, labs, imaging reviewed, c/w current Rx, d/w patient and RN.  Pt will be followed by hospitalist team. Pt seen/examined at bed side.  feels better  sob/cough resolving  saturating well on RA  + termors given h/o parkinson dis    h/p, labs, imaging reviewed, c/w current Rx, d/w patient and RN.  Pt will be followed by hospitalist team.  ID consult/Dr. wilkins

## 2018-01-15 NOTE — H&P ADULT - PROBLEM SELECTOR PLAN 1
Given Vanco and Zosyn ED, continue for now  O2 via NC@ 2L/Min, keep sat >94% at all times.  Send Blood Cx 2 sets  Sputum cx, legionella  Albuterol/Atrovent 1 unit neb Q6hrs.  Follow up official CXR report  Tylenol 650mg po q6hrs PRN for fever.  Robitussin 10ml PO Q8hrs PRN for cough.

## 2018-01-15 NOTE — H&P ADULT - NSHPPHYSICALEXAM_GEN_ALL_CORE
GENERAL: ill-appearing, well-groomed, well-developed  HEAD:  Atraumatic, Normocephalic  EYES: EOMI, PERRLA, conjunctiva and sclera clear  ENMT: No tonsillar erythema, exudates, or enlargement; Moist mucous membranes, No lesions  NECK: Supple, No JVD, Normal thyroid  NERVOUS SYSTEM:  Alert & Oriented X3, Good concentration, mild tremor  CHEST/LUNG: CTA. No rales, rhonchi, wheezing, or rubs  HEART: Regular rate and rhythm; No murmurs, rubs, or gallops  ABDOMEN: Soft, Nontender, Nondistended; Bowel sounds present  EXTREMITIES: No clubbing, cyanosis, or edema  SKIN: no rashes or lesions  PSYCH: flat affect

## 2018-01-15 NOTE — PHARMACY COMMUNICATION NOTE - REASON FOR NOTE
PA confirmed with the family that 2 tabs five times a day is the correct regimen patient is on at home

## 2018-01-15 NOTE — ED ADULT TRIAGE NOTE - CHIEF COMPLAINT QUOTE
As per wife pt has fever, weakness and confusion x yesterday. Wife gave pt Tylenol 500mg 2 tabs at 2300.

## 2018-01-15 NOTE — CONSULT NOTE ADULT - SUBJECTIVE AND OBJECTIVE BOX
HPI:  37 year old man with PMH of Parkinson's s/p theta burst stimulation last month and arthritis presents in ED for 2 day history of fever associated with cough with yellowish sputum.  As per chart, patient's family took temperature at home which was 103 F.  Patient states that he was in his usual state of health until he felt like he had a "bad cold" which has gotten progressively worse.  In the ED, prelim read of CXR is RLL PNA, CBC shows left shift, no leukocytosis, lactate normal.  Rapid flu neg. (15 Cuate 2018 06:28)  patient is well known to me for years     PAST MEDICAL & SURGICAL HISTORY:  Arthritis  Parkinson disease  Brain surgery within last 3 months  H/O inguinal hernia repair      SOCHX:   tobacco,  -  alcohol    FMHX: FA/MO  - contributory       Recent Travel:    Immunizations:    Allergies    cefazolin (Flushing; Rash)    Intolerances        MEDICATIONS  (STANDING):  carbidopa/levodopa  25/100 1.5 Tablet(s) Oral four times a day  entacapone 200 milliGRAM(s) Oral four times a day  escitalopram 5 milliGRAM(s) Oral daily  piperacillin/tazobactam IVPB. 3.375 Gram(s) IV Intermittent once  piperacillin/tazobactam IVPB. 3.375 Gram(s) IV Intermittent every 8 hours  pramipexole 1.5 milliGRAM(s) Oral three times a day  QUEtiapine 25 milliGRAM(s) Oral three times a day    MEDICATIONS  (PRN):  acetaminophen   Tablet 650 milliGRAM(s) Oral every 6 hours PRN For Temp greater than 38 C (100.4 F)  ALBUTerol/ipratropium for Nebulization 3 milliLiter(s) Nebulizer every 6 hours PRN Shortness of Breath and/or Wheezing  guaiFENesin    Syrup 200 milliGRAM(s) Oral every 6 hours PRN Cough      REVIEW OF SYSTEMS:  CONSTITUTIONAL: No fever, weight loss, or fatigue  EYES: No eye pain, visual disturbances, or discharge  ENMT:  No difficulty hearing, tinnitus, vertigo; No sinus or throat pain  NECK: No pain or stiffness  BREASTS: No pain, masses, or nipple discharge  RESPIRATORY: No cough, wheezing, chills or hemoptysis; No shortness of breath  CARDIOVASCULAR: No chest pain, palpitations, dizziness, or leg swelling  GASTROINTESTINAL: No abdominal or epigastric pain. No nausea, vomiting, or hematemesis; No diarrhea or constipation. No melena or hematochezia.  GENITOURINARY: No dysuria, frequency, hematuria, or incontinence  NEUROLOGICAL: No headaches, memory loss, loss of strength, numbness, or tremors  SKIN: No itching, burning, rashes, or lesions   LYMPH NODES: No enlarged glands  ENDOCRINE: No heat or cold intolerance; No hair loss  MUSCULOSKELETAL: No joint pain or swelling; No muscle, back, or extremity pain  PSYCHIATRIC: No depression, anxiety, mood swings, or difficulty sleeping  HEME/LYMPH: No easy bruising, or bleeding gums  ALLERGY AND IMMUNOLOGIC: No hives or eczema    VITAL SIGNS:    T(C): 36.9 (01-15-18 @ 13:31), Max: 37.4 (01-15-18 @ 02:32)  T(F): 98.4 (01-15-18 @ 13:31), Max: 99.4 (01-15-18 @ 02:32)  HR: 103 (01-15-18 @ 13:31) (102 - 130)  BP: 112/60 (01-15-18 @ 13:31) (112/60 - 126/74)  RR: 19 (01-15-18 @ 13:31) (16 - 19)  SpO2: 97% (01-15-18 @ 13:31) (96% - 97%)    PHYSICAL EXAM:    GENERAL: NAD, well-groomed, well-developed  HEAD:  Atraumatic, Normocephalic  EYES: EOMI, PERRLA, conjunctiva and sclera clear  ENMT: No tonsillar erythema, exudates, or enlargement; Moist mucous membranes, Good dentition, No lesions  NECK: Supple, No JVD, Normal thyroid  NERVOUS SYSTEM:  Alert & Oriented X3, Good concentration; Motor Strength 5/5 B/L upper and lower extremities; DTRs 2+ intact and symmetric  CHEST/LUNG: Clear to auscultation bilaterally; No rales, rhonchi, wheezing bilaterally  HEART: Regular rate and rhythm; No murmurs, rubs, or gallops  ABDOMEN: Soft, Nontender, Nondistended; Bowel sounds present  EXTREMITIES:  2+ Peripheral Pulses, No clubbing, cyanosis, or edema  LYMPH: No lymphadenopathy noted  SKIN: No rashes or lesions  BACK: no pressor sore     LABS:                         14.3   8.1   )-----------( 257      ( 15 Cuate 2018 08:01 )             44.6     01-15    139  |  105  |  14  ----------------------------<  115<H>  3.7   |  25  |  0.83    Ca    8.0<L>      15 Cuate 2018 08:01    TPro  7.6  /  Alb  4.0  /  TBili  0.7  /  DBili  x   /  AST  15  /  ALT  10<L>  /  AlkPhos  94  01-15    LIVER FUNCTIONS - ( 15 Cuate 2018 04:28 )  Alb: 4.0 g/dL / Pro: 7.6 gm/dL / ALK PHOS: 94 U/L / ALT: 10 U/L / AST: 15 U/L / GGT: x                                                 Radiology:    < from: CT Angio Chest w/ IV Cont (12.21.17 @ 21:58) >  MPRESSION:     No evidence of a pulmonary embolism. Distal branches are not well   evaluated due to motion.    Right chest wall device with leads extending superiorly along the right   neck                DESMOND DIALLO M.D., ATTENDING RADIOLOGIST  This document has been electronically signed. Dec 21 2017 10:32PM          < from: Xray Chest 1 View AP/PA. (01.15.18 @ 04:32) >  ROCEDURE DATE:  01/15/2018          INTERPRETATION:  cough    A frontal chest film  demonstrates grossly clear lungs.  No acute   infiltrate or consolidation is  noted. The costophrenic anglesare   grossly clear.    The heart size and vascular markings are within normal limits for   technique.      Neurostimulator device overlies the right chest wall. .     The osseous structures appear intact intact.     IMPRESSION:  Clear  lungs.  No acute airspace disease suggested.                ROSA ISELA MENDOZA M.D., ATTENDING RADIOLOGIST  This document has been electronically signed. Cuate 15 2018  8:39AM                < end of copied text >        < end of copied text > 37 year old man with PMH of Parkinson's s/p theta burst stimulation last month and arthritis presents in ED for 2 day history of fever associated with cough with yellowish sputum.  As per chart, patient's family took temperature at home which was 103 F.  Patient states that he was in his usual state of health until he felt like he had a "bad cold" which has gotten progressively worse.  In the ED, prelim read of CXR is RLL PNA, CBC shows left shift, no leukocytosis, lactate normal.  Rapid flu neg. (15 Cuate 2018 06:28)  patient is well known to me for years     PAST MEDICAL & SURGICAL HISTORY:  Arthritis  Parkinson disease  Brain surgery within last 3 months for parkinsonism   H/O inguinal hernia repair      SOCHX:   ex tobacco  none in 10 years ,  - no alcohol    FMHX: FA/MO  -non contributory       Recent Travel:    Immunizations:    Allergies    cefazolin (Flushing; Rash)    Intolerances        MEDICATIONS  (STANDING):  carbidopa/levodopa  25/100 1.5 Tablet(s) Oral four times a day  entacapone 200 milliGRAM(s) Oral four times a day  escitalopram 5 milliGRAM(s) Oral daily  piperacillin/tazobactam IVPB. 3.375 Gram(s) IV Intermittent once  piperacillin/tazobactam IVPB. 3.375 Gram(s) IV Intermittent every 8 hours  pramipexole 1.5 milliGRAM(s) Oral three times a day  QUEtiapine 25 milliGRAM(s) Oral three times a day    MEDICATIONS  (PRN):  acetaminophen   Tablet 650 milliGRAM(s) Oral every 6 hours PRN For Temp greater than 38 C (100.4 F)  ALBUTerol/ipratropium for Nebulization 3 milliLiter(s) Nebulizer every 6 hours PRN Shortness of Breath and/or Wheezing  guaiFENesin    Syrup 200 milliGRAM(s) Oral every 6 hours PRN Cough      REVIEW OF SYSTEMS:  CONSTITUTIONAL: No fever, weight loss, or fatigue  EYES: No eye pain, visual disturbances, or discharge  ENMT:  No difficulty hearing, tinnitus, vertigo; No sinus or throat pain  NECK: No pain or stiffness  RESPIRATORY: has  cough, wheezing,  no  chills or hemoptysis; No shortness of breath  has had pulm signs and symptoms since decreased   had asthma and antibiotics decreased 21 for ae of asthma   CARDIOVASCULAR: No chest pain, palpitations, dizziness, or leg swelling  GASTROINTESTINAL: No abdominal or epigastric pain. No nausea, vomiting, or hematemesis; No diarrhea or constipation. No melena or hematochezia.  GENITOURINARY: No dysuria, frequency, hematuria, or incontinence  NEUROLOGICAL: No headaches, memory loss, loss of strength, numbness,   left leg tremors gone after sx  rt leg marked tremors rt arm still no change   SKIN: No itching, burning, rashes, or lesions   LYMPH NODES: No enlarged glands  ENDOCRINE: No heat or cold intolerance; No hair loss  MUSCULOSKELETAL: No joint pain or swelling; No muscle, back, or extremity pain  battery pack plus in rt chest   no infection  PSYCHIATRIC: No depression, anxiety, mood swings, or difficulty sleeping  HEME/LYMPH: No easy bruising, or bleeding gums  ALLERGY AND IMMUNOLOGIC: No hives or eczema    VITAL SIGNS:    T(C): 36.9 (01-15-18 @ 13:31), Max: 37.4 (01-15-18 @ 02:32)  T(F): 98.4 (01-15-18 @ 13:31), Max: 99.4 (01-15-18 @ 02:32)  HR: 103 (01-15-18 @ 13:31) (102 - 130)  BP: 112/60 (01-15-18 @ 13:31) (112/60 - 126/74)  RR: 19 (01-15-18 @ 13:31) (16 - 19)  SpO2: 97% (01-15-18 @ 13:31) (96% - 97%)    PHYSICAL EXAM:    GENERAL: NAD, well-groomed, well-developed  appears to have gained weight since last seen   HEAD:  Atraumatic, Normocephalic  EYES: EOMI, PERRLA, conjunctiva and sclera clear  ENMT:  Moist mucous membranes, Good dentition, No lesions  NECK: Supple, No JVD, Normal thyroid  NERVOUS SYSTEM:  Alert & Oriented X3, Good concentration;   left no tremor ; good motor strength   rt marked tremor   CHEST/LUNG: bilateral rhonchi  to auscultation bilaterally; no wheeze   decreased breath sounds   HEART: Regular rate and rhythm; No murmurs, rubs, or gallops  ABDOMEN: Soft, Nontender, Nondistended; Bowel sounds present  EXTREMITIES:  2+ Peripheral Pulses, No clubbing, cyanosis, or edema  LYMPH: No lymphadenopathy noted  SKIN: No rashes or lesions  BACK: no pressor sore     LABS:                         14.3   8.1   )-----------( 257      ( 15 Cuate 2018 08:01 )             44.6     01-15    139  |  105  |  14  ----------------------------<  115<H>  3.7   |  25  |  0.83    Ca    8.0<L>      15 Cuate 2018 08:01    TPro  7.6  /  Alb  4.0  /  TBili  0.7  /  DBili  x   /  AST  15  /  ALT  10<L>  /  AlkPhos  94  01-15    LIVER FUNCTIONS - ( 15 Cuate 2018 04:28 )  Alb: 4.0 g/dL / Pro: 7.6 gm/dL / ALK PHOS: 94 U/L / ALT: 10 U/L / AST: 15 U/L / GGT: x                                                 Radiology:    < from: CT Angio Chest w/ IV Cont (12.21.17 @ 21:58) >  MPRESSION:     No evidence of a pulmonary embolism. Distal branches are not well   evaluated due to motion.    Right chest wall device with leads extending superiorly along the right   neck                DESMOND DIALLO M.D., ATTENDING RADIOLOGIST  This document has been electronically signed. Dec 21 2017 10:32PM          < from: Xray Chest 1 View AP/PA. (01.15.18 @ 04:32) >  ROCEDURE DATE:  01/15/2018          INTERPRETATION:  cough    A frontal chest film  demonstrates grossly clear lungs.  No acute   infiltrate or consolidation is  noted. The costophrenic anglesare   grossly clear.    The heart size and vascular markings are within normal limits for   technique.      Neurostimulator device overlies the right chest wall. .     The osseous structures appear intact intact.     IMPRESSION:  Clear  lungs.  No acute airspace disease suggested.                ROSA ISELA MENDOZA M.D., ATTENDING RADIOLOGIST  This document has been electronically signed. Cuate 15 2018  8:39AM                < end of copied text >        < end of copied text >

## 2018-01-15 NOTE — ED PROVIDER NOTE - OBJECTIVE STATEMENT
Pertinent PMH/PSH/FHx/SHx and Review of Systems contained within:  36 yo m with PMH of Parkinson's s/p tbs in Nov. and arthritis presents in ED for 2 day history of fever associated with cough with yellowish sputum and confusion. As per wife, patient fever of 103 for which she gave tylenol 6 hours ago and patient also confused so that he did not recognize neighbor. In ED, however patient aox3 and recognizes me from past visits. No aggravating or relieving factors, No chills, No photophobia/eye pain/changes in vision, No ear pain/sore throat/dysphagia, No chest pain/palpitations, no wheeze/stridor, No abdominal pain, No N/V/D, no dysuria/frequency/discharge, No neck/back pain, no rash, no changes in neurological status/function.

## 2018-01-15 NOTE — ED PROVIDER NOTE - MEDICAL DECISION MAKING DETAILS
Labs and imaging reviewed. pateint received ivfs and abx. patient now admitted to medicine for further management.

## 2018-01-15 NOTE — H&P ADULT - ASSESSMENT
37 year old man with PMH of Parkinson's s/p theta burst stimulation last month and arthritis presents in ED for 2 day history of fever associated with cough with yellowish sputum.  Signs, symptoms, and work up consistent with HCAP as patient recently admitted for procedure.  Patient will require admission for at least 2 midnights as detailed below:    IMPROVE VTE Individual Risk Assessment          RISK                                                          Points    [  ] Previous VTE                                                3    [  ] Thrombophilia                                             2    [  ] Lower limb paralysis                                    2        (unable to hold up >15 seconds)      [  ] Current Cancer                                             2         (within 6 months)    [  ] Immobilization > 24 hrs                              1    [  ] ICU/CCU stay > 24 hours                            1    [  ] Age > 60                                                    1    IMPROVE VTE Score _____0____

## 2018-01-16 DIAGNOSIS — M19.90 UNSPECIFIED OSTEOARTHRITIS, UNSPECIFIED SITE: ICD-10-CM

## 2018-01-16 LAB
CULTURE RESULTS: NO GROWTH — SIGNIFICANT CHANGE UP
GRAM STN FLD: SIGNIFICANT CHANGE UP
LEGIONELLA AG UR QL: NEGATIVE — SIGNIFICANT CHANGE UP
PROCALCITONIN SERPL-MCNC: <0.05 NG/ML — SIGNIFICANT CHANGE UP (ref 0–0.04)
SPECIMEN SOURCE: SIGNIFICANT CHANGE UP
SPECIMEN SOURCE: SIGNIFICANT CHANGE UP

## 2018-01-16 PROCEDURE — 99232 SBSQ HOSP IP/OBS MODERATE 35: CPT

## 2018-01-16 PROCEDURE — 71250 CT THORAX DX C-: CPT | Mod: 26

## 2018-01-16 RX ORDER — CARBIDOPA AND LEVODOPA 25; 100 MG/1; MG/1
2 TABLET ORAL ONCE
Qty: 0 | Refills: 0 | Status: COMPLETED | OUTPATIENT
Start: 2018-01-16 | End: 2018-01-16

## 2018-01-16 RX ORDER — VANCOMYCIN HCL 1 G
1000 VIAL (EA) INTRAVENOUS EVERY 8 HOURS
Qty: 0 | Refills: 0 | Status: DISCONTINUED | OUTPATIENT
Start: 2018-01-16 | End: 2018-01-18

## 2018-01-16 RX ADMIN — CARBIDOPA AND LEVODOPA 2 TABLET(S): 25; 100 TABLET ORAL at 11:04

## 2018-01-16 RX ADMIN — CARBIDOPA AND LEVODOPA 2 TABLET(S): 25; 100 TABLET ORAL at 09:18

## 2018-01-16 RX ADMIN — PIPERACILLIN AND TAZOBACTAM 12.5 GRAM(S): 4; .5 INJECTION, POWDER, LYOPHILIZED, FOR SOLUTION INTRAVENOUS at 13:37

## 2018-01-16 RX ADMIN — PIPERACILLIN AND TAZOBACTAM 12.5 GRAM(S): 4; .5 INJECTION, POWDER, LYOPHILIZED, FOR SOLUTION INTRAVENOUS at 05:54

## 2018-01-16 RX ADMIN — Medication 650 MILLIGRAM(S): at 05:54

## 2018-01-16 RX ADMIN — CARBIDOPA AND LEVODOPA 2 TABLET(S): 25; 100 TABLET ORAL at 22:16

## 2018-01-16 RX ADMIN — ESCITALOPRAM OXALATE 5 MILLIGRAM(S): 10 TABLET, FILM COATED ORAL at 21:48

## 2018-01-16 RX ADMIN — PIPERACILLIN AND TAZOBACTAM 100 GRAM(S): 4; .5 INJECTION, POWDER, LYOPHILIZED, FOR SOLUTION INTRAVENOUS at 21:42

## 2018-01-16 RX ADMIN — Medication 250 MILLIGRAM(S): at 22:17

## 2018-01-16 RX ADMIN — CARBIDOPA AND LEVODOPA 2 TABLET(S): 25; 100 TABLET ORAL at 18:32

## 2018-01-16 RX ADMIN — CARBIDOPA AND LEVODOPA 2 TABLET(S): 25; 100 TABLET ORAL at 15:37

## 2018-01-16 RX ADMIN — QUETIAPINE FUMARATE 25 MILLIGRAM(S): 200 TABLET, FILM COATED ORAL at 21:48

## 2018-01-16 NOTE — PROGRESS NOTE ADULT - SUBJECTIVE AND OBJECTIVE BOX
HPI:  37 year old man with PMH of Parkinson's s/p theta burst stimulation last month and arthritis presents in ED for 2 day history of fever associated with cough with yellowish sputum.  As per chart, patient's family took temperature at home which was 103 F.  Patient states that he was in his usual state of health until he felt like he had a "bad cold" which has gotten progressively worse.    In the ED, prelim read of CXR is RLL PNA, CBC shows left shift, no leukocytosis, lactate normal.  Rapid flu neg. (15 Cuate 2018 06:28)       SUBJECTIVE & OBJECTIVE: Pt seen and examined at bedside.  No acute events.     PHYSICAL EXAM:  T(C): 37 (18 @ 17:18), Max: 38.7 (18 @ 05:39)  HR: 107 (18 @ 17:18) (91 - 125)  BP: 124/79 (18 @ 17:18) (109/58 - 141/66)  RR: 19 (18 @ 17:18) (16 - 19)  SpO2: 96% (18 @ 17:18) (96% - 97%)    GENERAL: NAD, well-groomed, well-developed  HEAD:  Atraumatic, Normocephalic  EYES: EOMI, PERRLA, conjunctiva and sclera clear  ENMT: Moist mucous membranes  NECK: Supple, No JVD  NERVOUS SYSTEM:  Alert & Oriented X3, Motor Strength 5/5 B/L upper and lower extremities; DTRs 2+ intact and symmetric  CHEST/LUNG: Clear to auscultation bilaterally; No rales, rhonchi, wheezing, or rubs  HEART: Regular rate and rhythm; No murmurs, rubs, or gallops  ABDOMEN: Soft, Nontender, Nondistended; Bowel sounds present  EXTREMITIES:  2+ Peripheral Pulses, No clubbing, cyanosis, or edema        MEDICATIONS  (STANDING):  carbidopa/levodopa  25/100 2 Tablet(s) Oral five times a day  carbidopa/levodopa  25/100 2 Tablet(s) Oral once  escitalopram 5 milliGRAM(s) Oral daily  piperacillin/tazobactam IVPB. 3.375 Gram(s) IV Intermittent once  piperacillin/tazobactam IVPB. 3.375 Gram(s) IV Intermittent every 8 hours  QUEtiapine 25 milliGRAM(s) Oral at bedtime  vancomycin  IVPB 1000 milliGRAM(s) IV Intermittent every 12 hours    MEDICATIONS  (PRN):  acetaminophen   Tablet 650 milliGRAM(s) Oral every 6 hours PRN For Temp greater than 38 C (100.4 F)  ALBUTerol/ipratropium for Nebulization 3 milliLiter(s) Nebulizer every 6 hours PRN Shortness of Breath and/or Wheezing  guaiFENesin    Syrup 200 milliGRAM(s) Oral every 6 hours PRN Cough      LABS:                        14.3   8.1   )-----------( 257      ( 15 Cuate 2018 08:01 )             44.6     01-    139  |  105  |  14  ----------------------------<  115<H>  3.7   |  25  |  0.83    Calcium    8.0<L>      15 Cuate 2018 08:01    T Protein  7.6  /  Albumin  4.0  /  Total Bili  0.7  /  Direct Bili  x   /  AST  15  /  ALT  10<L>  /  AlkPhos  94  01-15      Urinalysis Basic - ( 15 Cuate 2018 15:18 )    Color: Yellow / Appearance: Clear / S.015 / pH: x  Gluc: x / Ketone: Negative  / Bili: Negative / Urobili: Negative mg/dL   Blood: x / Protein: Negative mg/dL / Nitrite: Negative   Leuk Esterase: Trace / RBC: 0-2 /HPF / WBC 0-2   Sq Epi: x / Non Sq Epi: Occasional / Bacteria: Few      DVT/GI prophylaxsis   Discussed with patient @ bedside

## 2018-01-16 NOTE — PROGRESS NOTE ADULT - SUBJECTIVE AND OBJECTIVE BOX
37 year old man with PMH of Parkinson's s/p theta burst stimulation last month and arthritis presents in ED for 2 day history of fever associated with cough with yellowish sputum.  As per chart, patient's family took temperature at home which was 103 F.  Patient states that he was in his usual state of health until he felt like he had a "bad cold" which has gotten progressively worse.  In the ED, prelim read of CXR is RLL PNA, CBC shows left shift, no leukocytosis, lactate normal.  Rapid flu neg. (15 Cuate 2018 06:28)      Allergies    cefazolin (Flushing; Rash)    Intolerances        MEDICATIONS  (STANDING):  carbidopa/levodopa  25/100 2 Tablet(s) Oral five times a day  escitalopram 5 milliGRAM(s) Oral daily  piperacillin/tazobactam IVPB. 3.375 Gram(s) IV Intermittent once  piperacillin/tazobactam IVPB. 3.375 Gram(s) IV Intermittent every 8 hours  QUEtiapine 25 milliGRAM(s) Oral at bedtime  vancomycin  IVPB 1000 milliGRAM(s) IV Intermittent every 12 hours    MEDICATIONS  (PRN):  acetaminophen   Tablet 650 milliGRAM(s) Oral every 6 hours PRN For Temp greater than 38 C (100.4 F)  ALBUTerol/ipratropium for Nebulization 3 milliLiter(s) Nebulizer every 6 hours PRN Shortness of Breath and/or Wheezing  guaiFENesin    Syrup 200 milliGRAM(s) Oral every 6 hours PRN Cough      REVIEW OF SYSTEMS:    still coughing       VITAL SIGNS:  T(C): 37 (18 @ 17:18), Max: 38.7 (18 @ 05:39)  T(F): 98.6 (18 @ 17:18), Max: 101.6 (18 @ 05:39)  HR: 107 (18 @ 17:18) (91 - 125)  BP: 124/79 (18 @ 17:18) (109/58 - 141/66)  RR: 19 (18 @ 17:18) (16 - 19)  SpO2: 96% (18 @ 17:18) (96% - 97%)  Wt(kg): --    PHYSICAL EXAM:    GENERAL: not in any distress  HEENT: Neck is supple, normocephalic, atraumatic   CHEST/LUNG: Clear to auscultation bilaterally; No rales, rhonchi, wheezing  HEART: Regular rate and rhythm; No murmurs, rubs, or gallops  ABDOMEN: Soft, Nontender, Nondistended; Bowel sounds present, no rebound   EXTREMITIES:  2+ Peripheral Pulses, No clubbing, cyanosis, or edema  SKIN: No rashes or lesions  BACK: no pressor sore   NERVOUS SYSTEM:  Alert & Oriented X3,   LABS:                         14.3   8.1   )-----------( 257      ( 15 Cuate 2018 08:01 )             44.6     01-15    139  |  105  |  14  ----------------------------<  115<H>  3.7   |  25  |  0.83    Ca    8.0<L>      15 Cuate 2018 08:01    TPro  7.6  /  Alb  4.0  /  TBili  0.7  /  DBili  x   /  AST  15  /  ALT  10<L>  /  AlkPhos  94  01-15    LIVER FUNCTIONS - ( 15 Cuate 2018 04:28 )  Alb: 4.0 g/dL / Pro: 7.6 gm/dL / ALK PHOS: 94 U/L / ALT: 10 U/L / AST: 15 U/L / GGT: x             Urinalysis Basic - ( 15 Cuate 2018 15:18 )    Color: Yellow / Appearance: Clear / S.015 / pH: x  Gluc: x / Ketone: Negative  / Bili: Negative / Urobili: Negative mg/dL   Blood: x / Protein: Negative mg/dL / Nitrite: Negative   Leuk Esterase: Trace / RBC: 0-2 /HPF / WBC 0-2   Sq Epi: x / Non Sq Epi: Occasional / Bacteria: Few                          Culture Results:   Normal Respiratory Shannan present ( @ 00:25)  Culture Results:   No growth (01-15 @ 23:36)  Culture Results:   No growth to date. (01-15 @ 08:58)  Culture Results:   No growth to date. (01-15 @ 08:58)                Radiology:      < from: Xray Chest 1 View AP/PA. (01.15.18 @ 04:32) >  IMPRESSION:  Clear  lungs.  No acute airspace disease suggested.                ROSA ISELA MENDOZA M.D., ATTENDING RADIOLOGIST  This document has been electronically signed. Cuate 15 2018  8:39AM                < end of copied text >  < from: CT Chest No Cont (18 @ 12:06) >  IMPRESSION:     Bilateral lower lobe nodular opacities, possibly infectious or   inflammatory.                    NICOLE BYERS M.D., ATTENDING RADIOLOGIST  This document has been electronically signed. 2018  1:59PM                < end of copied text > 37 year old man with PMH of Parkinson's s/p theta burst stimulation last month and arthritis presents in ED for 2 day history of fever associated with cough with yellowish sputum.  As per chart, patient's family took temperature at home which was 103 F.  Patient states that he was in his usual state of health until he felt like he had a "bad cold" which has gotten progressively worse.  In the ED, prelim read of CXR is RLL PNA, CBC shows left shift, no leukocytosis, lactate normal.  Rapid flu neg. (15 Cuate 2018 06:28)      Allergies    cefazolin (Flushing; Rash)    Intolerances        MEDICATIONS  (STANDING):  carbidopa/levodopa  25/100 2 Tablet(s) Oral five times a day  escitalopram 5 milliGRAM(s) Oral daily  piperacillin/tazobactam IVPB. 3.375 Gram(s) IV Intermittent once  piperacillin/tazobactam IVPB. 3.375 Gram(s) IV Intermittent every 8 hours  QUEtiapine 25 milliGRAM(s) Oral at bedtime  vancomycin  IVPB 1000 milliGRAM(s) IV Intermittent every 12 hours    MEDICATIONS  (PRN):  acetaminophen   Tablet 650 milliGRAM(s) Oral every 6 hours PRN For Temp greater than 38 C (100.4 F)  ALBUTerol/ipratropium for Nebulization 3 milliLiter(s) Nebulizer every 6 hours PRN Shortness of Breath and/or Wheezing  guaiFENesin    Syrup 200 milliGRAM(s) Oral every 6 hours PRN Cough      REVIEW OF SYSTEMS:    still coughing   wants to go home     VITAL SIGNS:  T(C): 37 (18 @ 17:18), Max: 38.7 (18 @ 05:39)  T(F): 98.6 (18 @ 17:18), Max: 101.6 (18 @ 05:39)  HR: 107 (18 @ 17:18) (91 - 125)  BP: 124/79 (18 @ 17:18) (109/58 - 141/66)  RR: 19 (18 @ 17:18) (16 - 19)  SpO2: 96% (18 @ 17:18) (96% - 97%)  Wt(kg): --    PHYSICAL EXAM:    GENERAL: not in any distress  HEENT: Neck is supple, normocephalic, atraumatic   CHEST/LUNG: Clear to auscultation bilaterally; No rales, rhonchi, wheezing  HEART: Regular rate and rhythm; No murmurs, rubs, or gallops  ABDOMEN: Soft, Nontender, Nondistended; Bowel sounds present, no rebound   EXTREMITIES:  2+ Peripheral Pulses, No clubbing, cyanosis, or edema  SKIN: No rashes or lesions  BACK: no pressor sore   NERVOUS SYSTEM:  Alert & Oriented X3,   marked tremor   LABS:                         14.3   8.1   )-----------( 257      ( 15 Cuate 2018 08:01 )             44.6     01-15    139  |  105  |  14  ----------------------------<  115<H>  3.7   |  25  |  0.83    Ca    8.0<L>      15 Cuate 2018 08:01    TPro  7.6  /  Alb  4.0  /  TBili  0.7  /  DBili  x   /  AST  15  /  ALT  10<L>  /  AlkPhos  94  01-15    LIVER FUNCTIONS - ( 15 Cuate 2018 04:28 )  Alb: 4.0 g/dL / Pro: 7.6 gm/dL / ALK PHOS: 94 U/L / ALT: 10 U/L / AST: 15 U/L / GGT: x             Urinalysis Basic - ( 15 Cuate 2018 15:18 )    Color: Yellow / Appearance: Clear / S.015 / pH: x  Gluc: x / Ketone: Negative  / Bili: Negative / Urobili: Negative mg/dL   Blood: x / Protein: Negative mg/dL / Nitrite: Negative   Leuk Esterase: Trace / RBC: 0-2 /HPF / WBC 0-2   Sq Epi: x / Non Sq Epi: Occasional / Bacteria: Few                          Culture Results:   Normal Respiratory Shannan present ( @ 00:25)  Culture Results:   No growth (01-15 @ 23:36)  Culture Results:   No growth to date. (01-15 @ 08:58)  Culture Results:   No growth to date. (01-15 @ 08:58)                Radiology:      < from: Xray Chest 1 View AP/PA. (01.15.18 @ 04:32) >  IMPRESSION:  Clear  lungs.  No acute airspace disease suggested.                ROSA ISELA MENDOZA M.D., ATTENDING RADIOLOGIST  This document has been electronically signed. Cuate 15 2018  8:39AM                < end of copied text >  < from: CT Chest No Cont (18 @ 12:06) >  IMPRESSION:     Bilateral lower lobe nodular opacities, possibly infectious or   inflammatory.                    NICOLE BYERS M.D., ATTENDING RADIOLOGIST  This document has been electronically signed. 2018  1:59PM                < end of copied text >

## 2018-01-16 NOTE — PROGRESS NOTE ADULT - ASSESSMENT
37 year old man with a PMHx notable for Parkinson's s/p theta burst stimulation last month and arthritis presents in ED for 2 day history of fever associated with cough with yellowish sputum.  Signs, symptoms, and work up consistent with HCAP as patient recently admitted for procedure.  Patient will require admission for at least 2 midnights as detailed below:

## 2018-01-16 NOTE — PROGRESS NOTE ADULT - ASSESSMENT
37 year old man with PMH of Parkinson's s/p theta burst stimulation last month and arthritis presents in ED for 2 day history of fever associated with cough with yellowish sputum.  Signs, symptoms, and work up consistent with HCAP   procalcitonin is low   ct chest with bilateral nodular opacities 37 year old man with PMH of Parkinson's s/p theta burst stimulation last month and arthritis presents in ED for 2 day history of fever associated with cough with yellowish sputum.  Signs, symptoms, and work up consistent with HCAP ?? xray NEGATIVE ct pos   procalcitonin is low   ct chest with bilateral nodular opacities

## 2018-01-17 LAB
CULTURE RESULTS: SIGNIFICANT CHANGE UP
GRAM STN FLD: SIGNIFICANT CHANGE UP
M PNEUMO IGM SER-ACNC: 37 UNITS/ML — SIGNIFICANT CHANGE UP
MYCOPLASMA AG SPEC QL: NEGATIVE — SIGNIFICANT CHANGE UP
SPECIMEN SOURCE: SIGNIFICANT CHANGE UP

## 2018-01-17 PROCEDURE — 99232 SBSQ HOSP IP/OBS MODERATE 35: CPT

## 2018-01-17 RX ADMIN — Medication 250 MILLIGRAM(S): at 22:02

## 2018-01-17 RX ADMIN — CARBIDOPA AND LEVODOPA 2 TABLET(S): 25; 100 TABLET ORAL at 04:34

## 2018-01-17 RX ADMIN — PIPERACILLIN AND TAZOBACTAM 12.5 GRAM(S): 4; .5 INJECTION, POWDER, LYOPHILIZED, FOR SOLUTION INTRAVENOUS at 05:59

## 2018-01-17 RX ADMIN — PIPERACILLIN AND TAZOBACTAM 12.5 GRAM(S): 4; .5 INJECTION, POWDER, LYOPHILIZED, FOR SOLUTION INTRAVENOUS at 22:03

## 2018-01-17 RX ADMIN — PIPERACILLIN AND TAZOBACTAM 12.5 GRAM(S): 4; .5 INJECTION, POWDER, LYOPHILIZED, FOR SOLUTION INTRAVENOUS at 13:39

## 2018-01-17 RX ADMIN — CARBIDOPA AND LEVODOPA 2 TABLET(S): 25; 100 TABLET ORAL at 17:22

## 2018-01-17 RX ADMIN — Medication 250 MILLIGRAM(S): at 15:28

## 2018-01-17 RX ADMIN — QUETIAPINE FUMARATE 25 MILLIGRAM(S): 200 TABLET, FILM COATED ORAL at 23:06

## 2018-01-17 RX ADMIN — Medication 250 MILLIGRAM(S): at 05:03

## 2018-01-17 RX ADMIN — CARBIDOPA AND LEVODOPA 2 TABLET(S): 25; 100 TABLET ORAL at 22:02

## 2018-01-17 RX ADMIN — CARBIDOPA AND LEVODOPA 2 TABLET(S): 25; 100 TABLET ORAL at 08:10

## 2018-01-17 RX ADMIN — ESCITALOPRAM OXALATE 5 MILLIGRAM(S): 10 TABLET, FILM COATED ORAL at 22:03

## 2018-01-17 NOTE — PROGRESS NOTE ADULT - PROBLEM SELECTOR PROBLEM 1
HCAP (healthcare-associated pneumonia)
HCAP (healthcare-associated pneumonia)
PNA (pneumonia)
PNA (pneumonia)

## 2018-01-17 NOTE — PROGRESS NOTE ADULT - SUBJECTIVE AND OBJECTIVE BOX
37 year old man with a PMHx notable for Parkinson's s/p theta burst stimulation last month and arthritis presents in ED for 2 day history of fever associated with cough with yellowish sputum.  As per chart, patient's family took temperature at home which was 103 F.  Patient states that he was in his usual state of health until he felt like he had a "bad cold" which has gotten progressively worse.  In the ED, prelim read of CXR is RLL PNA, CBC shows left shift, no leukocytosis, lactate normal.  Rapid flu neg. (15 Cuate 2018 06:28)  feels better  Allergies    cefazolin (Flushing; Rash)    Intolerances        MEDICATIONS  (STANDING):  carbidopa/levodopa  25/100 2 Tablet(s) Oral five times a day  escitalopram 5 milliGRAM(s) Oral daily  piperacillin/tazobactam IVPB. 3.375 Gram(s) IV Intermittent every 8 hours  QUEtiapine 25 milliGRAM(s) Oral at bedtime  vancomycin  IVPB 1000 milliGRAM(s) IV Intermittent every 8 hours    MEDICATIONS  (PRN):  acetaminophen   Tablet 650 milliGRAM(s) Oral every 6 hours PRN For Temp greater than 38 C (100.4 F)  ALBUTerol/ipratropium for Nebulization 3 milliLiter(s) Nebulizer every 6 hours PRN Shortness of Breath and/or Wheezing  guaiFENesin    Syrup 200 milliGRAM(s) Oral every 6 hours PRN Cough      REVIEW OF SYSTEMS:    one side tremors  still coughing   is better though    VITAL SIGNS:  T(C): 36.6 (01-17-18 @ 16:58), Max: 37.1 (01-17-18 @ 00:12)  T(F): 97.9 (01-17-18 @ 16:58), Max: 98.8 (01-17-18 @ 00:12)  HR: 89 (01-17-18 @ 16:58) (86 - 105)  BP: 113/65 (01-17-18 @ 16:58) (112/67 - 137/72)  RR: 20 (01-17-18 @ 16:58) (17 - 20)  SpO2: 96% (01-17-18 @ 16:58) (95% - 97%)  Wt(kg): --    PHYSICAL EXAM:    GENERAL: not in any distress  HEENT: Neck is supple, normocephalic, atraumatic   CHEST/LUNG: rhonnchi  to auscultation bilaterally;   HEART: Regular rate and rhythm; No murmurs, rubs, or gallops  ABDOMEN: Soft, Nontender, Nondistended; Bowel sounds present, no rebound   EXTREMITIES:  2+ Peripheral Pulses, No clubbing, cyanosis, or edema  SKIN: No rashes or lesions  BACK: no pressor sore   NERVOUS SYSTEM:  Alert & Oriented X3,   LABS:                                     Culture Results:   Normal Respiratory Shannan present (01-16 @ 00:25)  Culture Results:   No growth (01-15 @ 23:36)  Culture Results:   No growth to date. (01-15 @ 08:58)  Culture Results:   No growth to date. (01-15 @ 08:58)          Legionella Antigen, Urine: Negative (01-15 @ 23:55)        Radiology:

## 2018-01-17 NOTE — PROGRESS NOTE ADULT - PROBLEM SELECTOR PLAN 1
Appreciate ID eval  CT chest 1/16: bilateral lower lobe nodular opacities, possibly infectious or inflamm.   c/w Vanco and Zosyn   O2 via NC@ 2L/Min, keep sat >94% at all times.  Blood Cx 2 sets NGTD  urine legionella antigen negative  Albuterol/Atrovent 1 unit neb Q6hrs.  Tylenol 650mg po q6hrs PRN for fever.  Robitussin 10ml PO Q8hrs PRN for cough.
Appreciate ID eval  CT chest 1/16: bilateral lower lobe nodular opacities, possibly infectious or inflamm.   c/w Vanco and Zosyn (start date 1/15)  O2 via NC@ 2L/Min, keep sat >94% at all times.  Blood Cx 2 sets NGTD  urine legionella antigen negative  Albuterol/Atrovent 1 unit neb Q6hrs.  Tylenol 650mg po q6hrs PRN for fever.  Robitussin 10ml PO Q8hrs PRN for cough.  would continue IV abx for another 24 hours

## 2018-01-17 NOTE — PROGRESS NOTE ADULT - PROBLEM SELECTOR PLAN 3
DVT Prophylaxis-early ambulation  General precautions   Will notify Dr. Marina of admission
DVT Prophylaxis-early ambulation/SQ lovenox daily   General precautions

## 2018-01-17 NOTE — PROGRESS NOTE ADULT - PROBLEM SELECTOR PLAN 2
Continue carbidopa-levodopa, pramipexole, entacapone.
Continue carbidopa-levodopa, pramipexole, entacapone.

## 2018-01-17 NOTE — PROGRESS NOTE ADULT - SUBJECTIVE AND OBJECTIVE BOX
HPI:  37 year old man with a PMHx notable for Parkinson's s/p theta burst stimulation last month and arthritis presents in ED for 2 day history of fever associated with cough with yellowish sputum.  As per chart, patient's family took temperature at home which was 103 F.  Patient states that he was in his usual state of health until he felt like he had a "bad cold" which has gotten progressively worse.    In the ED, prelim read of CXR is RLL PNA, CBC shows left shift, no leukocytosis, lactate normal.  Rapid flu neg. (15 Cuate 2018 06:28)       SUBJECTIVE & OBJECTIVE: Pt seen and examined at bedside.  Afebrile.      PHYSICAL EXAM:  T(C): 36.2 (18 @ 11:20), Max: 37.1 (18 @ 00:12)  HR: 95 (18 @ 11:20) (86 - 107)  BP: 137/72 (18 @ 11:20) (112/67 - 137/72)  RR: 18 (18 @ 11:20) (17 - 19)  SpO2: 95% (18 @ 11:20) (95% - 97%)    GENERAL: NAD, well-groomed, well-developed  HEAD:  Atraumatic, Normocephalic  EYES: EOMI, PERRLA, conjunctiva and sclera clear  ENMT: Moist mucous membranes  NECK: Supple, No JVD  NERVOUS SYSTEM:  Alert & Oriented X3, Motor Strength 5/5 B/L upper and lower extremities; DTRs 2+ intact and symmetric  CHEST/LUNG: Clear to auscultation bilaterally; No rales, rhonchi, wheezing, or rubs  HEART: Regular rate and rhythm; No murmurs, rubs, or gallops  ABDOMEN: Soft, Nontender, Nondistended; Bowel sounds present  EXTREMITIES:  2+ Peripheral Pulses, No clubbing, cyanosis, or edema        MEDICATIONS  (STANDING):  carbidopa/levodopa  25/100 2 Tablet(s) Oral five times a day  escitalopram 5 milliGRAM(s) Oral daily  piperacillin/tazobactam IVPB. 3.375 Gram(s) IV Intermittent every 8 hours  QUEtiapine 25 milliGRAM(s) Oral at bedtime  vancomycin  IVPB 1000 milliGRAM(s) IV Intermittent every 8 hours    MEDICATIONS  (PRN):  acetaminophen   Tablet 650 milliGRAM(s) Oral every 6 hours PRN For Temp greater than 38 C (100.4 F)  ALBUTerol/ipratropium for Nebulization 3 milliLiter(s) Nebulizer every 6 hours PRN Shortness of Breath and/or Wheezing  guaiFENesin    Syrup 200 milliGRAM(s) Oral every 6 hours PRN Cough      LABS:  None    Urinalysis Basic - ( 15 Cuate 2018 15:18 )    Color: Yellow / Appearance: Clear / S.015 / pH: x  Gluc: x / Ketone: Negative  / Bili: Negative / Urobili: Negative mg/dL   Blood: x / Protein: Negative mg/dL / Nitrite: Negative   Leuk Esterase: Trace / RBC: 0-2 /HPF / WBC 0-2   Sq Epi: x / Non Sq Epi: Occasional / Bacteria: Few    DVT/GI prophylaxsis  Discussed with patient @ bedside

## 2018-01-18 ENCOUNTER — TRANSCRIPTION ENCOUNTER (OUTPATIENT)
Age: 38
End: 2018-01-18

## 2018-01-18 VITALS
TEMPERATURE: 98 F | RESPIRATION RATE: 17 BRPM | HEART RATE: 78 BPM | DIASTOLIC BLOOD PRESSURE: 61 MMHG | OXYGEN SATURATION: 96 % | SYSTOLIC BLOOD PRESSURE: 113 MMHG

## 2018-01-18 LAB
ALBUMIN SERPL ELPH-MCNC: 3.5 G/DL — SIGNIFICANT CHANGE UP (ref 3.3–5)
ALP SERPL-CCNC: 72 U/L — SIGNIFICANT CHANGE UP (ref 40–120)
ALT FLD-CCNC: 11 U/L — LOW (ref 12–78)
ANION GAP SERPL CALC-SCNC: 8 MMOL/L — SIGNIFICANT CHANGE UP (ref 5–17)
AST SERPL-CCNC: 12 U/L — LOW (ref 15–37)
BILIRUB SERPL-MCNC: 0.8 MG/DL — SIGNIFICANT CHANGE UP (ref 0.2–1.2)
BUN SERPL-MCNC: 12 MG/DL — SIGNIFICANT CHANGE UP (ref 7–23)
CALCIUM SERPL-MCNC: 8.4 MG/DL — LOW (ref 8.5–10.1)
CHLORIDE SERPL-SCNC: 103 MMOL/L — SIGNIFICANT CHANGE UP (ref 96–108)
CO2 SERPL-SCNC: 27 MMOL/L — SIGNIFICANT CHANGE UP (ref 22–31)
CREAT SERPL-MCNC: 0.78 MG/DL — SIGNIFICANT CHANGE UP (ref 0.5–1.3)
GLUCOSE SERPL-MCNC: 98 MG/DL — SIGNIFICANT CHANGE UP (ref 70–99)
HCT VFR BLD CALC: 44 % — SIGNIFICANT CHANGE UP (ref 39–50)
HGB BLD-MCNC: 14.6 G/DL — SIGNIFICANT CHANGE UP (ref 13–17)
MCHC RBC-ENTMCNC: 27.5 PG — SIGNIFICANT CHANGE UP (ref 27–34)
MCHC RBC-ENTMCNC: 33.2 GM/DL — SIGNIFICANT CHANGE UP (ref 32–36)
MCV RBC AUTO: 82.9 FL — SIGNIFICANT CHANGE UP (ref 80–100)
PLATELET # BLD AUTO: 262 K/UL — SIGNIFICANT CHANGE UP (ref 150–400)
POTASSIUM SERPL-MCNC: 3.6 MMOL/L — SIGNIFICANT CHANGE UP (ref 3.5–5.3)
POTASSIUM SERPL-SCNC: 3.6 MMOL/L — SIGNIFICANT CHANGE UP (ref 3.5–5.3)
PROCALCITONIN SERPL-MCNC: <0.05 NG/ML — SIGNIFICANT CHANGE UP (ref 0–0.04)
PROT SERPL-MCNC: 7.1 GM/DL — SIGNIFICANT CHANGE UP (ref 6–8.3)
RBC # BLD: 5.31 M/UL — SIGNIFICANT CHANGE UP (ref 4.2–5.8)
RBC # FLD: 11.6 % — SIGNIFICANT CHANGE UP (ref 11–15)
SODIUM SERPL-SCNC: 138 MMOL/L — SIGNIFICANT CHANGE UP (ref 135–145)
VANCOMYCIN TROUGH SERPL-MCNC: 14.5 UG/ML — SIGNIFICANT CHANGE UP (ref 10–20)
WBC # BLD: 5.6 K/UL — SIGNIFICANT CHANGE UP (ref 3.8–10.5)
WBC # FLD AUTO: 5.6 K/UL — SIGNIFICANT CHANGE UP (ref 3.8–10.5)

## 2018-01-18 PROCEDURE — 99239 HOSP IP/OBS DSCHRG MGMT >30: CPT

## 2018-01-18 RX ORDER — ACETAMINOPHEN 500 MG
2 TABLET ORAL
Qty: 0 | Refills: 0 | COMMUNITY
Start: 2018-01-18

## 2018-01-18 RX ORDER — ALBUTEROL 90 UG/1
2 AEROSOL, METERED ORAL
Qty: 1 | Refills: 0 | OUTPATIENT
Start: 2018-01-18 | End: 2018-02-16

## 2018-01-18 RX ADMIN — PIPERACILLIN AND TAZOBACTAM 12.5 GRAM(S): 4; .5 INJECTION, POWDER, LYOPHILIZED, FOR SOLUTION INTRAVENOUS at 05:38

## 2018-01-18 RX ADMIN — CARBIDOPA AND LEVODOPA 2 TABLET(S): 25; 100 TABLET ORAL at 08:27

## 2018-01-18 RX ADMIN — Medication 250 MILLIGRAM(S): at 15:09

## 2018-01-18 RX ADMIN — CARBIDOPA AND LEVODOPA 2 TABLET(S): 25; 100 TABLET ORAL at 15:14

## 2018-01-18 RX ADMIN — PIPERACILLIN AND TAZOBACTAM 12.5 GRAM(S): 4; .5 INJECTION, POWDER, LYOPHILIZED, FOR SOLUTION INTRAVENOUS at 12:17

## 2018-01-18 RX ADMIN — Medication 250 MILLIGRAM(S): at 05:38

## 2018-01-18 NOTE — DISCHARGE NOTE ADULT - HOSPITAL COURSE
37 year old man with a PMHx notable for Parkinson's s/p theta burst stimulation last month and arthritis presents in ED for 2 day history of fever associated with cough with yellowish sputum.  Signs, symptoms, and work up consistent with HCAP as patient recently admitted for procedure.  Patient was started on antibiotics and clinically improved and is stable for discharge to complete antibiotics at home.          Problem/Plan - 1:  ·  Problem: HCAP (healthcare-associated pneumonia).  Plan:c/w 5 more days of oral antibiotcs .  Blood Cx 2 sets NGTD  urine legionella antigen negative  Albuterol/Atrovent 1 unit neb Q6hrs.  Tylenol 650mg po q6hrs PRN for fever.  Robitussin 10ml PO Q8hrs PRN for cough.       Problem/Plan - 2:  ·  Problem: Parkinson disease.  Plan: Continue carbidopa-levodopa, pramipexole, entacapone.

## 2018-01-18 NOTE — DISCHARGE NOTE ADULT - CARE PLAN
Principal Discharge DX:	PNA (pneumonia)  Goal:	c/w antibiotics  Assessment and plan of treatment:	drink plenty of fluids  Secondary Diagnosis:	Parkinson disease  Goal:	c/w medications

## 2018-01-18 NOTE — DISCHARGE NOTE ADULT - MEDICATION SUMMARY - MEDICATIONS TO TAKE
I will START or STAY ON the medications listed below when I get home from the hospital:    acetaminophen 325 mg oral tablet  -- 2 tab(s) by mouth every 6 hours, As needed, For Temp greater than 38 C (100.4 F)  -- Indication: For fever     escitalopram 5 mg oral tablet  -- 1 tab(s) by mouth once a day  -- Indication: For depression     carbidopa-levodopa 25 mg-100 mg oral tablet  -- 2 tab(s) by mouth 5 times a day  -- Indication: For Parkinson disease    QUEtiapine 25 mg oral tablet  -- 1 tab(s) by mouth once a day (at bedtime)  -- Indication: For sleep    Ventolin HFA 90 mcg/inh inhalation aerosol  -- 2 puff(s) inhaled every 4 hours as needed for shortness of breath  -- For inhalation only.  It is very important that you take or use this exactly as directed.  Do not skip doses or discontinue unless directed by your doctor.  Obtain medical advice before taking any non-prescription drugs as some may affect the action of this medication.  Shake well before use.    -- Indication: For Asthma    guaiFENesin 100 mg/5 mL oral liquid  -- 10 milliliter(s) by mouth every 6 hours, As needed, Cough  -- Indication: For cough    amoxicillin-clavulanate 875 mg-125 mg oral tablet  -- 875 milligram(s) by mouth every 12 hours   -- Finish all this medication unless otherwise directed by prescriber.  Take with food or milk.    -- Indication: For PNEUMONIA

## 2018-01-22 DIAGNOSIS — R50.81 FEVER PRESENTING WITH CONDITIONS CLASSIFIED ELSEWHERE: ICD-10-CM

## 2018-01-22 DIAGNOSIS — Z79.51 LONG TERM (CURRENT) USE OF INHALED STEROIDS: ICD-10-CM

## 2018-01-22 DIAGNOSIS — J18.9 PNEUMONIA, UNSPECIFIED ORGANISM: ICD-10-CM
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
## 2018-01-22 DIAGNOSIS — Z88.1 ALLERGY STATUS TO OTHER ANTIBIOTIC AGENTS STATUS: ICD-10-CM

## 2018-01-22 DIAGNOSIS — M19.90 UNSPECIFIED OSTEOARTHRITIS, UNSPECIFIED SITE: ICD-10-CM

## 2018-01-22 DIAGNOSIS — G20 PARKINSON'S DISEASE: ICD-10-CM

## 2018-01-22 DIAGNOSIS — J20.9 ACUTE BRONCHITIS, UNSPECIFIED: ICD-10-CM

## 2018-02-14 ENCOUNTER — EMERGENCY (EMERGENCY)
Facility: HOSPITAL | Age: 38
LOS: 0 days | Discharge: HOME | End: 2018-02-14
Attending: EMERGENCY MEDICINE
Payer: MEDICARE

## 2018-02-14 VITALS
OXYGEN SATURATION: 100 % | RESPIRATION RATE: 21 BRPM | TEMPERATURE: 98 F | DIASTOLIC BLOOD PRESSURE: 66 MMHG | HEART RATE: 76 BPM | SYSTOLIC BLOOD PRESSURE: 119 MMHG

## 2018-02-14 VITALS — RESPIRATION RATE: 18 BRPM | HEART RATE: 61 BPM | TEMPERATURE: 98 F | OXYGEN SATURATION: 98 %

## 2018-02-14 DIAGNOSIS — M13.80 OTHER SPECIFIED ARTHRITIS, UNSPECIFIED SITE: ICD-10-CM

## 2018-02-14 DIAGNOSIS — R06.89 OTHER ABNORMALITIES OF BREATHING: ICD-10-CM

## 2018-02-14 DIAGNOSIS — R05 COUGH: ICD-10-CM

## 2018-02-14 DIAGNOSIS — Z98.890 OTHER SPECIFIED POSTPROCEDURAL STATES: Chronic | ICD-10-CM

## 2018-02-14 DIAGNOSIS — G20 PARKINSON'S DISEASE: ICD-10-CM

## 2018-02-14 DIAGNOSIS — Z95.810 PRESENCE OF AUTOMATIC (IMPLANTABLE) CARDIAC DEFIBRILLATOR: ICD-10-CM

## 2018-02-14 DIAGNOSIS — Z79.2 LONG TERM (CURRENT) USE OF ANTIBIOTICS: ICD-10-CM

## 2018-02-14 DIAGNOSIS — Z79.51 LONG TERM (CURRENT) USE OF INHALED STEROIDS: ICD-10-CM

## 2018-02-14 DIAGNOSIS — Z87.01 PERSONAL HISTORY OF PNEUMONIA (RECURRENT): ICD-10-CM

## 2018-02-14 DIAGNOSIS — R53.1 WEAKNESS: ICD-10-CM

## 2018-02-14 DIAGNOSIS — Z91.14 PATIENT'S OTHER NONCOMPLIANCE WITH MEDICATION REGIMEN: ICD-10-CM

## 2018-02-14 LAB
ALBUMIN SERPL ELPH-MCNC: 3.6 G/DL — SIGNIFICANT CHANGE UP (ref 3.3–5)
ALP SERPL-CCNC: 85 U/L — SIGNIFICANT CHANGE UP (ref 40–120)
ALT FLD-CCNC: 14 U/L — SIGNIFICANT CHANGE UP (ref 12–78)
ANION GAP SERPL CALC-SCNC: 5 MMOL/L — SIGNIFICANT CHANGE UP (ref 5–17)
APPEARANCE UR: CLEAR — SIGNIFICANT CHANGE UP
APTT BLD: 43.2 SEC — HIGH (ref 27.5–37.4)
AST SERPL-CCNC: 17 U/L — SIGNIFICANT CHANGE UP (ref 15–37)
BASE EXCESS BLDA CALC-SCNC: -0.2 MMOL/L — SIGNIFICANT CHANGE UP (ref -2–2)
BASOPHILS # BLD AUTO: 0.05 K/UL — SIGNIFICANT CHANGE UP (ref 0–0.2)
BASOPHILS NFR BLD AUTO: 0.9 % — SIGNIFICANT CHANGE UP (ref 0–2)
BILIRUB SERPL-MCNC: 0.6 MG/DL — SIGNIFICANT CHANGE UP (ref 0.2–1.2)
BILIRUB UR-MCNC: NEGATIVE — SIGNIFICANT CHANGE UP
BLOOD GAS COMMENTS: SIGNIFICANT CHANGE UP
BLOOD GAS SOURCE: SIGNIFICANT CHANGE UP
BUN SERPL-MCNC: 17 MG/DL — SIGNIFICANT CHANGE UP (ref 7–23)
CALCIUM SERPL-MCNC: 8.6 MG/DL — SIGNIFICANT CHANGE UP (ref 8.5–10.1)
CHLORIDE SERPL-SCNC: 107 MMOL/L — SIGNIFICANT CHANGE UP (ref 96–108)
CO2 SERPL-SCNC: 29 MMOL/L — SIGNIFICANT CHANGE UP (ref 22–31)
COLOR SPEC: YELLOW — SIGNIFICANT CHANGE UP
CREAT SERPL-MCNC: 0.75 MG/DL — SIGNIFICANT CHANGE UP (ref 0.5–1.3)
DIFF PNL FLD: NEGATIVE — SIGNIFICANT CHANGE UP
EOSINOPHIL # BLD AUTO: 0.47 K/UL — SIGNIFICANT CHANGE UP (ref 0–0.5)
EOSINOPHIL NFR BLD AUTO: 8.1 % — HIGH (ref 0–6)
FLUAV SPEC QL CULT: NEGATIVE — SIGNIFICANT CHANGE UP
FLUBV AG SPEC QL IA: NEGATIVE — SIGNIFICANT CHANGE UP
GLUCOSE BLDC GLUCOMTR-MCNC: 103 MG/DL — HIGH (ref 70–99)
GLUCOSE SERPL-MCNC: 99 MG/DL — SIGNIFICANT CHANGE UP (ref 70–99)
GLUCOSE UR QL: NEGATIVE MG/DL — SIGNIFICANT CHANGE UP
HCO3 BLDA-SCNC: 26 MMOL/L — SIGNIFICANT CHANGE UP (ref 21–29)
HCT VFR BLD CALC: 44.4 % — SIGNIFICANT CHANGE UP (ref 39–50)
HGB BLD-MCNC: 13.9 G/DL — SIGNIFICANT CHANGE UP (ref 13–17)
HOROWITZ INDEX BLDA+IHG-RTO: 28 — SIGNIFICANT CHANGE UP
IMM GRANULOCYTES NFR BLD AUTO: 0.7 % — SIGNIFICANT CHANGE UP (ref 0–1.5)
INR BLD: 1.07 RATIO — SIGNIFICANT CHANGE UP (ref 0.88–1.16)
KETONES UR-MCNC: NEGATIVE — SIGNIFICANT CHANGE UP
LACTATE SERPL-SCNC: 1 MMOL/L — SIGNIFICANT CHANGE UP (ref 0.7–2)
LEUKOCYTE ESTERASE UR-ACNC: NEGATIVE — SIGNIFICANT CHANGE UP
LYMPHOCYTES # BLD AUTO: 2.01 K/UL — SIGNIFICANT CHANGE UP (ref 1–3.3)
LYMPHOCYTES # BLD AUTO: 34.6 % — SIGNIFICANT CHANGE UP (ref 13–44)
MCHC RBC-ENTMCNC: 25.6 PG — LOW (ref 27–34)
MCHC RBC-ENTMCNC: 31.3 GM/DL — LOW (ref 32–36)
MCV RBC AUTO: 81.8 FL — SIGNIFICANT CHANGE UP (ref 80–100)
MONOCYTES # BLD AUTO: 0.49 K/UL — SIGNIFICANT CHANGE UP (ref 0–0.9)
MONOCYTES NFR BLD AUTO: 8.4 % — SIGNIFICANT CHANGE UP (ref 2–14)
NEUTROPHILS # BLD AUTO: 2.75 K/UL — SIGNIFICANT CHANGE UP (ref 1.8–7.4)
NEUTROPHILS NFR BLD AUTO: 47.3 % — SIGNIFICANT CHANGE UP (ref 43–77)
NITRITE UR-MCNC: NEGATIVE — SIGNIFICANT CHANGE UP
NRBC # BLD: 0 /100 WBCS — SIGNIFICANT CHANGE UP (ref 0–0)
PCO2 BLDA: 48 MMHG — HIGH (ref 32–46)
PH BLD: 7.35 — SIGNIFICANT CHANGE UP (ref 7.35–7.45)
PH UR: 6 — SIGNIFICANT CHANGE UP (ref 5–8)
PLATELET # BLD AUTO: 292 K/UL — SIGNIFICANT CHANGE UP (ref 150–400)
PO2 BLDA: 149 MMHG — HIGH (ref 74–108)
POTASSIUM SERPL-MCNC: 4.2 MMOL/L — SIGNIFICANT CHANGE UP (ref 3.5–5.3)
POTASSIUM SERPL-SCNC: 4.2 MMOL/L — SIGNIFICANT CHANGE UP (ref 3.5–5.3)
PROT SERPL-MCNC: 7 GM/DL — SIGNIFICANT CHANGE UP (ref 6–8.3)
PROT UR-MCNC: NEGATIVE MG/DL — SIGNIFICANT CHANGE UP
PROTHROM AB SERPL-ACNC: 11.7 SEC — SIGNIFICANT CHANGE UP (ref 9.8–12.7)
RBC # BLD: 5.43 M/UL — SIGNIFICANT CHANGE UP (ref 4.2–5.8)
RBC # FLD: 14.2 % — SIGNIFICANT CHANGE UP (ref 10.3–14.5)
SAO2 % BLDA: 99 % — HIGH (ref 92–96)
SODIUM SERPL-SCNC: 141 MMOL/L — SIGNIFICANT CHANGE UP (ref 135–145)
SP GR SPEC: 1.02 — SIGNIFICANT CHANGE UP (ref 1.01–1.02)
UROBILINOGEN FLD QL: NEGATIVE MG/DL — SIGNIFICANT CHANGE UP
WBC # BLD: 5.81 K/UL — SIGNIFICANT CHANGE UP (ref 3.8–10.5)
WBC # FLD AUTO: 5.81 K/UL — SIGNIFICANT CHANGE UP (ref 3.8–10.5)

## 2018-02-14 PROCEDURE — 99284 EMERGENCY DEPT VISIT MOD MDM: CPT

## 2018-02-14 PROCEDURE — 71045 X-RAY EXAM CHEST 1 VIEW: CPT | Mod: 26

## 2018-02-14 RX ORDER — SODIUM CHLORIDE 9 MG/ML
2000 INJECTION INTRAMUSCULAR; INTRAVENOUS; SUBCUTANEOUS ONCE
Qty: 0 | Refills: 0 | Status: COMPLETED | OUTPATIENT
Start: 2018-02-14 | End: 2018-02-14

## 2018-02-14 RX ORDER — ALBUTEROL 90 UG/1
2 AEROSOL, METERED ORAL
Qty: 1 | Refills: 0 | OUTPATIENT
Start: 2018-02-14 | End: 2018-03-15

## 2018-02-14 RX ADMIN — SODIUM CHLORIDE 2000 MILLILITER(S): 9 INJECTION INTRAMUSCULAR; INTRAVENOUS; SUBCUTANEOUS at 12:15

## 2018-02-14 NOTE — ED PROVIDER NOTE - OBJECTIVE STATEMENT
37 years old male here c/o generalized weakness chill worsening coughing for two days 37 years old male here c/o generalized weakness chill worsening coughing for two days. Pt sts he was recently admitted for pneumonia and discharged with oral antibiotics but he did not take it. Pt also sts he has a hx of Parkinson disease with a electro generator to the right chest and battery in his pocket.

## 2018-02-14 NOTE — ED ADULT NURSE NOTE - OBJECTIVE STATEMENT
patient received, alert and oriented x4, came here for difficulty breathing x 2 days, stated was here for pneumonia 2 weeks ago and admitted for 4 days. denies fever/chills. denies n/v/d.

## 2018-02-14 NOTE — ED ADULT TRIAGE NOTE - CHIEF COMPLAINT QUOTE
pt states " I cant breath and I have a bad cough." pt recently treated for pna. pt states " I cant breath  through my nose and I have a bad cough." pt recently treated for pna.

## 2018-02-14 NOTE — ED PROVIDER NOTE - CONSTITUTIONAL, MLM
normal... Well appearing, well nourished, awake, alert, oriented to person, place, time/situation and in no apparent distress. speaking in clear full sentences no nasal flaring no shoulders retractions no diaphoresis

## 2018-02-14 NOTE — ED PROVIDER NOTE - PROGRESS NOTE DETAILS
Pt is smiling gave me high five with right hand sts he is feeling much better now after iv fluid. Pt sts he has all his medications at home does not need prescriptions and he is calling his wife to come to pick him up. Pt is given and explained all test reports and advised to take all his medications including oral antiboitic and follow up with his pmd as soon as possible.

## 2018-02-15 LAB
CULTURE RESULTS: NO GROWTH — SIGNIFICANT CHANGE UP
SPECIMEN SOURCE: SIGNIFICANT CHANGE UP

## 2018-08-20 ENCOUNTER — INPATIENT (INPATIENT)
Facility: HOSPITAL | Age: 38
LOS: 2 days | Discharge: HOME | End: 2018-08-23
Attending: HOSPITALIST | Admitting: HOSPITALIST
Payer: MEDICARE

## 2018-08-20 VITALS
TEMPERATURE: 97 F | RESPIRATION RATE: 20 BRPM | WEIGHT: 179.9 LBS | DIASTOLIC BLOOD PRESSURE: 78 MMHG | SYSTOLIC BLOOD PRESSURE: 138 MMHG | HEART RATE: 81 BPM | OXYGEN SATURATION: 97 % | HEIGHT: 67 IN

## 2018-08-20 DIAGNOSIS — Z98.890 OTHER SPECIFIED POSTPROCEDURAL STATES: Chronic | ICD-10-CM

## 2018-08-20 PROCEDURE — 99285 EMERGENCY DEPT VISIT HI MDM: CPT

## 2018-08-20 NOTE — ED ADULT TRIAGE NOTE - CHIEF COMPLAINT QUOTE
Pt presents w right calf wound that just resulted as +MRSA. Was given PO doxy. However, pt has a brain implant and is at high risk for sepsis due to this. Was referred to ER by brain surgeon for iv abx to prevent sepsis.

## 2018-08-21 DIAGNOSIS — T14.8XXA OTHER INJURY OF UNSPECIFIED BODY REGION, INITIAL ENCOUNTER: ICD-10-CM

## 2018-08-21 DIAGNOSIS — G20 PARKINSON'S DISEASE: ICD-10-CM

## 2018-08-21 LAB
ALBUMIN SERPL ELPH-MCNC: 4 G/DL — SIGNIFICANT CHANGE UP (ref 3.3–5)
ALP SERPL-CCNC: 82 U/L — SIGNIFICANT CHANGE UP (ref 40–120)
ALT FLD-CCNC: 10 U/L — LOW (ref 12–78)
ANION GAP SERPL CALC-SCNC: 10 MMOL/L — SIGNIFICANT CHANGE UP (ref 5–17)
ANION GAP SERPL CALC-SCNC: 8 MMOL/L — SIGNIFICANT CHANGE UP (ref 5–17)
APPEARANCE UR: CLEAR — SIGNIFICANT CHANGE UP
APTT BLD: 39.7 SEC — HIGH (ref 27.5–37.4)
AST SERPL-CCNC: 21 U/L — SIGNIFICANT CHANGE UP (ref 15–37)
BACTERIA # UR AUTO: ABNORMAL
BASOPHILS # BLD AUTO: 0.04 K/UL — SIGNIFICANT CHANGE UP (ref 0–0.2)
BASOPHILS # BLD AUTO: 0.06 K/UL — SIGNIFICANT CHANGE UP (ref 0–0.2)
BASOPHILS NFR BLD AUTO: 0.5 % — SIGNIFICANT CHANGE UP (ref 0–2)
BASOPHILS NFR BLD AUTO: 0.8 % — SIGNIFICANT CHANGE UP (ref 0–2)
BILIRUB SERPL-MCNC: 0.6 MG/DL — SIGNIFICANT CHANGE UP (ref 0.2–1.2)
BILIRUB UR-MCNC: NEGATIVE — SIGNIFICANT CHANGE UP
BUN SERPL-MCNC: 18 MG/DL — SIGNIFICANT CHANGE UP (ref 7–23)
BUN SERPL-MCNC: 19 MG/DL — SIGNIFICANT CHANGE UP (ref 7–23)
CALCIUM SERPL-MCNC: 8.5 MG/DL — SIGNIFICANT CHANGE UP (ref 8.5–10.1)
CALCIUM SERPL-MCNC: 8.6 MG/DL — SIGNIFICANT CHANGE UP (ref 8.5–10.1)
CHLORIDE SERPL-SCNC: 107 MMOL/L — SIGNIFICANT CHANGE UP (ref 96–108)
CHLORIDE SERPL-SCNC: 108 MMOL/L — SIGNIFICANT CHANGE UP (ref 96–108)
CO2 SERPL-SCNC: 25 MMOL/L — SIGNIFICANT CHANGE UP (ref 22–31)
CO2 SERPL-SCNC: 27 MMOL/L — SIGNIFICANT CHANGE UP (ref 22–31)
COLOR SPEC: YELLOW — SIGNIFICANT CHANGE UP
CREAT SERPL-MCNC: 0.84 MG/DL — SIGNIFICANT CHANGE UP (ref 0.5–1.3)
CREAT SERPL-MCNC: 1.02 MG/DL — SIGNIFICANT CHANGE UP (ref 0.5–1.3)
DIFF PNL FLD: NEGATIVE — SIGNIFICANT CHANGE UP
EOSINOPHIL # BLD AUTO: 0.36 K/UL — SIGNIFICANT CHANGE UP (ref 0–0.5)
EOSINOPHIL # BLD AUTO: 0.38 K/UL — SIGNIFICANT CHANGE UP (ref 0–0.5)
EOSINOPHIL NFR BLD AUTO: 4.6 % — SIGNIFICANT CHANGE UP (ref 0–6)
EOSINOPHIL NFR BLD AUTO: 4.9 % — SIGNIFICANT CHANGE UP (ref 0–6)
GLUCOSE SERPL-MCNC: 102 MG/DL — HIGH (ref 70–99)
GLUCOSE SERPL-MCNC: 125 MG/DL — HIGH (ref 70–99)
GLUCOSE UR QL: NEGATIVE MG/DL — SIGNIFICANT CHANGE UP
HCT VFR BLD CALC: 45.5 % — SIGNIFICANT CHANGE UP (ref 39–50)
HCT VFR BLD CALC: 46.3 % — SIGNIFICANT CHANGE UP (ref 39–50)
HGB BLD-MCNC: 14.2 G/DL — SIGNIFICANT CHANGE UP (ref 13–17)
HGB BLD-MCNC: 14.9 G/DL — SIGNIFICANT CHANGE UP (ref 13–17)
IMM GRANULOCYTES NFR BLD AUTO: 0.3 % — SIGNIFICANT CHANGE UP (ref 0–1.5)
IMM GRANULOCYTES NFR BLD AUTO: 0.3 % — SIGNIFICANT CHANGE UP (ref 0–1.5)
INR BLD: 1.09 RATIO — SIGNIFICANT CHANGE UP (ref 0.88–1.16)
KETONES UR-MCNC: NEGATIVE — SIGNIFICANT CHANGE UP
LEUKOCYTE ESTERASE UR-ACNC: ABNORMAL
LYMPHOCYTES # BLD AUTO: 2.86 K/UL — SIGNIFICANT CHANGE UP (ref 1–3.3)
LYMPHOCYTES # BLD AUTO: 2.87 K/UL — SIGNIFICANT CHANGE UP (ref 1–3.3)
LYMPHOCYTES # BLD AUTO: 36.6 % — SIGNIFICANT CHANGE UP (ref 13–44)
LYMPHOCYTES # BLD AUTO: 36.9 % — SIGNIFICANT CHANGE UP (ref 13–44)
MCHC RBC-ENTMCNC: 26.2 PG — LOW (ref 27–34)
MCHC RBC-ENTMCNC: 26.6 PG — LOW (ref 27–34)
MCHC RBC-ENTMCNC: 31.2 GM/DL — LOW (ref 32–36)
MCHC RBC-ENTMCNC: 32.2 GM/DL — SIGNIFICANT CHANGE UP (ref 32–36)
MCV RBC AUTO: 82.7 FL — SIGNIFICANT CHANGE UP (ref 80–100)
MCV RBC AUTO: 83.9 FL — SIGNIFICANT CHANGE UP (ref 80–100)
MONOCYTES # BLD AUTO: 0.45 K/UL — SIGNIFICANT CHANGE UP (ref 0–0.9)
MONOCYTES # BLD AUTO: 0.48 K/UL — SIGNIFICANT CHANGE UP (ref 0–0.9)
MONOCYTES NFR BLD AUTO: 5.8 % — SIGNIFICANT CHANGE UP (ref 2–14)
MONOCYTES NFR BLD AUTO: 6.2 % — SIGNIFICANT CHANGE UP (ref 2–14)
NEUTROPHILS # BLD AUTO: 3.96 K/UL — SIGNIFICANT CHANGE UP (ref 1.8–7.4)
NEUTROPHILS # BLD AUTO: 4.08 K/UL — SIGNIFICANT CHANGE UP (ref 1.8–7.4)
NEUTROPHILS NFR BLD AUTO: 50.9 % — SIGNIFICANT CHANGE UP (ref 43–77)
NEUTROPHILS NFR BLD AUTO: 52.2 % — SIGNIFICANT CHANGE UP (ref 43–77)
NITRITE UR-MCNC: NEGATIVE — SIGNIFICANT CHANGE UP
NRBC # BLD: 0 /100 WBCS — SIGNIFICANT CHANGE UP (ref 0–0)
NRBC # BLD: 0 /100 WBCS — SIGNIFICANT CHANGE UP (ref 0–0)
PH UR: 7 — SIGNIFICANT CHANGE UP (ref 5–8)
PLATELET # BLD AUTO: 305 K/UL — SIGNIFICANT CHANGE UP (ref 150–400)
PLATELET # BLD AUTO: 318 K/UL — SIGNIFICANT CHANGE UP (ref 150–400)
POTASSIUM SERPL-MCNC: 3.9 MMOL/L — SIGNIFICANT CHANGE UP (ref 3.5–5.3)
POTASSIUM SERPL-MCNC: 4 MMOL/L — SIGNIFICANT CHANGE UP (ref 3.5–5.3)
POTASSIUM SERPL-SCNC: 3.9 MMOL/L — SIGNIFICANT CHANGE UP (ref 3.5–5.3)
POTASSIUM SERPL-SCNC: 4 MMOL/L — SIGNIFICANT CHANGE UP (ref 3.5–5.3)
PROT SERPL-MCNC: 7.3 GM/DL — SIGNIFICANT CHANGE UP (ref 6–8.3)
PROT UR-MCNC: 15 MG/DL
PROTHROM AB SERPL-ACNC: 11.9 SEC — SIGNIFICANT CHANGE UP (ref 9.8–12.7)
RBC # BLD: 5.42 M/UL — SIGNIFICANT CHANGE UP (ref 4.2–5.8)
RBC # BLD: 5.6 M/UL — SIGNIFICANT CHANGE UP (ref 4.2–5.8)
RBC # FLD: 13.8 % — SIGNIFICANT CHANGE UP (ref 10.3–14.5)
RBC # FLD: 14.1 % — SIGNIFICANT CHANGE UP (ref 10.3–14.5)
RBC CASTS # UR COMP ASSIST: SIGNIFICANT CHANGE UP /HPF (ref 0–4)
SODIUM SERPL-SCNC: 142 MMOL/L — SIGNIFICANT CHANGE UP (ref 135–145)
SODIUM SERPL-SCNC: 143 MMOL/L — SIGNIFICANT CHANGE UP (ref 135–145)
SP GR SPEC: 1.01 — SIGNIFICANT CHANGE UP (ref 1.01–1.02)
UROBILINOGEN FLD QL: 1 MG/DL
WBC # BLD: 7.77 K/UL — SIGNIFICANT CHANGE UP (ref 3.8–10.5)
WBC # BLD: 7.81 K/UL — SIGNIFICANT CHANGE UP (ref 3.8–10.5)
WBC # FLD AUTO: 7.77 K/UL — SIGNIFICANT CHANGE UP (ref 3.8–10.5)
WBC # FLD AUTO: 7.81 K/UL — SIGNIFICANT CHANGE UP (ref 3.8–10.5)
WBC UR QL: SIGNIFICANT CHANGE UP

## 2018-08-21 PROCEDURE — 99223 1ST HOSP IP/OBS HIGH 75: CPT

## 2018-08-21 PROCEDURE — 73590 X-RAY EXAM OF LOWER LEG: CPT | Mod: 26,RT

## 2018-08-21 PROCEDURE — 93010 ELECTROCARDIOGRAM REPORT: CPT

## 2018-08-21 PROCEDURE — 12345: CPT | Mod: NC

## 2018-08-21 RX ORDER — CARBIDOPA AND LEVODOPA 25; 100 MG/1; MG/1
2 TABLET ORAL
Qty: 0 | Refills: 0 | Status: DISCONTINUED | OUTPATIENT
Start: 2018-08-21 | End: 2018-08-21

## 2018-08-21 RX ORDER — CARBIDOPA AND LEVODOPA 25; 100 MG/1; MG/1
1 TABLET ORAL
Qty: 0 | Refills: 0 | Status: DISCONTINUED | OUTPATIENT
Start: 2018-08-21 | End: 2018-08-23

## 2018-08-21 RX ORDER — SODIUM CHLORIDE 9 MG/ML
2400 INJECTION INTRAMUSCULAR; INTRAVENOUS; SUBCUTANEOUS ONCE
Qty: 0 | Refills: 0 | Status: COMPLETED | OUTPATIENT
Start: 2018-08-21 | End: 2018-08-21

## 2018-08-21 RX ORDER — POLYETHYLENE GLYCOL 3350 17 G/17G
17 POWDER, FOR SOLUTION ORAL DAILY
Qty: 0 | Refills: 0 | Status: DISCONTINUED | OUTPATIENT
Start: 2018-08-21 | End: 2018-08-23

## 2018-08-21 RX ORDER — VANCOMYCIN HCL 1 G
1000 VIAL (EA) INTRAVENOUS ONCE
Qty: 0 | Refills: 0 | Status: COMPLETED | OUTPATIENT
Start: 2018-08-21 | End: 2018-08-21

## 2018-08-21 RX ORDER — QUETIAPINE FUMARATE 200 MG/1
1 TABLET, FILM COATED ORAL
Qty: 0 | Refills: 0 | COMMUNITY

## 2018-08-21 RX ORDER — VANCOMYCIN HCL 1 G
1000 VIAL (EA) INTRAVENOUS EVERY 8 HOURS
Qty: 0 | Refills: 0 | Status: DISCONTINUED | OUTPATIENT
Start: 2018-08-21 | End: 2018-08-22

## 2018-08-21 RX ORDER — HEPARIN SODIUM 5000 [USP'U]/ML
5000 INJECTION INTRAVENOUS; SUBCUTANEOUS EVERY 12 HOURS
Qty: 0 | Refills: 0 | Status: DISCONTINUED | OUTPATIENT
Start: 2018-08-21 | End: 2018-08-23

## 2018-08-21 RX ADMIN — CARBIDOPA AND LEVODOPA 1 TABLET(S): 25; 100 TABLET ORAL at 20:24

## 2018-08-21 RX ADMIN — SODIUM CHLORIDE 2400 MILLILITER(S): 9 INJECTION INTRAMUSCULAR; INTRAVENOUS; SUBCUTANEOUS at 01:23

## 2018-08-21 RX ADMIN — Medication 250 MILLIGRAM(S): at 15:38

## 2018-08-21 RX ADMIN — CARBIDOPA AND LEVODOPA 2 TABLET(S): 25; 100 TABLET ORAL at 11:07

## 2018-08-21 RX ADMIN — Medication 250 MILLIGRAM(S): at 01:21

## 2018-08-21 RX ADMIN — CARBIDOPA AND LEVODOPA 2 TABLET(S): 25; 100 TABLET ORAL at 06:53

## 2018-08-21 NOTE — H&P ADULT - ASSESSMENT
37 y/o male with PMH of parkinson's s/p pacer insertion referred to ED by neuro-surgeon, Dr. Juares, as wound in right calf  grew MRSA; received PO doxycycline.  IMPROVE VTE Individual Risk Assessment          RISK                                                          Points    [  ] Previous VTE                                                3    [  ] Thrombophilia                                             2    [  ] Lower limb paralysis                                    2        (unable to hold up >15 seconds)      [  ] Current Cancer                                             2         (within 6 months)    [  ] Immobilization > 24 hrs                              1    [  ] ICU/CCU stay > 24 hours                            1    [  ] Age > 60                                                    1    IMPROVE VTE Score _____0____

## 2018-08-21 NOTE — ED PROVIDER NOTE - MEDICAL DECISION MAKING DETAILS
labs and imaging reviewed. patient received ivfs and abx. patient now admitted to medicine for further management.

## 2018-08-21 NOTE — ED ADULT NURSE NOTE - OBJECTIVE STATEMENT
PT a&O x3, pt sent  by neurosurgeon for evaluations. Pt c/o of wound on back of right calf x1 weeks. Pt was see at urgent care and was told wound was infected with MRSa and put on antibiotic doxycycline this morning. After taking 1 tab medication pt had diarrhea x1 day. Pt history of brain implant 3 months ago for parkinson's. Pt states last week he had burning itching sensation to right lower calf. Pt neurosurgeon Dr donnelly request pt to be seen in ER for IV antibiotic antibiotics. Pt took tylenols for leg pain 6/10  1830.

## 2018-08-21 NOTE — H&P ADULT - NSHPREVIEWOFSYSTEMS_GEN_ALL_CORE
No chills, No photophobia/eye pain/changes in vision, No ear pain/sore throat/dysphagia, No chest pain/palpitations, no SOB/cough/wheeze/stridor, No abdominal pain, No N/V/D, no dysuria/frequency/discharge, No neck/back pain, no rash, no changes in neurological status/function.

## 2018-08-21 NOTE — ED PROVIDER NOTE - OBJECTIVE STATEMENT
Pertinent PMH/PSH/FHx/SHx and Review of Systems contained within:  37 yo m with PMH of parkinson's s/p pacer insertion referred to ED by neuro-surgeon, Dr. Juares from [facility]  for iv abx. patient reports seen in urgent care for wound in right calf was given po abx and wound culture sent. He was then called saying wound culture grew MRSA and was told to continue doxy however, because of the pacer insertion patient adviced by Dr. Juares to get iv abx. No aggravating or relieving factors, No fever/chills, No photophobia/eye pain/changes in vision, No ear pain/sore throat/dysphagia, No chest pain/palpitations, no SOB/cough/wheeze/stridor, No abdominal pain, No N/V/D, no dysuria/frequency/discharge, No neck/back pain, no rash, no changes in neurological status/function.

## 2018-08-21 NOTE — ED ADULT NURSE NOTE - NSIMPLEMENTINTERV_GEN_ALL_ED
Implemented All Fall Risk Interventions:  [place] to call system. Call bell, personal items and telephone within reach. Instruct patient to call for assistance. Room bathroom lighting operational. Non-slip footwear when patient is off stretcher. Physically safe environment: no spills, clutter or unnecessary equipment. Stretcher in lowest position, wheels locked, appropriate side rails in place. Provide visual cue, wrist band, yellow gown, etc. Monitor gait and stability. Monitor for mental status changes and reorient to person, place, and time. Review medications for side effects contributing to fall risk. Reinforce activity limits and safety measures with patient and family.

## 2018-08-21 NOTE — H&P ADULT - HISTORY OF PRESENT ILLNESS
39 y/o male with PMH of parkinson's s/p pacer insertion referred to ED by neuro-surgeon, Dr. Juares, from [facility],  for IV abx. The patient reports was seen in urgent care for wound in right calf and was given po abx and wound culture sent. He was then called saying wound culture grew MRSA and was told to continue doxy; however, because of the pacer insertion patient advised by Dr. Juares to get iv abx. Pt had fever, he denies CP, SOB, cough, abd pain, N/V/diarrhea, headache, neck pain, dysuria.

## 2018-08-21 NOTE — H&P ADULT - NSHPLABSRESULTS_GEN_ALL_CORE
LABS:                        14.9   7.81  )-----------( 305      ( 21 Aug 2018 01:23 )             46.3     08-21    143  |  108  |  19  ----------------------------<  102<H>  4.0   |  27  |  1.02    Ca    8.6      21 Aug 2018 01:23    TPro  7.3  /  Alb  4.0  /  TBili  0.6  /  DBili  x   /  AST  21  /  ALT  10<L>  /  AlkPhos  82  08-21    PT/INR - ( 21 Aug 2018 01:23 )   PT: 11.9 sec;   INR: 1.09 ratio         PTT - ( 21 Aug 2018 01:23 )  PTT:39.7 sec    CAPILLARY BLOOD GLUCOSE              RADIOLOGY & ADDITIONAL TESTS:    Imaging Personally Reviewed:  [ ] YES  [ ] NO

## 2018-08-21 NOTE — H&P ADULT - NSHPPHYSICALEXAM_GEN_ALL_CORE
T(C): 36.6 (21 Aug 2018 03:05), Max: 36.6 (21 Aug 2018 03:05)  T(F): 97.8 (21 Aug 2018 03:05), Max: 97.8 (21 Aug 2018 03:05)  HR: 77 (21 Aug 2018 03:05) (77 - 81)  BP: 111/59 (21 Aug 2018 03:05) (111/59 - 138/78)  BP(mean): --  RR: 16 (21 Aug 2018 03:05) (16 - 20)  SpO2: 97% (21 Aug 2018 03:05) (97% - 97%)    PHYSICAL EXAM:  GENERAL: NAD, well-groomed, well-developed  HEAD:  Atraumatic, Normocephalic  EYES: EOMI, PERRLA, conjunctiva and sclera clear  ENMT: No tonsillar erythema, exudates, or enlargement; Moist mucous membranes, Good dentition, No lesions  NECK: Supple, No JVD, Normal thyroid  NERVOUS SYSTEM:  Alert & Oriented X3, Good concentration; Motor Strength 5/5 B/L upper and lower extremities; DTRs 2+ intact and symmetric, mildly increase tonus, no tremor  CHEST/LUNG: Clear to percussion bilaterally; No rales, rhonchi, wheezing, or rubs  HEART: Regular rate and rhythm; No murmurs, rubs, or gallops  ABDOMEN: Soft, Nontender, Nondistended; Bowel sounds present  EXTREMITIES:  2+ Peripheral Pulses, No clubbing, cyanosis, or edema, right posterior leg, [id number] scab with minimal tenderness, +/- erythema  LYMPH: No lymphadenopathy noted  SKIN: No rashes or lesions

## 2018-08-22 DIAGNOSIS — L03.116 CELLULITIS OF LEFT LOWER LIMB: ICD-10-CM

## 2018-08-22 DIAGNOSIS — Z29.9 ENCOUNTER FOR PROPHYLACTIC MEASURES, UNSPECIFIED: ICD-10-CM

## 2018-08-22 DIAGNOSIS — L03.115 CELLULITIS OF RIGHT LOWER LIMB: ICD-10-CM

## 2018-08-22 LAB
ANION GAP SERPL CALC-SCNC: 7 MMOL/L — SIGNIFICANT CHANGE UP (ref 5–17)
BUN SERPL-MCNC: 11 MG/DL — SIGNIFICANT CHANGE UP (ref 7–23)
CALCIUM SERPL-MCNC: 8.5 MG/DL — SIGNIFICANT CHANGE UP (ref 8.5–10.1)
CHLORIDE SERPL-SCNC: 106 MMOL/L — SIGNIFICANT CHANGE UP (ref 96–108)
CO2 SERPL-SCNC: 28 MMOL/L — SIGNIFICANT CHANGE UP (ref 22–31)
CREAT SERPL-MCNC: 0.83 MG/DL — SIGNIFICANT CHANGE UP (ref 0.5–1.3)
CULTURE RESULTS: NO GROWTH — SIGNIFICANT CHANGE UP
GLUCOSE SERPL-MCNC: 104 MG/DL — HIGH (ref 70–99)
HCT VFR BLD CALC: 46.5 % — SIGNIFICANT CHANGE UP (ref 39–50)
HGB BLD-MCNC: 14.7 G/DL — SIGNIFICANT CHANGE UP (ref 13–17)
MCHC RBC-ENTMCNC: 26.3 PG — LOW (ref 27–34)
MCHC RBC-ENTMCNC: 31.6 GM/DL — LOW (ref 32–36)
MCV RBC AUTO: 83.3 FL — SIGNIFICANT CHANGE UP (ref 80–100)
NRBC # BLD: 0 /100 WBCS — SIGNIFICANT CHANGE UP (ref 0–0)
PLATELET # BLD AUTO: 293 K/UL — SIGNIFICANT CHANGE UP (ref 150–400)
POTASSIUM SERPL-MCNC: 4 MMOL/L — SIGNIFICANT CHANGE UP (ref 3.5–5.3)
POTASSIUM SERPL-SCNC: 4 MMOL/L — SIGNIFICANT CHANGE UP (ref 3.5–5.3)
RBC # BLD: 5.58 M/UL — SIGNIFICANT CHANGE UP (ref 4.2–5.8)
RBC # FLD: 13.7 % — SIGNIFICANT CHANGE UP (ref 10.3–14.5)
SODIUM SERPL-SCNC: 141 MMOL/L — SIGNIFICANT CHANGE UP (ref 135–145)
SPECIMEN SOURCE: SIGNIFICANT CHANGE UP
VANCOMYCIN TROUGH SERPL-MCNC: 12.1 UG/ML — SIGNIFICANT CHANGE UP (ref 10–20)
WBC # BLD: 8.11 K/UL — SIGNIFICANT CHANGE UP (ref 3.8–10.5)
WBC # FLD AUTO: 8.11 K/UL — SIGNIFICANT CHANGE UP (ref 3.8–10.5)

## 2018-08-22 PROCEDURE — 99233 SBSQ HOSP IP/OBS HIGH 50: CPT

## 2018-08-22 RX ORDER — VANCOMYCIN HCL 1 G
1250 VIAL (EA) INTRAVENOUS EVERY 8 HOURS
Qty: 0 | Refills: 0 | Status: DISCONTINUED | OUTPATIENT
Start: 2018-08-22 | End: 2018-08-23

## 2018-08-22 RX ORDER — ACETAMINOPHEN 500 MG
650 TABLET ORAL EVERY 6 HOURS
Qty: 0 | Refills: 0 | Status: DISCONTINUED | OUTPATIENT
Start: 2018-08-22 | End: 2018-08-23

## 2018-08-22 RX ADMIN — Medication 250 MILLIGRAM(S): at 08:39

## 2018-08-22 RX ADMIN — CARBIDOPA AND LEVODOPA 1 TABLET(S): 25; 100 TABLET ORAL at 00:04

## 2018-08-22 RX ADMIN — CARBIDOPA AND LEVODOPA 1 TABLET(S): 25; 100 TABLET ORAL at 12:12

## 2018-08-22 RX ADMIN — CARBIDOPA AND LEVODOPA 1 TABLET(S): 25; 100 TABLET ORAL at 21:44

## 2018-08-22 RX ADMIN — Medication 250 MILLIGRAM(S): at 00:06

## 2018-08-22 RX ADMIN — CARBIDOPA AND LEVODOPA 1 TABLET(S): 25; 100 TABLET ORAL at 16:20

## 2018-08-22 RX ADMIN — CARBIDOPA AND LEVODOPA 1 TABLET(S): 25; 100 TABLET ORAL at 08:36

## 2018-08-22 RX ADMIN — Medication 166.67 MILLIGRAM(S): at 14:16

## 2018-08-22 RX ADMIN — Medication 166.67 MILLIGRAM(S): at 21:45

## 2018-08-22 NOTE — PROGRESS NOTE ADULT - PROBLEM SELECTOR PLAN 2
has MRSA. isolation precautions, IV vancomycin  ID consultappreciated continue to monitor renal function on vanco

## 2018-08-22 NOTE — PROGRESS NOTE ADULT - ASSESSMENT
37 y/o male with PMH of parkinson's s/p pacer insertion referred to ED by neuro-surgeon, Dr. Juares, as wound in right calf  grew MRSA; received PO doxycycline.      \stable doing well anticipate discharge tomorrow with po coverage for mrsa

## 2018-08-22 NOTE — PROGRESS NOTE ADULT - SUBJECTIVE AND OBJECTIVE BOX
HPI:  39 y/o male with PMH of parkinson's s/p pacer insertion referred to ED by neuro-surgeon, Dr. Juares, from [facility],  for IV abx. The patient reports was seen in urgent care for wound in right calf and was given po abx and wound culture sent. He was then called saying wound culture grew MRSA and was told to continue doxy; however, because of the pacer insertion patient advised by Dr. Juares to get iv abx. Pt had fever, he denies CP, SOB, cough, abd pain, N/V/diarrhea, headache, neck pain, dysuria. (21 Aug 2018 03:31)      Allergies    cefazolin (Flushing; Rash)    Intolerances        MEDICATIONS  (STANDING):  carbidopa/levodopa  25/100 1 Tablet(s) Oral <User Schedule>  heparin  Injectable 5000 Unit(s) SubCutaneous every 12 hours  vancomycin  IVPB 1250 milliGRAM(s) IV Intermittent every 8 hours    MEDICATIONS  (PRN):  polyethylene glycol 3350 17 Gram(s) Oral daily PRN Constipation      REVIEW OF SYSTEMS:    CONSTITUTIONAL: No fever, chills, weight loss, or fatigue  HEENT: No sore throat, runny nose, ear ache  RESPIRATORY: No cough, wheezing, No shortness of breath  CARDIOVASCULAR: No chest pain, palpitations, dizziness  GASTROINTESTINAL: No abdominal pain. No nausea, vomiting, diarrhea  GENITOURINARY: No dysuria, increase frequency, hematuria, or incontinence  NEUROLOGICAL: No headaches, memory loss, loss of strength, numbness, or tremors, no weakness  EXTREMITY: No pedal edema BLE  SKIN: No itching, burning, rashes, or lesions     VITAL SIGNS:  T(C): 36.9 (18 @ 11:11), Max: 36.9 (18 @ 23:32)  T(F): 98.4 (18 @ :11), Max: 98.5 (18 @ 23:32)  HR: 75 (18:11) (63 - 75)  BP: 124/72 (18 11:11) (119/64 - 128/75)  RR: 19 (18 11:11) (17 - 19)  SpO2: 98% (08-22-18 @ 11:11) (96% - 98%)  Wt(kg): --    PHYSICAL EXAM:    GENERAL: not in any distress  HEENT: Neck is supple, normocephalic, atraumatic   CHEST/LUNG: Clear to percussion bilaterally; No rales, rhonchi, wheezing  HEART: Regular rate and rhythm; No murmurs, rubs, or gallops  ABDOMEN: Soft, Nontender, Nondistended; Bowel sounds present, no rebound   EXTREMITIES:  2+ Peripheral Pulses, No clubbing, cyanosis, or edema  GENITOURINARY:   SKIN: leg healing wound with mod erythematous edematous and tender   BACK: no pressor sore   NERVOUS SYSTEM:  Alert & Oriented     LABS:                         14.7   8.11  )-----------( 293      ( 22 Aug 2018 07:07 )             46.5         141  |  106  |  11  ----------------------------<  104<H>  4.0   |  28  |  0.83    Ca    8.5      22 Aug 2018 07:07    TPro  7.3  /  Alb  4.0  /  TBili  0.6  /  DBili  x   /  AST  21  /  ALT  10<L>  /  AlkPhos  82  08    LIVER FUNCTIONS - ( 21 Aug 2018 01:23 )  Alb: 4.0 g/dL / Pro: 7.3 gm/dL / ALK PHOS: 82 U/L / ALT: 10 U/L / AST: 21 U/L / GGT: x           PT/INR - ( 21 Aug 2018 01:23 )   PT: 11.9 sec;   INR: 1.09 ratio         PTT - ( 21 Aug 2018 01:23 )  PTT:39.7 sec  Urinalysis Basic - ( 21 Aug 2018 05:12 )    Color: Yellow / Appearance: Clear / S.015 / pH: x  Gluc: x / Ketone: Negative  / Bili: Negative / Urobili: 1 mg/dL   Blood: x / Protein: 15 mg/dL / Nitrite: Negative   Leuk Esterase: Trace / RBC: 0-2 /HPF / WBC 0-2   Sq Epi: x / Non Sq Epi: x / Bacteria: Occasional                    Vancomycin Level, Trough: 12.1 ug/mL ( @ 08:17)        Culture Results:   No growth ( @ 08:35)  Culture Results:   No growth to date. ( @ 08:25)  Culture Results:   No growth to date. ( @ 08:25)                Radiology:

## 2018-08-22 NOTE — PROGRESS NOTE ADULT - ASSESSMENT
abscess leg   methicillin resitant staph aureus as outpatient wound culture   will continue vanco  local care   will follow with you thanks

## 2018-08-22 NOTE — PROGRESS NOTE ADULT - PROBLEM SELECTOR PLAN 1
appreciate id consult emperic coverage with vancomysin will dc home tomorrow with course of oral abx

## 2018-08-22 NOTE — PROGRESS NOTE ADULT - SUBJECTIVE AND OBJECTIVE BOX
Patient is a 38y old  Male who presents with a chief complaint of Sent by his physician. (21 Aug 2018 03:31)      INTERVAL HPI/OVERNIGHT EVENTS: no acute events overnight comfortable     MEDICATIONS  (STANDING):  carbidopa/levodopa  25/100 1 Tablet(s) Oral <User Schedule>  heparin  Injectable 5000 Unit(s) SubCutaneous every 12 hours  vancomycin  IVPB 1250 milliGRAM(s) IV Intermittent every 8 hours    MEDICATIONS  (PRN):  polyethylene glycol 3350 17 Gram(s) Oral daily PRN Constipation      Allergies    cefazolin (Flushing; Rash)    Intolerances        REVIEW OF SYSTEMS:  CONSTITUTIONAL: No fever, weight loss, or fatigue  EYES: No eye pain, visual disturbances, or discharge  ENMT:  No difficulty hearing, tinnitus, vertigo; No sinus or throat pain  NECK: No pain or stiffness  BREASTS: No pain, masses, or nipple discharge  RESPIRATORY: No cough, wheezing, chills or hemoptysis; No shortness of breath  CARDIOVASCULAR: No chest pain, palpitations, dizziness, or leg swelling  GASTROINTESTINAL: No abdominal or epigastric pain. No nausea, vomiting, or hematemesis; No diarrhea or constipation. No melena or hematochezia.  GENITOURINARY: No dysuria, frequency, hematuria, or incontinence  NEUROLOGICAL: parkinson's  tremor  SKIN:  right leg cellulitis LYMPH NODES: No enlarged glands  ENDOCRINE: No heat or cold intolerance; No hair loss  MUSCULOSKELETAL: No joint pain or swelling; No muscle, back, or extremity pain  PSYCHIATRIC: No depression, anxiety, mood swings, or difficulty sleeping  HEME/LYMPH: No easy bruising, or bleeding gums  ALLERGY AND IMMUNOLOGIC: No hives or eczema    Vital Signs Last 24 Hrs  T(C): 36.9 (22 Aug 2018 11:11), Max: 36.9 (21 Aug 2018 23:32)  T(F): 98.4 (22 Aug 2018 11:11), Max: 98.5 (21 Aug 2018 23:32)  HR: 75 (22 Aug 2018 11:11) (63 - 75)  BP: 124/72 (22 Aug 2018 11:11) (119/64 - 128/75)  BP(mean): --  RR: 19 (22 Aug 2018 11:11) (17 - 19)  SpO2: 98% (22 Aug 2018 11:11) (96% - 98%)    PHYSICAL EXAM:  GENERAL: NAD, well-groomed, well-developed  HEAD:  Atraumatic, Normocephalic  EYES: EOMI, PERRLA, conjunctiva and sclera clear  ENMT: No tonsillar erythema, exudates, or enlargement; Moist mucous membranes, Good dentition, No lesions  NECK: Supple, No JVD, Normal thyroid  NERVOUS SYSTEM:  Alert & Oriented X3, Good concentration; Motor Strength 5/5 B/L upper and lower extremities; DTRs 2+ intact and symmetric  CHEST/LUNG: Clear to percussion bilaterally; No rales, rhonchi, wheezing, or rubs  HEART: Regular rate and rhythm; No murmurs, rubs, or gallops  ABDOMEN: Soft, Nontender, Nondistended; Bowel sounds present  EXTREMITIES:  2+ Peripheral Pulses, No clubbing, cyanosis, or edema  LYMPH: No lymphadenopathy noted  SKIN: right  leg small lesion dry with slight erythema   LABS:                        14.7   8.11  )-----------( 293      ( 22 Aug 2018 07:07 )             46.5     08-    141  |  106  |  11  ----------------------------<  104<H>  4.0   |  28  |  0.83    Ca    8.5      22 Aug 2018 07:07    TPro  7.3  /  Alb  4.0  /  TBili  0.6  /  DBili  x   /  AST  21  /  ALT  10<L>  /  AlkPhos  82  08-21    PT/INR - ( 21 Aug 2018 01:23 )   PT: 11.9 sec;   INR: 1.09 ratio         PTT - ( 21 Aug 2018 01:23 )  PTT:39.7 sec  Urinalysis Basic - ( 21 Aug 2018 05:12 )    Color: Yellow / Appearance: Clear / S.015 / pH: x  Gluc: x / Ketone: Negative  / Bili: Negative / Urobili: 1 mg/dL   Blood: x / Protein: 15 mg/dL / Nitrite: Negative   Leuk Esterase: Trace / RBC: 0-2 /HPF / WBC 0-2   Sq Epi: x / Non Sq Epi: x / Bacteria: Occasional      CAPILLARY BLOOD GLUCOSE          RADIOLOGY & ADDITIONAL TESTS:    Imaging Personally Reviewed:  [X ] YES  [ ] NO    Consultant(s) Notes Reviewed:  [ X] YES  [ ] NO    Care Discussed with Consultants/Other Providers [X ] YES  [ ] NO

## 2018-08-23 ENCOUNTER — TRANSCRIPTION ENCOUNTER (OUTPATIENT)
Age: 38
End: 2018-08-23

## 2018-08-23 VITALS
HEART RATE: 92 BPM | TEMPERATURE: 99 F | DIASTOLIC BLOOD PRESSURE: 76 MMHG | SYSTOLIC BLOOD PRESSURE: 125 MMHG | RESPIRATION RATE: 18 BRPM | OXYGEN SATURATION: 97 %

## 2018-08-23 LAB
ANION GAP SERPL CALC-SCNC: 9 MMOL/L — SIGNIFICANT CHANGE UP (ref 5–17)
BUN SERPL-MCNC: 15 MG/DL — SIGNIFICANT CHANGE UP (ref 7–23)
CALCIUM SERPL-MCNC: 8.7 MG/DL — SIGNIFICANT CHANGE UP (ref 8.5–10.1)
CHLORIDE SERPL-SCNC: 105 MMOL/L — SIGNIFICANT CHANGE UP (ref 96–108)
CO2 SERPL-SCNC: 27 MMOL/L — SIGNIFICANT CHANGE UP (ref 22–31)
CREAT SERPL-MCNC: 0.94 MG/DL — SIGNIFICANT CHANGE UP (ref 0.5–1.3)
GLUCOSE SERPL-MCNC: 97 MG/DL — SIGNIFICANT CHANGE UP (ref 70–99)
HBA1C BLD-MCNC: 5.5 % — SIGNIFICANT CHANGE UP (ref 4–5.6)
HCT VFR BLD CALC: 46.6 % — SIGNIFICANT CHANGE UP (ref 39–50)
HGB BLD-MCNC: 14.7 G/DL — SIGNIFICANT CHANGE UP (ref 13–17)
MCHC RBC-ENTMCNC: 26.1 PG — LOW (ref 27–34)
MCHC RBC-ENTMCNC: 31.5 GM/DL — LOW (ref 32–36)
MCV RBC AUTO: 82.8 FL — SIGNIFICANT CHANGE UP (ref 80–100)
NRBC # BLD: 0 /100 WBCS — SIGNIFICANT CHANGE UP (ref 0–0)
PLATELET # BLD AUTO: 309 K/UL — SIGNIFICANT CHANGE UP (ref 150–400)
POTASSIUM SERPL-MCNC: 3.9 MMOL/L — SIGNIFICANT CHANGE UP (ref 3.5–5.3)
POTASSIUM SERPL-SCNC: 3.9 MMOL/L — SIGNIFICANT CHANGE UP (ref 3.5–5.3)
RBC # BLD: 5.63 M/UL — SIGNIFICANT CHANGE UP (ref 4.2–5.8)
RBC # FLD: 13.6 % — SIGNIFICANT CHANGE UP (ref 10.3–14.5)
SODIUM SERPL-SCNC: 141 MMOL/L — SIGNIFICANT CHANGE UP (ref 135–145)
VANCOMYCIN TROUGH SERPL-MCNC: 13.3 UG/ML — SIGNIFICANT CHANGE UP (ref 10–20)
WBC # BLD: 7.85 K/UL — SIGNIFICANT CHANGE UP (ref 3.8–10.5)
WBC # FLD AUTO: 7.85 K/UL — SIGNIFICANT CHANGE UP (ref 3.8–10.5)

## 2018-08-23 PROCEDURE — 99239 HOSP IP/OBS DSCHRG MGMT >30: CPT

## 2018-08-23 RX ORDER — VANCOMYCIN HCL 1 G
1250 VIAL (EA) INTRAVENOUS EVERY 8 HOURS
Qty: 0 | Refills: 0 | Status: DISCONTINUED | OUTPATIENT
Start: 2018-08-23 | End: 2018-08-23

## 2018-08-23 RX ADMIN — Medication 166.67 MILLIGRAM(S): at 08:29

## 2018-08-23 RX ADMIN — CARBIDOPA AND LEVODOPA 1 TABLET(S): 25; 100 TABLET ORAL at 05:33

## 2018-08-23 RX ADMIN — HEPARIN SODIUM 5000 UNIT(S): 5000 INJECTION INTRAVENOUS; SUBCUTANEOUS at 05:34

## 2018-08-23 NOTE — DISCHARGE NOTE ADULT - CARE PLAN
Principal Discharge DX:	Cellulitis of left lower extremity  Goal:	please continue abx and follow up with Dr Marina  Assessment and plan of treatment:	doxycycline for 5 days and please follow up with your neurologist  Secondary Diagnosis:	Infected wound  Goal:	please follow up with ID  Secondary Diagnosis:	Parkinson disease  Goal:	please follow up with your neurologist

## 2018-08-23 NOTE — DISCHARGE NOTE ADULT - MEDICATION SUMMARY - MEDICATIONS TO TAKE
I will START or STAY ON the medications listed below when I get home from the hospital:    doxycycline hyclate 100 mg oral capsule  -- 1 cap(s) by mouth once a day   -- Avoid prolonged or excessive exposure to direct and/or artificial sunlight while taking this medication.  Do not take this drug if you are pregnant.  Finish all this medication unless otherwise directed by prescriber.  Medication should be taken with plenty of water.    -- Indication: For Cellulitis of left lower extremity    carbidopa-levodopa 25 mg-100 mg oral tablet  -- 1 tab(s) by mouth 5 times a day  -- Indication: For Parkinson disease

## 2018-08-23 NOTE — DISCHARGE NOTE ADULT - PLAN OF CARE
please continue abx and follow up with Dr Marina doxycycline for 5 days and please follow up with your neurologist please follow up with ID please follow up with your neurologist

## 2018-08-23 NOTE — PROGRESS NOTE ADULT - ASSESSMENT
Rt leg abscess   methicillin resitant staph aureus as outpatient wound culture   will continue vanco  vanco trough   local care   will follow up  thanks

## 2018-08-23 NOTE — DISCHARGE NOTE ADULT - HOSPITAL COURSE
Source of Information	Chart(s), Patient	  Outpatient Providers	Dr. Juares	     Language:  · Patient/Family of Limited English Proficiency	No	       History of Present Illness:  Reason for Admission: Sent by his physician.	  History of Present Illness: 	  37 y/o male with PMH of parkinson's s/p pacer insertion referred to ED by neuro-surgeon, Dr. Juares, from [facility],  for IV abx. The patient reports was seen in urgent care for wound in right calf and was given po abx and wound culture sent. He was then called saying wound culture grew MRSA and was told to continue doxy; however, because of the pacer insertion patient advised by Dr. Juares to get iv abx. Pt had fever, he denies CP, SOB, cough, abd pain, N/V/diarrhea, headache, neck pain, dysuria.    PHYSICAL EXAM:  GENERAL: NAD, well-groomed, well-developed  HEAD:  Atraumatic, Normocephalic  EYES: EOMI, PERRLA, conjunctiva and sclera clear  ENMT: No tonsillar erythema, exudates, or enlargement; Moist mucous membranes, Good dentition, No lesions  NECK: Supple, No JVD, Normal thyroid  NERVOUS SYSTEM:  Alert & Oriented X3, Good concentration; Motor Strength 5/5 B/L upper and lower extremities; DTRs 2+ intact and symmetric  CHEST/LUNG: Clear to percussion bilaterally; No rales, rhonchi, wheezing, or rubs  HEART: Regular rate and rhythm; No murmurs, rubs, or gallops  ABDOMEN: Soft, Nontender, Nondistended; Bowel sounds present  EXTREMITIES:  2+ Peripheral Pulses, No clubbing, cyanosis, or edema  LYMPH: No lymphadenopathy noted  SKIN: right  leg small lesion dry with slight erythema   Assessment and Plan:   · Assessment		     Problem/Plan - 3:  ·  Problem: Parkinson disease.  Plan: continue Sinemet at home dose.      Problem/Plan - 4:  ·  Problem: Preventive measure.  Plan: heparin sub Q.     Attending Attestation:   40 minutes spent on total discharge     Patient observed in house and placed on IV vancomycin for MRSA. Patient seen by ID dr wilkins    decision made to send home on five day course of doxycyclin with follow up with Neurology and ID

## 2018-08-23 NOTE — PROGRESS NOTE ADULT - SUBJECTIVE AND OBJECTIVE BOX
37 y/o male with PMH of parkinson's s/p pacer insertion referred to ED by neuro-surgeon, Dr. Juares, from [facility],  for IV abx. The patient reports was seen in urgent care for wound in right calf and was given po abx and wound culture sent. He was then called saying wound culture grew MRSA and was told to continue doxy; however, because of the pacer insertion patient advised by Dr. Juares to get iv abx. Pt had fever, he denies CP, SOB, cough, abd pain, N/V/diarrhea, headache, neck pain, dysuria. (21 Aug 2018 03:31)      Allergies    cefazolin (Flushing; Rash)    Intolerances        MEDICATIONS  (STANDING):  carbidopa/levodopa  25/100 1 Tablet(s) Oral <User Schedule>  heparin  Injectable 5000 Unit(s) SubCutaneous every 12 hours  vancomycin  IVPB 1250 milliGRAM(s) IV Intermittent every 8 hours    MEDICATIONS  (PRN):  acetaminophen   Tablet. 650 milliGRAM(s) Oral every 6 hours PRN Moderate Pain (4 - 6)  polyethylene glycol 3350 17 Gram(s) Oral daily PRN Constipation      REVIEW OF SYSTEMS:    CONSTITUTIONAL: No fever, chills, weight loss, or fatigue  HEENT: No sore throat, runny nose, ear ache  RESPIRATORY: No cough, wheezing, No shortness of breath  CARDIOVASCULAR: No chest pain, palpitations, dizziness  GASTROINTESTINAL: No abdominal pain. No nausea, vomiting, diarrhea  GENITOURINARY: No dysuria, increase frequency, hematuria, or incontinence  NEUROLOGICAL: No headaches, memory loss, loss of strength, numbness, or tremors, no weakness  EXTREMITY: No pedal edema BLE  SKIN: No itching, burning, rashes, or lesions     VITAL SIGNS:  T(C): 37 (08-23-18 @ 10:52), Max: 37 (08-23-18 @ 10:52)  T(F): 98.6 (08-23-18 @ 10:52), Max: 98.6 (08-23-18 @ 10:52)  HR: 92 (08-23-18 @ 10:52) (78 - 92)  BP: 125/76 (08-23-18 @ 10:52) (125/76 - 138/78)  RR: 18 (08-23-18 @ 10:52) (16 - 18)  SpO2: 97% (08-23-18 @ 10:52) (97% - 100%)  Wt(kg): --    PHYSICAL EXAM:    GENERAL: not in any distress  HEENT: Neck is supple, normocephalic, atraumatic   CHEST/LUNG: Clear to percussion bilaterally; No rales, rhonchi, wheezing  HEART: Regular rate and rhythm; No murmurs, rubs, or gallops  ABDOMEN: Soft, Nontender, Nondistended; Bowel sounds present, no rebound   EXTREMITIES:  2+ Peripheral Pulses, No clubbing, cyanosis, or edema  GENITOURINARY:   SKIN: leg healing wound with mild surrounding cellulitis   BACK: no pressor sore   NERVOUS SYSTEM:  Alert & Oriented X3    LABS:                         14.7   7.85  )-----------( 309      ( 23 Aug 2018 07:55 )             46.6     08-23    141  |  105  |  15  ----------------------------<  97  3.9   |  27  |  0.94    Ca    8.7      23 Aug 2018 07:55                          Vancomycin Level, Trough: 13.3 ug/mL (08-23 @ 07:55)        Culture Results:   No growth (08-21 @ 08:35)  Culture Results:   No growth to date. (08-21 @ 08:25)  Culture Results:   No growth to date. (08-21 @ 08:25)                Radiology:

## 2018-08-23 NOTE — DISCHARGE NOTE ADULT - CARE PROVIDER_API CALL
Maria T Marina), Infectious Disease; Internal Medicine  [address] [place] [address]  Phone: [phone]  Fax: [phone]

## 2018-08-23 NOTE — DISCHARGE NOTE ADULT - PATIENT PORTAL LINK FT
You can access the [facility] Patient Portal, offered by [facility], by registering with the following website: [email or web address]

## 2018-08-28 DIAGNOSIS — G20 PARKINSON'S DISEASE: ICD-10-CM

## 2018-08-28 DIAGNOSIS — Z96.89 PRESENCE OF OTHER SPECIFIED FUNCTIONAL IMPLANTS: ICD-10-CM

## 2018-08-28 DIAGNOSIS — M19.90 UNSPECIFIED OSTEOARTHRITIS, UNSPECIFIED SITE: ICD-10-CM

## 2018-08-28 DIAGNOSIS — B95.62 METHICILLIN RESISTANT STAPHYLOCOCCUS AUREUS INFECTION AS THE CAUSE OF DISEASES CLASSIFIED ELSEWHERE: ICD-10-CM

## 2018-08-28 DIAGNOSIS — Z88.1 ALLERGY STATUS TO OTHER ANTIBIOTIC AGENTS STATUS: ICD-10-CM

## 2018-08-28 DIAGNOSIS — L03.115 CELLULITIS OF RIGHT LOWER LIMB: ICD-10-CM

## 2018-08-28 DIAGNOSIS — L02.415 CUTANEOUS ABSCESS OF RIGHT LOWER LIMB: ICD-10-CM

## 2018-09-13 ENCOUNTER — EMERGENCY (EMERGENCY)
Facility: HOSPITAL | Age: 38
LOS: 0 days | Discharge: HOME | End: 2018-09-14
Attending: EMERGENCY MEDICINE
Payer: MEDICARE

## 2018-09-13 VITALS
HEART RATE: 110 BPM | OXYGEN SATURATION: 97 % | TEMPERATURE: 98 F | DIASTOLIC BLOOD PRESSURE: 89 MMHG | SYSTOLIC BLOOD PRESSURE: 125 MMHG | WEIGHT: 175.05 LBS | HEIGHT: 67 IN | RESPIRATION RATE: 17 BRPM

## 2018-09-13 DIAGNOSIS — Z98.890 OTHER SPECIFIED POSTPROCEDURAL STATES: Chronic | ICD-10-CM

## 2018-09-13 LAB
ALBUMIN SERPL ELPH-MCNC: 3.8 G/DL — SIGNIFICANT CHANGE UP (ref 3.3–5)
ALP SERPL-CCNC: 88 U/L — SIGNIFICANT CHANGE UP (ref 40–120)
ALT FLD-CCNC: 27 U/L — SIGNIFICANT CHANGE UP (ref 12–78)
ANION GAP SERPL CALC-SCNC: 7 MMOL/L — SIGNIFICANT CHANGE UP (ref 5–17)
APTT BLD: 40.4 SEC — HIGH (ref 27.5–37.4)
AST SERPL-CCNC: 24 U/L — SIGNIFICANT CHANGE UP (ref 15–37)
BASOPHILS # BLD AUTO: 0.07 K/UL — SIGNIFICANT CHANGE UP (ref 0–0.2)
BASOPHILS NFR BLD AUTO: 0.5 % — SIGNIFICANT CHANGE UP (ref 0–2)
BILIRUB SERPL-MCNC: 0.5 MG/DL — SIGNIFICANT CHANGE UP (ref 0.2–1.2)
BUN SERPL-MCNC: 22 MG/DL — SIGNIFICANT CHANGE UP (ref 7–23)
CALCIUM SERPL-MCNC: 8.5 MG/DL — SIGNIFICANT CHANGE UP (ref 8.5–10.1)
CHLORIDE SERPL-SCNC: 105 MMOL/L — SIGNIFICANT CHANGE UP (ref 96–108)
CO2 SERPL-SCNC: 31 MMOL/L — SIGNIFICANT CHANGE UP (ref 22–31)
CREAT SERPL-MCNC: 1.05 MG/DL — SIGNIFICANT CHANGE UP (ref 0.5–1.3)
EOSINOPHIL # BLD AUTO: 0.2 K/UL — SIGNIFICANT CHANGE UP (ref 0–0.5)
EOSINOPHIL NFR BLD AUTO: 1.5 % — SIGNIFICANT CHANGE UP (ref 0–6)
GLUCOSE SERPL-MCNC: 96 MG/DL — SIGNIFICANT CHANGE UP (ref 70–99)
HCT VFR BLD CALC: 47.3 % — SIGNIFICANT CHANGE UP (ref 39–50)
HGB BLD-MCNC: 15.2 G/DL — SIGNIFICANT CHANGE UP (ref 13–17)
IMM GRANULOCYTES NFR BLD AUTO: 0.3 % — SIGNIFICANT CHANGE UP (ref 0–1.5)
INR BLD: 1.09 RATIO — SIGNIFICANT CHANGE UP (ref 0.88–1.16)
LACTATE SERPL-SCNC: 1.8 MMOL/L — SIGNIFICANT CHANGE UP (ref 0.7–2)
LYMPHOCYTES # BLD AUTO: 16.1 % — SIGNIFICANT CHANGE UP (ref 13–44)
LYMPHOCYTES # BLD AUTO: 2.18 K/UL — SIGNIFICANT CHANGE UP (ref 1–3.3)
MCHC RBC-ENTMCNC: 27.2 PG — SIGNIFICANT CHANGE UP (ref 27–34)
MCHC RBC-ENTMCNC: 32.1 GM/DL — SIGNIFICANT CHANGE UP (ref 32–36)
MCV RBC AUTO: 84.8 FL — SIGNIFICANT CHANGE UP (ref 80–100)
MONOCYTES # BLD AUTO: 0.67 K/UL — SIGNIFICANT CHANGE UP (ref 0–0.9)
MONOCYTES NFR BLD AUTO: 4.9 % — SIGNIFICANT CHANGE UP (ref 2–14)
NEUTROPHILS # BLD AUTO: 10.39 K/UL — HIGH (ref 1.8–7.4)
NEUTROPHILS NFR BLD AUTO: 76.7 % — SIGNIFICANT CHANGE UP (ref 43–77)
NRBC # BLD: 0 /100 WBCS — SIGNIFICANT CHANGE UP (ref 0–0)
PLATELET # BLD AUTO: 335 K/UL — SIGNIFICANT CHANGE UP (ref 150–400)
POTASSIUM SERPL-MCNC: 4.1 MMOL/L — SIGNIFICANT CHANGE UP (ref 3.5–5.3)
POTASSIUM SERPL-SCNC: 4.1 MMOL/L — SIGNIFICANT CHANGE UP (ref 3.5–5.3)
PROT SERPL-MCNC: 7.2 GM/DL — SIGNIFICANT CHANGE UP (ref 6–8.3)
PROTHROM AB SERPL-ACNC: 11.9 SEC — SIGNIFICANT CHANGE UP (ref 9.8–12.7)
RBC # BLD: 5.58 M/UL — SIGNIFICANT CHANGE UP (ref 4.2–5.8)
RBC # FLD: 13.2 % — SIGNIFICANT CHANGE UP (ref 10.3–14.5)
SODIUM SERPL-SCNC: 143 MMOL/L — SIGNIFICANT CHANGE UP (ref 135–145)
WBC # BLD: 13.55 K/UL — HIGH (ref 3.8–10.5)
WBC # FLD AUTO: 13.55 K/UL — HIGH (ref 3.8–10.5)

## 2018-09-13 PROCEDURE — 71045 X-RAY EXAM CHEST 1 VIEW: CPT | Mod: 26

## 2018-09-13 PROCEDURE — 99284 EMERGENCY DEPT VISIT MOD MDM: CPT

## 2018-09-13 RX ORDER — ACETAMINOPHEN 500 MG
650 TABLET ORAL ONCE
Qty: 0 | Refills: 0 | Status: COMPLETED | OUTPATIENT
Start: 2018-09-13 | End: 2018-09-13

## 2018-09-13 RX ORDER — IPRATROPIUM/ALBUTEROL SULFATE 18-103MCG
3 AEROSOL WITH ADAPTER (GRAM) INHALATION ONCE
Qty: 0 | Refills: 0 | Status: COMPLETED | OUTPATIENT
Start: 2018-09-13 | End: 2018-09-13

## 2018-09-13 RX ADMIN — Medication 3 MILLILITER(S): at 22:54

## 2018-09-13 RX ADMIN — Medication 650 MILLIGRAM(S): at 22:56

## 2018-09-13 RX ADMIN — Medication 650 MILLIGRAM(S): at 23:00

## 2018-09-13 NOTE — ED ADULT TRIAGE NOTE - CHIEF COMPLAINT QUOTE
"I have difficulty breathing I feel like something is stuck in my throat, I used my albuterol pumps but I did not get better"

## 2018-09-13 NOTE — ED ADULT NURSE NOTE - OBJECTIVE STATEMENT
Reports difficulty breathing for 2 days. burning sensation when breathing in . Reports difficulty breathing for 2 days. burning sensation when breathing in . Wheezing noted bilaterally. pt has hx of Parkinson's.

## 2018-09-14 VITALS
OXYGEN SATURATION: 95 % | RESPIRATION RATE: 19 BRPM | SYSTOLIC BLOOD PRESSURE: 117 MMHG | DIASTOLIC BLOOD PRESSURE: 61 MMHG | HEART RATE: 112 BPM | TEMPERATURE: 99 F

## 2018-09-14 PROCEDURE — 71275 CT ANGIOGRAPHY CHEST: CPT | Mod: 26

## 2018-09-14 RX ORDER — AZITHROMYCIN 500 MG/1
500 TABLET, FILM COATED ORAL ONCE
Qty: 0 | Refills: 0 | Status: COMPLETED | OUTPATIENT
Start: 2018-09-14 | End: 2018-09-14

## 2018-09-14 RX ORDER — AZITHROMYCIN 500 MG/1
1 TABLET, FILM COATED ORAL
Qty: 4 | Refills: 0
Start: 2018-09-14 | End: 2018-09-17

## 2018-09-14 RX ORDER — SODIUM CHLORIDE 9 MG/ML
1000 INJECTION INTRAMUSCULAR; INTRAVENOUS; SUBCUTANEOUS ONCE
Qty: 0 | Refills: 0 | Status: COMPLETED | OUTPATIENT
Start: 2018-09-14 | End: 2018-09-14

## 2018-09-14 RX ORDER — ALBUTEROL 90 UG/1
2 AEROSOL, METERED ORAL
Qty: 1 | Refills: 0
Start: 2018-09-14 | End: 2018-10-13

## 2018-09-14 RX ORDER — IPRATROPIUM/ALBUTEROL SULFATE 18-103MCG
3 AEROSOL WITH ADAPTER (GRAM) INHALATION ONCE
Qty: 0 | Refills: 0 | Status: COMPLETED | OUTPATIENT
Start: 2018-09-14 | End: 2018-09-14

## 2018-09-14 RX ADMIN — SODIUM CHLORIDE 2000 MILLILITER(S): 9 INJECTION INTRAMUSCULAR; INTRAVENOUS; SUBCUTANEOUS at 01:35

## 2018-09-14 RX ADMIN — AZITHROMYCIN 255 MILLIGRAM(S): 500 TABLET, FILM COATED ORAL at 01:09

## 2018-09-14 RX ADMIN — Medication 3 MILLILITER(S): at 03:57

## 2018-09-14 NOTE — ED PROVIDER NOTE - NS ED ROS FT
Constitutional: (-) fever  (-)chills  (-)sweats  Eyes/ENT: (-) blurry vision, (-) epistaxis  (-)rhinorrhea   (-) sore throat    Cardiovascular: (-) chest pain, (-) palpitations (-) edema   Respiratory: (+) cough, (+) shortness of breath   Gastrointestinal: (-)nausea  (-)vomiting, (-) diarrhea  (-) abdominal pain   :  (-)dysuria, (-)frequency, (-)urgency, (-)hematuria  Musculoskeletal: (-) neck pain, (+) back pain, (-) joint pain  Integumentary: (-) rash, (-) edema  Neurological: (-) headache, (-) altered mental status  (-)LOC

## 2018-09-14 NOTE — ED PROVIDER NOTE - OBJECTIVE STATEMENT
38yoM; with pmh signif for Parkinsons (s/p DBS implant), asthma; now p/w cough and wheezing x2-3 days. cough--nonproductive, associated with sob. denies f/c/s. denies n/v. c/o back pain with breathing. denies travel. denies trauma. denies smoking.  PMH: Parkinsons, Asthma  SOCIAL: No tobacco/illicit substance use/EtOH

## 2018-09-15 DIAGNOSIS — R05 COUGH: ICD-10-CM

## 2018-09-15 DIAGNOSIS — J18.9 PNEUMONIA, UNSPECIFIED ORGANISM: ICD-10-CM

## 2018-09-15 DIAGNOSIS — R06.2 WHEEZING: ICD-10-CM

## 2018-09-15 DIAGNOSIS — Z88.9 ALLERGY STATUS TO UNSPECIFIED DRUGS, MEDICAMENTS AND BIOLOGICAL SUBSTANCES: ICD-10-CM

## 2018-09-15 DIAGNOSIS — G20 PARKINSON'S DISEASE: ICD-10-CM

## 2018-09-15 DIAGNOSIS — J45.901 UNSPECIFIED ASTHMA WITH (ACUTE) EXACERBATION: ICD-10-CM

## 2018-09-19 LAB
CULTURE RESULTS: SIGNIFICANT CHANGE UP
SPECIMEN SOURCE: SIGNIFICANT CHANGE UP

## 2018-11-24 NOTE — PROGRESS NOTE ADULT - ASSESSMENT
Discussion/Summary   Mr Burks- the liver enzymes remain modestly elevated and the hep C test is negative.    - proceed with smaller portions for sustained weight loss which will likely improve the liver enzymes   - call [phone] now to schedule an ultrasound of the liver      Dr Greenfield        Verified Results  HEPATIC FUNCTION PANEL 26Apr2017 10:01AM TATIANA GREENFIELD     Test Name Result Flag Reference   PROTEIN, TOTAL 6.9 g/dl  6.1-8.1   ALBUMIN 4.0 g/dl  3.6-5.1   GLOBULIN 2.9  1.9-3.7   UNITS: g/dL (calc)   ALBUMIN/GLOBULIN RATIO 1.4 (calc)  1.0-2.5   BILIRUBIN, TOTAL 0.5 mg/dl  0.2-1.2   BILIRUBIN, DIRECT 0.1 mg/dl  < OR = 0.2   BILIRUBIN, INDIRECT 0.4  0.2-1.2   UNITS: mg/dL (calc)   ALKALINE PHOSPHATASE 73 u/l     AST 41 u/l H 10-35   ALT 48 u/l H 9-46   Test Performed at:  [facility] [place] [place]  [address]     ROBERTO CARLOS DODGE MD     HEPATITIS C ANTIBODY 26Apr2017 10:01AM [facility]ATIANA GREENFIELD     Test Name Result Flag Reference   HEPATITIS C ANTIBODY NON-REACTIVE  NON-REACTIVE   SIGNAL TO CUT-OFF 0.03  <1.00   REPORT COMMENT:  PT FASTING 12HRS    Test Performed at:  [facility] [address] [place]  [address]     ROBERTO CARLOS DODGE MD        37 year old man with PMH of Parkinson's s/p theta burst stimulation last month and arthritis presents in ED for 2 day history of fever associated with cough with yellowish sputum.  Signs, symptoms, and work up consistent with HCAP ?? xray NEGATIVE ct pos   procalcitonin is low   ct chest with bilateral nodular opacities   health care associated [place]   continue current treatment   discharge plan likely within 24-48 hours ?? all viral

## 2019-02-06 NOTE — PHYSICAL THERAPY INITIAL EVALUATION ADULT - LEVEL OF INDEPENDENCE: GAIT, REHAB EVAL
MORAIMA KARIMI presented to unit via w/c, accompanied by , with c/o contractions. 
Pt. weighed, gowned, voided, and to bed.  EFHM and TOCO applied, VS taken.  Pt. oriented to 
bed controls, call light, TV, heat, and A/C controls. supervision

## 2019-02-19 ENCOUNTER — EMERGENCY (EMERGENCY)
Facility: HOSPITAL | Age: 39
LOS: 0 days | Discharge: HOME | End: 2019-02-19
Attending: EMERGENCY MEDICINE
Payer: MEDICARE

## 2019-02-19 VITALS
TEMPERATURE: 99 F | HEIGHT: 66 IN | SYSTOLIC BLOOD PRESSURE: 122 MMHG | WEIGHT: 167.99 LBS | HEART RATE: 88 BPM | RESPIRATION RATE: 18 BRPM | OXYGEN SATURATION: 99 % | DIASTOLIC BLOOD PRESSURE: 79 MMHG

## 2019-02-19 VITALS
RESPIRATION RATE: 17 BRPM | OXYGEN SATURATION: 98 % | TEMPERATURE: 98 F | SYSTOLIC BLOOD PRESSURE: 123 MMHG | DIASTOLIC BLOOD PRESSURE: 74 MMHG | HEART RATE: 87 BPM

## 2019-02-19 DIAGNOSIS — Z98.890 OTHER SPECIFIED POSTPROCEDURAL STATES: Chronic | ICD-10-CM

## 2019-02-19 DIAGNOSIS — J45.909 UNSPECIFIED ASTHMA, UNCOMPLICATED: ICD-10-CM

## 2019-02-19 DIAGNOSIS — J11.1 INFLUENZA DUE TO UNIDENTIFIED INFLUENZA VIRUS WITH OTHER RESPIRATORY MANIFESTATIONS: ICD-10-CM

## 2019-02-19 DIAGNOSIS — G20 PARKINSON'S DISEASE: ICD-10-CM

## 2019-02-19 DIAGNOSIS — J06.9 ACUTE UPPER RESPIRATORY INFECTION, UNSPECIFIED: ICD-10-CM

## 2019-02-19 LAB
ALBUMIN SERPL ELPH-MCNC: 3.8 G/DL — SIGNIFICANT CHANGE UP (ref 3.3–5)
ALP SERPL-CCNC: 82 U/L — SIGNIFICANT CHANGE UP (ref 40–120)
ALT FLD-CCNC: 13 U/L — SIGNIFICANT CHANGE UP (ref 12–78)
ANION GAP SERPL CALC-SCNC: 7 MMOL/L — SIGNIFICANT CHANGE UP (ref 5–17)
APTT BLD: 39.7 SEC — HIGH (ref 28.5–37)
AST SERPL-CCNC: 21 U/L — SIGNIFICANT CHANGE UP (ref 15–37)
BASOPHILS # BLD AUTO: 0.05 K/UL — SIGNIFICANT CHANGE UP (ref 0–0.2)
BASOPHILS NFR BLD AUTO: 0.8 % — SIGNIFICANT CHANGE UP (ref 0–2)
BILIRUB SERPL-MCNC: 0.6 MG/DL — SIGNIFICANT CHANGE UP (ref 0.2–1.2)
BUN SERPL-MCNC: 18 MG/DL — SIGNIFICANT CHANGE UP (ref 7–23)
CALCIUM SERPL-MCNC: 8.6 MG/DL — SIGNIFICANT CHANGE UP (ref 8.5–10.1)
CHLORIDE SERPL-SCNC: 106 MMOL/L — SIGNIFICANT CHANGE UP (ref 96–108)
CO2 SERPL-SCNC: 25 MMOL/L — SIGNIFICANT CHANGE UP (ref 22–31)
CREAT SERPL-MCNC: 0.87 MG/DL — SIGNIFICANT CHANGE UP (ref 0.5–1.3)
EOSINOPHIL # BLD AUTO: 0.31 K/UL — SIGNIFICANT CHANGE UP (ref 0–0.5)
EOSINOPHIL NFR BLD AUTO: 4.9 % — SIGNIFICANT CHANGE UP (ref 0–6)
FLU A RESULT: SIGNIFICANT CHANGE UP
FLU A RESULT: SIGNIFICANT CHANGE UP
FLUAV AG NPH QL: SIGNIFICANT CHANGE UP
FLUBV AG NPH QL: SIGNIFICANT CHANGE UP
GLUCOSE SERPL-MCNC: 98 MG/DL — SIGNIFICANT CHANGE UP (ref 70–99)
HCT VFR BLD CALC: 50 % — SIGNIFICANT CHANGE UP (ref 39–50)
HGB BLD-MCNC: 15.5 G/DL — SIGNIFICANT CHANGE UP (ref 13–17)
IMM GRANULOCYTES NFR BLD AUTO: 0.2 % — SIGNIFICANT CHANGE UP (ref 0–1.5)
INR BLD: 1.17 RATIO — HIGH (ref 0.88–1.16)
LACTATE SERPL-SCNC: 2.4 MMOL/L — HIGH (ref 0.7–2)
LYMPHOCYTES # BLD AUTO: 2.16 K/UL — SIGNIFICANT CHANGE UP (ref 1–3.3)
LYMPHOCYTES # BLD AUTO: 34.3 % — SIGNIFICANT CHANGE UP (ref 13–44)
MCHC RBC-ENTMCNC: 26.1 PG — LOW (ref 27–34)
MCHC RBC-ENTMCNC: 31 GM/DL — LOW (ref 32–36)
MCV RBC AUTO: 84.2 FL — SIGNIFICANT CHANGE UP (ref 80–100)
MONOCYTES # BLD AUTO: 0.5 K/UL — SIGNIFICANT CHANGE UP (ref 0–0.9)
MONOCYTES NFR BLD AUTO: 7.9 % — SIGNIFICANT CHANGE UP (ref 2–14)
NEUTROPHILS # BLD AUTO: 3.27 K/UL — SIGNIFICANT CHANGE UP (ref 1.8–7.4)
NEUTROPHILS NFR BLD AUTO: 51.9 % — SIGNIFICANT CHANGE UP (ref 43–77)
NRBC # BLD: 0 /100 WBCS — SIGNIFICANT CHANGE UP (ref 0–0)
NT-PROBNP SERPL-SCNC: <5 PG/ML — SIGNIFICANT CHANGE UP (ref 0–125)
PLATELET # BLD AUTO: 312 K/UL — SIGNIFICANT CHANGE UP (ref 150–400)
POTASSIUM SERPL-MCNC: 4 MMOL/L — SIGNIFICANT CHANGE UP (ref 3.5–5.3)
POTASSIUM SERPL-SCNC: 4 MMOL/L — SIGNIFICANT CHANGE UP (ref 3.5–5.3)
PROT SERPL-MCNC: 7.3 GM/DL — SIGNIFICANT CHANGE UP (ref 6–8.3)
PROTHROM AB SERPL-ACNC: 13.2 SEC — HIGH (ref 10–12.9)
RBC # BLD: 5.94 M/UL — HIGH (ref 4.2–5.8)
RBC # FLD: 13 % — SIGNIFICANT CHANGE UP (ref 10.3–14.5)
RSV RESULT: SIGNIFICANT CHANGE UP
RSV RNA RESP QL NAA+PROBE: SIGNIFICANT CHANGE UP
SODIUM SERPL-SCNC: 138 MMOL/L — SIGNIFICANT CHANGE UP (ref 135–145)
WBC # BLD: 6.3 K/UL — SIGNIFICANT CHANGE UP (ref 3.8–10.5)
WBC # FLD AUTO: 6.3 K/UL — SIGNIFICANT CHANGE UP (ref 3.8–10.5)

## 2019-02-19 PROCEDURE — 71045 X-RAY EXAM CHEST 1 VIEW: CPT | Mod: 26

## 2019-02-19 PROCEDURE — 99284 EMERGENCY DEPT VISIT MOD MDM: CPT

## 2019-02-19 PROCEDURE — 70450 CT HEAD/BRAIN W/O DYE: CPT | Mod: 26

## 2019-02-19 RX ORDER — AZITHROMYCIN 500 MG/1
500 TABLET, FILM COATED ORAL ONCE
Qty: 0 | Refills: 0 | Status: COMPLETED | OUTPATIENT
Start: 2019-02-19 | End: 2019-02-19

## 2019-02-19 RX ORDER — ACETAMINOPHEN 500 MG
650 TABLET ORAL ONCE
Qty: 0 | Refills: 0 | Status: COMPLETED | OUTPATIENT
Start: 2019-02-19 | End: 2019-02-19

## 2019-02-19 RX ORDER — MORPHINE SULFATE 50 MG/1
2 CAPSULE, EXTENDED RELEASE ORAL ONCE
Qty: 0 | Refills: 0 | Status: DISCONTINUED | OUTPATIENT
Start: 2019-02-19 | End: 2019-02-19

## 2019-02-19 RX ORDER — AZITHROMYCIN 500 MG/1
1 TABLET, FILM COATED ORAL
Qty: 4 | Refills: 0
Start: 2019-02-19 | End: 2019-02-22

## 2019-02-19 RX ORDER — ALBUTEROL 90 UG/1
2 AEROSOL, METERED ORAL
Qty: 1 | Refills: 0
Start: 2019-02-19 | End: 2019-02-25

## 2019-02-19 RX ORDER — SODIUM CHLORIDE 9 MG/ML
1000 INJECTION INTRAMUSCULAR; INTRAVENOUS; SUBCUTANEOUS ONCE
Qty: 0 | Refills: 0 | Status: COMPLETED | OUTPATIENT
Start: 2019-02-19 | End: 2019-02-19

## 2019-02-19 RX ADMIN — AZITHROMYCIN 500 MILLIGRAM(S): 500 TABLET, FILM COATED ORAL at 17:20

## 2019-02-19 RX ADMIN — Medication 650 MILLIGRAM(S): at 18:41

## 2019-02-19 RX ADMIN — MORPHINE SULFATE 2 MILLIGRAM(S): 50 CAPSULE, EXTENDED RELEASE ORAL at 17:28

## 2019-02-19 RX ADMIN — Medication 650 MILLIGRAM(S): at 17:20

## 2019-02-19 RX ADMIN — SODIUM CHLORIDE 1000 MILLILITER(S): 9 INJECTION INTRAMUSCULAR; INTRAVENOUS; SUBCUTANEOUS at 16:43

## 2019-02-19 RX ADMIN — MORPHINE SULFATE 2 MILLIGRAM(S): 50 CAPSULE, EXTENDED RELEASE ORAL at 18:41

## 2019-02-19 RX ADMIN — SODIUM CHLORIDE 1000 MILLILITER(S): 9 INJECTION INTRAMUSCULAR; INTRAVENOUS; SUBCUTANEOUS at 15:31

## 2019-02-19 NOTE — ED ADULT NURSE NOTE - OBJECTIVE STATEMENT
Pt is a 38YOM who is here with flu-like symptoms. Pt states he has been short of breath, with aches and pains, pt states that he has also been complaining of headaches and cough. Pt states that he was treated for pneumonia in September and states he feels the same way he did back then. Pt denies any pain, fevers, chills, nausea, vomiting.

## 2019-02-19 NOTE — ED ADULT TRIAGE NOTE - CHIEF COMPLAINT QUOTE
pt complaining of cough, shortness of breath, headache, nausea and body aches times 3 days. history of pna and parkinson's disease

## 2019-02-19 NOTE — ED ADULT NURSE NOTE - CAS EDN DISCHARGE ASSESSMENT
Alert and oriented to person, place and time/Dressing clean and dry/No adverse reaction to first time med in ED/Symptoms improved

## 2019-02-19 NOTE — ED ADULT NURSE NOTE - NSIMPLEMENTINTERV_GEN_ALL_ED
Implemented All Fall Risk Interventions:  [place] to call system. Call bell, personal items and telephone within reach. Instruct patient to call for assistance. Room bathroom lighting operational. Non-slip footwear when patient is off stretcher. Physically safe environment: no spills, clutter or unnecessary equipment. Stretcher in lowest position, wheels locked, appropriate side rails in place. Provide visual cue, wrist band, yellow gown, etc. Monitor gait and stability. Monitor for mental status changes and reorient to person, place, and time. Review medications for side effects contributing to fall risk. Reinforce activity limits and safety measures with patient and family.

## 2019-02-19 NOTE — ED PROVIDER NOTE - CARE PLAN
Principal Discharge DX:	URI (upper respiratory infection) Principal Discharge DX:	URI (upper respiratory infection)  Secondary Diagnosis:	Asthma

## 2019-02-19 NOTE — ED ADULT NURSE NOTE - CAS TRG GEN SKIN CONDITION
**Below number listed is for a [employer]**  Correct number is listed above: [phone]    Tried calling [facility] number to inform them should go through medical records to have any requested records faxed #[phone]  Was on hold for several minutes with no response   Will need to recall    Yamilet ABREU RN     Dry/Warm

## 2019-02-19 NOTE — ED PROVIDER NOTE - OBJECTIVE STATEMENT
37yo male with history of asthma, parkinson's disease presents with body weakness, coughing, wheezing for last 4-5 days. Patient denies fever, chils, nausea, vomiting, abdominal pain, recent travel, rash, sob. 39yo male with history of asthma, parkinson's disease presents with body weakness, coughing, wheezing for last 4-5 days. Patient denies fever, chils, nausea, vomiting, abdominal pain, recent travel, rash, sob, dirrhea.

## 2019-05-01 ENCOUNTER — OUTPATIENT (OUTPATIENT)
Dept: OUTPATIENT SERVICES | Facility: HOSPITAL | Age: 39
LOS: 1 days | End: 2019-05-01

## 2019-05-01 DIAGNOSIS — Z98.890 OTHER SPECIFIED POSTPROCEDURAL STATES: Chronic | ICD-10-CM

## 2019-05-23 DIAGNOSIS — Z71.89 OTHER SPECIFIED COUNSELING: ICD-10-CM

## 2019-08-14 ENCOUNTER — EMERGENCY (EMERGENCY)
Facility: HOSPITAL | Age: 39
LOS: 0 days | Discharge: HOME | End: 2019-08-15
Attending: EMERGENCY MEDICINE
Payer: MEDICARE

## 2019-08-14 VITALS
HEIGHT: 67 IN | WEIGHT: 167.99 LBS | HEART RATE: 89 BPM | RESPIRATION RATE: 18 BRPM | TEMPERATURE: 98 F | SYSTOLIC BLOOD PRESSURE: 119 MMHG | OXYGEN SATURATION: 98 % | DIASTOLIC BLOOD PRESSURE: 73 MMHG

## 2019-08-14 DIAGNOSIS — Z98.890 OTHER SPECIFIED POSTPROCEDURAL STATES: Chronic | ICD-10-CM

## 2019-08-14 DIAGNOSIS — R06.02 SHORTNESS OF BREATH: ICD-10-CM

## 2019-08-14 DIAGNOSIS — G20 PARKINSON'S DISEASE: ICD-10-CM

## 2019-08-14 DIAGNOSIS — M19.90 UNSPECIFIED OSTEOARTHRITIS, UNSPECIFIED SITE: ICD-10-CM

## 2019-08-14 PROCEDURE — 99285 EMERGENCY DEPT VISIT HI MDM: CPT

## 2019-08-14 NOTE — ED ADULT NURSE NOTE - OBJECTIVE STATEMENT
Pt is a 38YOM who is here with flu-like symptoms. Pt states he has been short of breath, with aches and pains, pt states that he has also been complaining of headaches and cough. Pt states that he was treated for pneumonia in September and states he feels the same way he did back then. Pt denies any pain, fevers, chills, nausea, vomiting. Pt is a 38YOM who is here with flu-like symptoms. Pt states he has been short of breath, with aches and pains, pt states that he has also been complaining of headaches. Pt states that he was treated for pneumonia in September and states he feels the same way he did back then. Pt denies any pain, fevers, chills, nausea, vomiting. Pt c/o difficulty breathing. H/o asthma, and parkinson's disease. Pt stated he has a deep brain stimulator for parkinson's.  left lower lobe wheezing.. AOX4 with slow- clear speech

## 2019-08-14 NOTE — ED ADULT NURSE NOTE - ED STAT RN HANDOFF DETAILS
Report received from RN at this time. Assessment available on [facility]. will continue to monitor

## 2019-08-14 NOTE — ED ADULT NURSE NOTE - NSIMPLEMENTINTERV_GEN_ALL_ED
Implemented All Fall Risk Interventions:  [place] to call system. Call bell, personal items and telephone within reach. Instruct patient to call for assistance. Room bathroom lighting operational. Non-slip footwear when patient is off stretcher. Physically safe environment: no spills, clutter or unnecessary equipment. Stretcher in lowest position, wheels locked, appropriate side rails in place. Provide visual cue, wrist band, yellow gown, etc. Monitor gait and stability. Monitor for mental status changes and reorient to person, place, and time. Review medications for side effects contributing to fall risk. Reinforce activity limits and safety measures with patient and family.

## 2019-08-14 NOTE — ED ADULT TRIAGE NOTE - CHIEF COMPLAINT QUOTE
Pt c/o difficulty breathing. H/o asthma, and parkinson's disease. Pt stated he has a deep brain stimulator for parkinson's.  left lower lobe wheezing.

## 2019-08-15 VITALS
OXYGEN SATURATION: 99 % | TEMPERATURE: 98 F | SYSTOLIC BLOOD PRESSURE: 122 MMHG | HEART RATE: 69 BPM | DIASTOLIC BLOOD PRESSURE: 89 MMHG | RESPIRATION RATE: 12 BRPM

## 2019-08-15 LAB
ALBUMIN SERPL ELPH-MCNC: 3.8 G/DL — SIGNIFICANT CHANGE UP (ref 3.3–5)
ALP SERPL-CCNC: 71 U/L — SIGNIFICANT CHANGE UP (ref 40–120)
ALT FLD-CCNC: 11 U/L — LOW (ref 12–78)
ANION GAP SERPL CALC-SCNC: 7 MMOL/L — SIGNIFICANT CHANGE UP (ref 5–17)
APTT BLD: 38.2 SEC — HIGH (ref 28.5–37)
AST SERPL-CCNC: 16 U/L — SIGNIFICANT CHANGE UP (ref 15–37)
BASOPHILS # BLD AUTO: 0.06 K/UL — SIGNIFICANT CHANGE UP (ref 0–0.2)
BASOPHILS NFR BLD AUTO: 0.8 % — SIGNIFICANT CHANGE UP (ref 0–2)
BILIRUB SERPL-MCNC: 0.6 MG/DL — SIGNIFICANT CHANGE UP (ref 0.2–1.2)
BUN SERPL-MCNC: 15 MG/DL — SIGNIFICANT CHANGE UP (ref 7–23)
CALCIUM SERPL-MCNC: 8.6 MG/DL — SIGNIFICANT CHANGE UP (ref 8.5–10.1)
CHLORIDE SERPL-SCNC: 107 MMOL/L — SIGNIFICANT CHANGE UP (ref 96–108)
CO2 SERPL-SCNC: 27 MMOL/L — SIGNIFICANT CHANGE UP (ref 22–31)
CREAT SERPL-MCNC: 0.9 MG/DL — SIGNIFICANT CHANGE UP (ref 0.5–1.3)
D DIMER BLD IA.RAPID-MCNC: <150 NG/ML DDU — SIGNIFICANT CHANGE UP
EOSINOPHIL # BLD AUTO: 0.28 K/UL — SIGNIFICANT CHANGE UP (ref 0–0.5)
EOSINOPHIL NFR BLD AUTO: 3.8 % — SIGNIFICANT CHANGE UP (ref 0–6)
GLUCOSE SERPL-MCNC: 91 MG/DL — SIGNIFICANT CHANGE UP (ref 70–99)
HCT VFR BLD CALC: 47.1 % — SIGNIFICANT CHANGE UP (ref 39–50)
HGB BLD-MCNC: 14.9 G/DL — SIGNIFICANT CHANGE UP (ref 13–17)
IMM GRANULOCYTES NFR BLD AUTO: 0.4 % — SIGNIFICANT CHANGE UP (ref 0–1.5)
INR BLD: 1.06 RATIO — SIGNIFICANT CHANGE UP (ref 0.88–1.16)
LYMPHOCYTES # BLD AUTO: 2.7 K/UL — SIGNIFICANT CHANGE UP (ref 1–3.3)
LYMPHOCYTES # BLD AUTO: 36.4 % — SIGNIFICANT CHANGE UP (ref 13–44)
MCHC RBC-ENTMCNC: 26.7 PG — LOW (ref 27–34)
MCHC RBC-ENTMCNC: 31.6 GM/DL — LOW (ref 32–36)
MCV RBC AUTO: 84.4 FL — SIGNIFICANT CHANGE UP (ref 80–100)
MONOCYTES # BLD AUTO: 0.54 K/UL — SIGNIFICANT CHANGE UP (ref 0–0.9)
MONOCYTES NFR BLD AUTO: 7.3 % — SIGNIFICANT CHANGE UP (ref 2–14)
NEUTROPHILS # BLD AUTO: 3.81 K/UL — SIGNIFICANT CHANGE UP (ref 1.8–7.4)
NEUTROPHILS NFR BLD AUTO: 51.3 % — SIGNIFICANT CHANGE UP (ref 43–77)
NRBC # BLD: 0 /100 WBCS — SIGNIFICANT CHANGE UP (ref 0–0)
NT-PROBNP SERPL-SCNC: <5 PG/ML — SIGNIFICANT CHANGE UP (ref 0–125)
PLATELET # BLD AUTO: 320 K/UL — SIGNIFICANT CHANGE UP (ref 150–400)
POTASSIUM SERPL-MCNC: 3.9 MMOL/L — SIGNIFICANT CHANGE UP (ref 3.5–5.3)
POTASSIUM SERPL-SCNC: 3.9 MMOL/L — SIGNIFICANT CHANGE UP (ref 3.5–5.3)
PROT SERPL-MCNC: 7.3 GM/DL — SIGNIFICANT CHANGE UP (ref 6–8.3)
PROTHROM AB SERPL-ACNC: 11.9 SEC — SIGNIFICANT CHANGE UP (ref 10–12.9)
RBC # BLD: 5.58 M/UL — SIGNIFICANT CHANGE UP (ref 4.2–5.8)
RBC # FLD: 13.2 % — SIGNIFICANT CHANGE UP (ref 10.3–14.5)
SODIUM SERPL-SCNC: 141 MMOL/L — SIGNIFICANT CHANGE UP (ref 135–145)
TROPONIN I SERPL-MCNC: <.015 NG/ML — SIGNIFICANT CHANGE UP (ref 0.01–0.04)
WBC # BLD: 7.42 K/UL — SIGNIFICANT CHANGE UP (ref 3.8–10.5)
WBC # FLD AUTO: 7.42 K/UL — SIGNIFICANT CHANGE UP (ref 3.8–10.5)

## 2019-08-15 PROCEDURE — 93010 ELECTROCARDIOGRAM REPORT: CPT

## 2019-08-15 PROCEDURE — 71046 X-RAY EXAM CHEST 2 VIEWS: CPT | Mod: 26

## 2019-08-15 RX ORDER — ALBUTEROL 90 UG/1
2 AEROSOL, METERED ORAL
Qty: 1 | Refills: 0
Start: 2019-08-15 | End: 2019-09-13

## 2019-08-15 RX ORDER — IPRATROPIUM/ALBUTEROL SULFATE 18-103MCG
3 AEROSOL WITH ADAPTER (GRAM) INHALATION ONCE
Refills: 0 | Status: COMPLETED | OUTPATIENT
Start: 2019-08-15 | End: 2019-08-15

## 2019-08-15 RX ADMIN — Medication 3 MILLILITER(S): at 01:30

## 2019-08-15 RX ADMIN — Medication 3 MILLILITER(S): at 02:10

## 2019-08-15 NOTE — ED PROVIDER NOTE - CLINICAL SUMMARY MEDICAL DECISION MAKING FREE TEXT BOX
Patient with shortness of breath after cat exposure.  VSS.  Lab values reviewed, there are no values which require acute intervention.  CXR with residual RLL infiltrate improved from prior.  Denies cough, change in sputum, no WBC.  Was said to be wheezing in triage.  Feels "a WHOLE lot better" after nebulizers.  Dimer neg, no other features to suggest PE.  Will DC with inhaler and pulm referral.  Discussed results and outcome of today's visit with the patient.  Patient advised to please follow up with another healthcare provider within the next 24 hours and return to the Emergency Department for worsening symptoms or any other concerns.  Patient advised that their doctor may call  to follow up on the specific results of the tests performed today in the emergency department.   Patient appears well on discharge.

## 2019-08-15 NOTE — ED PROVIDER NOTE - PHYSICAL EXAMINATION
Gen: Alert, NAD, well appearing  Head: NC, AT, PERRL, EOMI, normal lids/conjunctiva  ENT: normal hearing, patent oropharynx without erythema/exudate, uvula midline  Neck: +supple, no tenderness/meningismus/JVD, +Trachea midline  Pulm: Bilateral BS, normal resp effort, no wheeze/stridor/retractions  CV: RRR, no M/R/G, +dist pulses  Abd: soft, NT/ND, Negative [place] signs, +BS, no palpable masses  Mskel: no edema/erythema/cyanosis  Skin: no rash, warm/dry  Neuro: AAOx3, no apparent sensory/motor deficits, coordination intact, Parkinsonian tremor

## 2019-08-15 NOTE — ED PROVIDER NOTE - OBJECTIVE STATEMENT
Pertinent PMH/PSH/FHx/SHx and Review of Systems contained within:  Patient presents to the ED for shortness of breath.  Patient denies cough, chest pain, leg swelling.  Shortness of breath started 2 days ago after he was exposed to cat.  Has asthma, took albuterol at home with some relief.  Denies change in sputum.  Had pneumonia 6 months ago.     ROS: No fever/chills, No headache/photophobia/eye pain/changes in vision, No ear pain/sore throat/dysphagia, No chest pain/palpitations, no stridor, No abdominal pain, No N/V/D/melena, no dysuria/frequency/discharge, No neck/back pain, no rash, no changes in neurological status/function. Pertinent PMH/PSH/FHx/SHx and Review of Systems contained within:  Patient presents to the ED for shortness of breath.  Patient denies cough, chest pain, leg swelling.  Shortness of breath started 2 days ago after he was exposed to cat.  Has asthma, took albuterol at home with some relief.  Denies change in sputum.  Had pneumonia 6 months ago.  Patient denies immobility, is ambulatory.    ROS: No fever/chills, No headache/photophobia/eye pain/changes in vision, No ear pain/sore throat/dysphagia, No chest pain/palpitations, no stridor, No abdominal pain, No N/V/D/melena, no dysuria/frequency/discharge, No neck/back pain, no rash, no changes in neurological status/function.

## 2019-09-09 ENCOUNTER — APPOINTMENT (OUTPATIENT)
Dept: CARDIOLOGY | Facility: CLINIC | Age: 39
End: 2019-09-09

## 2019-10-14 ENCOUNTER — APPOINTMENT (OUTPATIENT)
Dept: NEUROLOGY | Facility: CLINIC | Age: 39
End: 2019-10-14
Payer: MEDICARE

## 2019-10-14 VITALS
SYSTOLIC BLOOD PRESSURE: 111 MMHG | DIASTOLIC BLOOD PRESSURE: 74 MMHG | HEART RATE: 80 BPM | WEIGHT: 172 LBS | HEIGHT: 67 IN | BODY MASS INDEX: 27 KG/M2

## 2019-10-14 PROCEDURE — 99204 OFFICE O/P NEW MOD 45 MIN: CPT

## 2019-10-14 PROCEDURE — 99214 OFFICE O/P EST MOD 30 MIN: CPT

## 2019-10-14 PROCEDURE — 95983 ALYS BRN NPGT PRGRMG 15 MIN: CPT

## 2019-10-14 NOTE — DISCUSSION/SUMMARY
[FreeTextEntry1] : Santos Ely is a 39 year old M with a history of PD since 2013, with symptoms since 2010, initially diagnosed as essential tremor. He is now s/p DBS since 2017/2018. He reports a good overall response to medications and stimulation, with good control over the L side (predominantly affected), except when he experiences dyskinesias after taking his medications. His DBS was adjusted today to help with R arm and leg tremors, and to help with L sided dyskinesias. R sided tremors improved after taking his medications. However, his dyskinesias continued to be significant, despite DBS adjustments. His speech did improve, which was another concern he had, after DBS adjustment.\par \par Refer to scanned document for detailed DBS programming information.\par \par Recommendations:\par - Will start Gocovri for dyskinesias 1 tab at night x 7 days, then increase to 2 tabs at bedtime.\par - Continue Sinemet 25-100mg 1 tab every 3 hours\par \par RTC 1 month

## 2019-10-14 NOTE — HISTORY OF PRESENT ILLNESS
[FreeTextEntry1] : Santos Ely is a 39 year old M with a history of Asthma, PD since 2013, s/p bilateral STN DBS 2017/2018. His symptoms started having symptoms in 2010, and was diagnosed with essential tremor previously. \par \par He reports that his movements are better since the last time he was seen at [facility]. He can get up more independently. Taking showers, dressing and eating independently. Thinks his R side, the voltage needs to be increased because still having tremor in the R arm and leg. He always feels the R leg tremoring. Walking is good, but the R side leg has worsening tremor. He walks on his toes of the R leg. No falls. He has not tried to adjust his DBS to help with his R leg tremor. \par \par When he takes the medication, he gets a lot of dyskinesias, and he gets whole body twitching. Sometimes he has freezing periods of the L hand.Dyskinesias are 30-45 mins in duration. The medication is lasting 3 hours. Does have difficulty talking. When he takes half a tablet, the dyskinesia duration is less, but he gets 2 hours of ON time, rather than 3 hours with 1 tab. His best time is in the morning.\par \par Medications:\par Sinemet 25-100mg 1 tab every 3 hours starting at 10A\par \par Previous meds:\par Rytary - did not feel well on it\par \par Nonmotor symptoms:\par No constipation\par Has urinary frequency\par No hallucinations\par No dizziness when standing\par No trouble sleeping\par Does have talking in sleep, and is snoring a lot, no acting out of dreams\par Memory, short term is impaired, numbers, dates and names are the hardest\par Mood is good. \par \par Social: Not currently working, not exercising\par Family history: history of tremor in the family, but no PD

## 2019-10-14 NOTE — PHYSICAL EXAM
[Motor Handedness Left-Handed] : the patient is left hand dominant [FreeTextEntry1] : OFF meds:\par Mild masked facies, decreased blink rate. EOMI. Resting tremor of the R arm/hand, worse in the R leg. \par Delayed tremor of the L hand with posture. No significant rigidity. Bradykinesia L>R with finger tapping, opening and closing hands and rapid alternating movements. Uses hands to push up from seated position. Decreased arm swing bilaterally, L>R. Good stride length and speed. Impaired heel strike on the R. Postural reflexes intact.\par \par ON meds:\par When the dyskinesias were present about 40 mins after he took his medications, they were significant and left sided, including arm, leg and head turning to the R. There was a dystonic component in the left arm and leg, pronounced when walking.

## 2019-10-14 NOTE — PROCEDURE
[FreeTextEntry1] : See scanned document for DBS programming. \par \par L STN - uses of dDBS improved his speech\par R STN - lowering amplitude as well as utilizing dorsal contact was not effective in controlled LID

## 2019-10-18 ENCOUNTER — RX RENEWAL (OUTPATIENT)
Age: 39
End: 2019-10-18

## 2019-10-23 ENCOUNTER — APPOINTMENT (OUTPATIENT)
Dept: NEUROLOGY | Facility: CLINIC | Age: 39
End: 2019-10-23
Payer: MEDICARE

## 2019-10-23 VITALS
WEIGHT: 172 LBS | SYSTOLIC BLOOD PRESSURE: 130 MMHG | HEART RATE: 90 BPM | DIASTOLIC BLOOD PRESSURE: 87 MMHG | HEIGHT: 67 IN | BODY MASS INDEX: 27 KG/M2

## 2019-10-23 PROCEDURE — 99214 OFFICE O/P EST MOD 30 MIN: CPT

## 2019-10-23 PROCEDURE — 95983 ALYS BRN NPGT PRGRMG 15 MIN: CPT

## 2019-10-23 NOTE — PROCEDURE
[FreeTextEntry1] : Deep Brain Stimulation Programming: Refer to scanned form(s)\par \par \par DBS programming time: 7min

## 2019-10-23 NOTE — DISCUSSION/SUMMARY
[FreeTextEntry1] : PD s/p bilateral STN DBS who was counseled for the following:\par \par 1. DBS changed to mitigate tremor and imbalance\par 2. leave his sinemet regimen the same for his PD\par 3. encourage increased exercising for PD\par \par RTO 2-3months

## 2019-10-23 NOTE — PHYSICAL EXAM
[FreeTextEntry1] : There is mild masking. EOMI. There is 3+ right leg rest tremor. There is 2+ bradykinesia R>L. tone is normal. Walks with better SL and no FOG. Tends to walk on front of his feet

## 2019-10-23 NOTE — HISTORY OF PRESENT ILLNESS
[FreeTextEntry1] : Since his last visit, his speech is clearer and he feels more relaxed. However, he has more right leg tremor and imbalance. His dyskinesia are better in general, but he reduces the R DBS when he experiences it. He also feels that new settings gives in 3hrs of ON time per dose.  \par \par Meds\par sinemet 1 tab q3hrs

## 2019-10-25 ENCOUNTER — RX RENEWAL (OUTPATIENT)
Age: 39
End: 2019-10-25

## 2019-12-09 ENCOUNTER — APPOINTMENT (OUTPATIENT)
Dept: NEUROLOGY | Facility: CLINIC | Age: 39
End: 2019-12-09
Payer: MEDICARE

## 2019-12-09 PROCEDURE — 99214 OFFICE O/P EST MOD 30 MIN: CPT

## 2019-12-09 PROCEDURE — 95983 ALYS BRN NPGT PRGRMG 15 MIN: CPT

## 2019-12-09 RX ORDER — AMANTADINE 137 MG/1
137 CAPSULE, COATED PELLETS ORAL
Qty: 60 | Refills: 5 | Status: DISCONTINUED | COMMUNITY
Start: 2019-10-14 | End: 2019-12-09

## 2019-12-10 NOTE — HISTORY OF PRESENT ILLNESS
[FreeTextEntry1] : Since the last visit, he reports feeling more off balance. He fell once going down the stairs on 11/21/2019. He reports that his pitch of his voice is better, but he has to use a lot of effort to speak since the last DBS programming. He reports that he still has trouble with balance. He is tripping a lot. He reports that after the programming on 10/14/2019 his speech was perfect. He feels weak, lethargic, and is sleeping a lot. He is afraid he is going to fall whenever he tries to walk. He reports that he does not have strength. He states his dyskinesias have improved. He has not started Gocovri because insurance denied it. When he gets dyskinesias he decreases the stimulator which helps. He reports more left sided shaking. When the medication kicks in his right foot is still on his tip toes. He has frequent headaches with nausea. Medication is lasting 2 hours since he fell, and before it was 3-4 hours. He has become compulsive again, per his wife, and she tries to hide the keys from him.\par \par Meds\par sinemet 1 tab q3hrs, though he tries to delay his doses

## 2019-12-10 NOTE — END OF VISIT
[>50% of Time Spent on Counseling for ____] : Greater than 50% of the encounter time was spent on counseling for [unfilled] [Time Spent: ___ minutes] : I have spent [unfilled] minutes of face to face time with the patient [] : Fellow [FreeTextEntry3] : Patient with increased imbalance since last programming. Speech is better in general. Suffered 1 fall on the stairs with no LOC. Hit his head and IPG. He has had a mild intermittent HA since then with no other associated symptoms. He has not been taking his levodopa as recommended and spaces doses out by 4-6hrs. Wife reports that he is more obsessed about certain activities and compulsively watches TV at 3AM. \par \par On exam, there is facial masking. Speech is slow and slightly slurred. There is no tremor. Richard 2-3+ Walks shortened SL and on the front of his feet. Turns en bloc. Overall increased bradykinesia noted\par \par DBS- Left: switched back to 2AB- with worsening of his speech. Reduction in PW to 40 improved his speech\par At times during the programming adjustments, patient had intermittent twitches in his neck with fixed posturing of left hand that appeared nonorganic in nature. \par \par Plan\par - DBS was changed as outlined. \par - resume original  medication regimen\par - refer back to Dr. Ambrose for neurobehavioral changes\par \par

## 2019-12-10 NOTE — PROCEDURE
[FreeTextEntry1] : Deep Brain Stimulation Programming: Refer to scanned form(s)\par \par Programming time 15mins

## 2019-12-10 NOTE — PHYSICAL EXAM
[FreeTextEntry1] : There is mild masking. EOMI. Speech is intermittently dysarthric, but comprehensible. There is intermittent, 2+ right leg rest tremor. There is 2+ bradykinesia R>L. Foot tapping worse on the left. tone is normal. Walks with better SL and no FOG. Tends to walk on the front of his feet\par

## 2019-12-10 NOTE — DISCUSSION/SUMMARY
[FreeTextEntry1] : Young onset PD s/p bilateral STN DBS who presents for follow up after having a fall down the stairs on 11/21/2019. It is unclear whether his symptoms are related to stimulation settings vs. more psychologically triggered. When his stimulation was completely turned off there was not much change in his speech. His last dose of Sinemet prior to this visit was at 330AM. Dyskinesias are better controlled:\par \par 1. DBS changed to mitigate speech and imbalance by decreasing pulse width on the left, no changes to right side. However, he developed neck twitching and his amplitude was returned back to original setting, but with lower pulse width. His speech and walking did not change much with this change.\par 2. Leave his sinemet regimen the same for his PD, 25-100mg 1 tab q3h.\par 3. Encouraged increased exercising for PD. Referral for PT provided for gait training.\par 4. Sleep hygiene advised (not to wake up and start watching movies). Monitor for improvement in lethargy.\par 5. Follow up with Dr. Ambrose for compulsive behaviours\par \par RTO 2-3 months

## 2020-02-10 ENCOUNTER — APPOINTMENT (OUTPATIENT)
Dept: NEUROLOGY | Facility: CLINIC | Age: 40
End: 2020-02-10
Payer: MEDICARE

## 2020-02-10 VITALS
BODY MASS INDEX: 27 KG/M2 | HEART RATE: 91 BPM | WEIGHT: 172 LBS | DIASTOLIC BLOOD PRESSURE: 77 MMHG | HEIGHT: 67 IN | SYSTOLIC BLOOD PRESSURE: 114 MMHG

## 2020-02-10 PROCEDURE — 95983 ALYS BRN NPGT PRGRMG 15 MIN: CPT

## 2020-02-10 PROCEDURE — 99214 OFFICE O/P EST MOD 30 MIN: CPT | Mod: 25

## 2020-02-10 NOTE — PROCEDURE
[FreeTextEntry1] : Deep Brain Stimulation Programming: Refer to scanned form(s)\par \par Programming time 5 minutes

## 2020-02-10 NOTE — DISCUSSION/SUMMARY
[FreeTextEntry1] : PD with improved LID and motor fluctuations on current DBS settings. speech is stable\par Will leave his sinemet regimen the same and focus therapy on PT/OT and speech at this time. He does not want to f/u with Dr. Ambrose and his wife will let me know if neurobehavioral issues worsen. \par \par RTO 3months

## 2020-02-10 NOTE — HISTORY OF PRESENT ILLNESS
[FreeTextEntry1] : Since his last visit, he preferred to keep DBS at the original settings as the new setting was causing breakthrough tremor. Reports that two of his doses of c/l  are 1/2 tab as it tends to control dyskinesia. He feels weak in general and started home PT. He has not had any falls. Speech is overall better. \par \par he gets agitated easily. His wife reports persistent underlying compulsive behaviors which patient denies. She does not elaborate further. He is Sleeping well\par \par Meds\par sinemet 1 tab q3hrs

## 2020-02-10 NOTE — PHYSICAL EXAM
[FreeTextEntry1] : There is mild masking. EOMI. Speech is  mild-moderately hypophonic. There is intermittent, 2+ right leg rest tremor. There is 2+ bradykinesia R>L. . tone is normal. Walks with better SL and no FOG. Tends to walk on the front of his feet\par

## 2020-03-09 ENCOUNTER — APPOINTMENT (OUTPATIENT)
Dept: NEUROLOGY | Facility: CLINIC | Age: 40
End: 2020-03-09
Payer: MEDICARE

## 2020-03-09 VITALS
BODY MASS INDEX: 25.43 KG/M2 | TEMPERATURE: 97.7 F | DIASTOLIC BLOOD PRESSURE: 93 MMHG | SYSTOLIC BLOOD PRESSURE: 142 MMHG | WEIGHT: 162 LBS | HEIGHT: 67 IN | HEART RATE: 101 BPM

## 2020-03-09 PROCEDURE — 95983 ALYS BRN NPGT PRGRMG 15 MIN: CPT

## 2020-03-09 PROCEDURE — 99214 OFFICE O/P EST MOD 30 MIN: CPT

## 2020-03-09 NOTE — DISCUSSION/SUMMARY
[FreeTextEntry1] : PD with improved motoric status on new DBS parameters. DBS adjusted to improvement in his tremor and left side bradykinesia. Advised him to see his PCP re. weightloss. Leave sinemet regimen the same. Will consider ER qhs or rytary qhs if morning hypokinesia does not improve with DBS changes. \par \par RTO 3months

## 2020-03-09 NOTE — PROCEDURE
[FreeTextEntry1] : Deep Brain Stimulation Programming: Refer to scanned form(s)\par \par Programming time 7 mins

## 2020-03-09 NOTE — HISTORY OF PRESENT ILLNESS
[FreeTextEntry1] : Since his last visit, has breakthrough left hand and right leg tremor. The tremor does not impact him functionally, but is bothered by it socially. He has not had any falls. Balance feels stable. Wife says that patient has been sleeping more and has lost 10lbs in past month. Feels slow in the mornings. \par \par Meds\par sinemet 1 tab q3hrs

## 2020-03-09 NOTE — PHYSICAL EXAM
[FreeTextEntry1] : There is mild masking. EOMI. Speech is fluent. There is intermittent left postural hand tremor. There is 2+ bradykinesia L>R. Leg taps 2+.  Walks with better SL and no FOG. Tends to walk on the front of his feet\par

## 2020-05-10 NOTE — ED ADULT TRIAGE NOTE - NS ED NOTE AC HIGH RISK COUNTRIES
Follow up with any recommended [facility]ologist that is connected within your insurance provider as advised from your primary care physician.    NO specific Oncologist referral, please review with your Primary Care Physician.  
No

## 2020-07-20 ENCOUNTER — APPOINTMENT (OUTPATIENT)
Dept: NEUROLOGY | Facility: CLINIC | Age: 40
End: 2020-07-20

## 2020-09-17 NOTE — ED ADULT NURSE NOTE - CAS EDN DISCHARGE INTERVENTIONS
Nausea is \"better\" after laying on left side and passing flatus. Denies need to void \"I'll try again in a bit\". IV discontinued, cath removed intact

## 2020-09-22 ENCOUNTER — APPOINTMENT (OUTPATIENT)
Dept: NEUROLOGY | Facility: CLINIC | Age: 40
End: 2020-09-22
Payer: MEDICARE

## 2020-09-22 VITALS — TEMPERATURE: 97.8 F

## 2020-09-22 VITALS
SYSTOLIC BLOOD PRESSURE: 126 MMHG | HEIGHT: 67 IN | WEIGHT: 164 LBS | HEART RATE: 73 BPM | DIASTOLIC BLOOD PRESSURE: 83 MMHG | BODY MASS INDEX: 25.74 KG/M2

## 2020-09-22 DIAGNOSIS — R51 HEADACHE: ICD-10-CM

## 2020-09-22 PROCEDURE — 95983 ALYS BRN NPGT PRGRMG 15 MIN: CPT

## 2020-09-22 PROCEDURE — 99214 OFFICE O/P EST MOD 30 MIN: CPT | Mod: 25

## 2020-09-22 NOTE — HISTORY OF PRESENT ILLNESS
[FreeTextEntry1] : Balance has been good\par Speech fluctuates and has difficulty pronouncing words at times\par + drooling occasionally\par Tends to keep his knees bent when walking. \par Wife says that he takes extra doses of sinemet during the day. Patient reports that sinemet improves his mental clarity\par He gets dyskinesia for several minutes after each dose\par experiences occasional HAs which he needs NSAIDs for \par \par Meds\par sinemet 1 tab q3hrs

## 2020-09-22 NOTE — PHYSICAL EXAM
[FreeTextEntry1] : Last l-dopa dose 8hrs\par \par There is mild masking. EOMI. Speech is comprehensible and more fluent. There is intermittent, 2+ right leg rest tremor. There is 2+ bradykinesia R>L. Foot tapping worse on the left. tone is normal. Walks with better SL and no FOG. knees are partially flexed when walking. \par

## 2020-09-22 NOTE — END OF VISIT
[facility] [address]  [address]  [address] [place] [address] [id number]  [phone]               Thank you for choosing us for your health care visit with Riana Ruano PA-C.   We are glad to serve you and happy to provide you with this sum [Time Spent: ___ minutes] : I have spent [unfilled] minutes of time on the encounter. · Suck on throat lozenges, cough drops, hard candy, ice chips, or frozen fruit-juice bars. Use the sugar-free versions if your diet or medical condition requires them. Gargle to ease irritation  Gargling every hour or 2 can ease irritation.  Try gargling w Pharyngitis (Sore Throat), Report Pending    Pharyngitis (sore throat) is often due to a virus. It can also be caused by the streptococcus, or strep, bacterium, often called strep throat.  Both viral and strep infections can cause throat pain that is worse [>50% of the face to face encounter time was spent on counseling and/or coordination of care for ___] : Greater than 50% of the face to face encounter time was spent on counseling and/or coordination of care for [unfilled] had a stomach ulcer or GI bleeding. Never give aspirin to a child under 25years of age who is ill with a fever. It may cause severe liver damage.   · For adults: Use acetaminophen or ibuprofen to control pain or fever, unless another medicine was prescribe 73259. All rights reserved. This information is not intended as a substitute for professional medical care. Always follow your healthcare professional's instructions.         Viral Upper Respiratory Illness (Adult)  You have a viral upper respiratory illnes decongestants if you have high blood pressure.)  Follow-up care  Follow up with your healthcare provider, or as advised.   When to seek medical advice  Call your healthcare provider right away if any of these occur:  · Cough with lots of colored sputum (muc Dietary sodium reduction Reduce dietary sodium intake to <= 100 mmol per day (2.4 g sodium or 6 g sodium chloride)   Aerobic physical activity Regular aerobic physical activity (e.g., brisk walking, light jogging, cycling, swimming, etc.) for a goal of at

## 2020-09-22 NOTE — PROCEDURE
[FreeTextEntry1] : Left DBS\par Contacts: 2B  old\par Parameters: 3.5/50/180\par Impedances:ok\par Battery:\par \par Notes:\par \par Final Settings: 3.2/50/180 new \par \par Right  DBS\par Contacts:10A-3+\par Parameters: 2.2/70/130\par Impedances:ok\par Battery:\par \par Notes: 2.4--triggers dyskinesia\par \par Final Settings: unchanged\par \par \par DBS programming time(mins):10\par

## 2020-09-22 NOTE — DISCUSSION/SUMMARY
[FreeTextEntry1] : PD with good tremor control with some stim related speech issues\par Gait disorder \par ?DDS\par \par Patient was counseled on the following recommendations:\par \par DBS changed as outlined\par advised him to reduce levodopa to 1/2 tab doses \par increase exercises\par \par f/.u 4months

## 2020-12-08 ENCOUNTER — APPOINTMENT (OUTPATIENT)
Dept: NEUROLOGY | Facility: CLINIC | Age: 40
End: 2020-12-08
Payer: MEDICARE

## 2020-12-08 VITALS
SYSTOLIC BLOOD PRESSURE: 127 MMHG | DIASTOLIC BLOOD PRESSURE: 82 MMHG | WEIGHT: 164 LBS | HEIGHT: 67 IN | HEART RATE: 85 BPM | BODY MASS INDEX: 25.74 KG/M2

## 2020-12-08 VITALS — TEMPERATURE: 96.5 F

## 2020-12-08 PROCEDURE — 95983 ALYS BRN NPGT PRGRMG 15 MIN: CPT

## 2020-12-08 PROCEDURE — 99214 OFFICE O/P EST MOD 30 MIN: CPT

## 2020-12-08 NOTE — DISCUSSION/SUMMARY
[FreeTextEntry1] : PD with good tremor control with some stim related speech issues\par Gait disorder \par \par \par Patient was counseled on the following recommendations:\par \par DBS adjusted today. Increases in amplitude for right hemibody improves his gait and tremor\par "New" works best for his speech \par He should cont sinemet regimen and increase physical activity\par \par f/u 3months\par

## 2020-12-08 NOTE — HISTORY OF PRESENT ILLNESS
[FreeTextEntry1] : Patient reports that new setting help his speech but neg impacts his gait\par He prefers original settings for his gait and balance.\par Reports right leg tremor in the mornings that are bothersome\par does not have much LID\par Gets about 2hrs of ON time with each dose of sinemet\par \par Meds\par sinemet 1 tab q3hrs

## 2020-12-08 NOTE — PHYSICAL EXAM
[FreeTextEntry1] : There is mild masking. EOMI. Speech is fluent. 2+. There is  2+ right leg rest tremor. There is 2+ bradykinesia R>L. Foot tapping worse on the left.. Walks with reduced SL and no FOG. Turns en bloc. \par

## 2020-12-08 NOTE — PROCEDURE
[FreeTextEntry1] : abbot\par \par Right DBS\par Contacts: 10A-11+ \par Parameters:2.3/70/130 \par Impedances:ok\par Battery:2.81\par \par Notes:\par \par Final Settings: unch - old\par new: 10A-11+: 2.2/60/130\par \par Left DBS\par Contacts: 2B-\par Parameters:3.4/50/180\par Impedances:ok\par Battery:\par \par Notes: inc amp and F red foot tremor and improved gait\par \par Final Settings: 2B-: 3.5/50/184  --old\par new: 2B- 3.2/50/180\par \par DBS programming time(mins):10\par

## 2020-12-28 NOTE — ED PROVIDER NOTE - PMH
----- Message from Kirsten Last NP sent at 12/27/2020  5:30 PM CST -----  Inform that TSH and free T4 are normal. CMP is stable. Total chol of 156 with trig 92. HDL of 41, LDL of 97. Hemogram is normal. Thanks.      Arthritis    Parkinson disease    Pneumonia

## 2021-01-08 ENCOUNTER — TRANSCRIPTION ENCOUNTER (OUTPATIENT)
Age: 41
End: 2021-01-08

## 2021-02-17 NOTE — ED PROVIDER NOTE - NS ED MD DISPO SPECIAL CONSIDERATION1
Problem: Pain  Goal: #Acceptable pain level achieved/maintained at rest using NRS/Faces  Description: This goal is used for patients who can self-report.  Acceptable means the level is at or below the identified comfort/function goal.  Outcome: Outcome Met, Continue evaluating goal progress toward completion  Note: Pt pain managed with medications. Refused nonpharm      Problem: Activity Intolerance  Goal: # Functional status is maintained or returned to baseline  Outcome: Outcome Not Met, Continue to Monitor  Note: Pt refusing activity      Problem: Delirium, Risk for  Goal: # No symptoms of delirium  Description: Evaluate delirium symptoms under active problem when present  Outcome: Outcome Met, Continue evaluating goal progress toward completion  Note: No delirium continue to reorient pt       None

## 2021-03-18 NOTE — ED PROVIDER NOTE - EKG #1 DATE/TIME
Please let the patient know that I sent the Norco over. However this is her last narcotic refill that we are able to give. We will have to switch to nonnarcotics after this 1. If she continues to have pain. 14-Feb-2018 12:04

## 2021-04-02 ENCOUNTER — APPOINTMENT (OUTPATIENT)
Dept: NEUROLOGY | Facility: CLINIC | Age: 41
End: 2021-04-02
Payer: MEDICARE

## 2021-04-02 VITALS
BODY MASS INDEX: 26.06 KG/M2 | HEIGHT: 67 IN | HEART RATE: 89 BPM | DIASTOLIC BLOOD PRESSURE: 78 MMHG | SYSTOLIC BLOOD PRESSURE: 121 MMHG | WEIGHT: 166 LBS

## 2021-04-02 PROCEDURE — 99214 OFFICE O/P EST MOD 30 MIN: CPT | Mod: 25

## 2021-04-02 PROCEDURE — 95970 ALYS NPGT W/O PRGRMG: CPT

## 2021-04-02 NOTE — PROCEDURE
[FreeTextEntry1] : abbot\par \par Right DBS\par Contacts: 10A-11+ \par Parameters:2.3/70/130 \par Impedances:ok\par Battery:<2.73\par \par Notes:\par \par Final Settings: unch \par \par \par Left DBS\par Contacts: 2B-\par Parameters:3.5/50/184\par Impedances:ok\par Battery:\par \par Notes: \par \par Final Settings: unch\par \par DBS programming time(mins):5\par

## 2021-04-02 NOTE — PHYSICAL EXAM
[FreeTextEntry1] : Last sinemet dose was yesterday\par \par There is mild masking. EOMI. Speech is 2+. There is intermittent, 2+ right leg rest tremor. There is 2+ bradykinesia R>L.. tone is normal. Walks with reduced SL. Nuah is good. No FOG\par

## 2021-04-02 NOTE — DISCUSSION/SUMMARY
[FreeTextEntry1] : PD with good tremor control but has increased bradykinesia--erratic sinemet use along with abnormal sleep cycle could be exacerbating it. Also, low IPG level is probably contributory \par \par \par Patient was counseled on the following recommendations:\par Will refer to Dr. Tello for IPG replacement\par Will adjust settings after battery has been replaced. He will stay on the same sinemet regimen and was encouraged to adhere to the q3hr dosing\par \par f/u 1-2months\par

## 2021-04-13 ENCOUNTER — APPOINTMENT (OUTPATIENT)
Dept: NEUROSURGERY | Facility: CLINIC | Age: 41
End: 2021-04-13
Payer: MEDICARE

## 2021-04-13 VITALS
WEIGHT: 170 LBS | HEIGHT: 67 IN | SYSTOLIC BLOOD PRESSURE: 133 MMHG | DIASTOLIC BLOOD PRESSURE: 85 MMHG | HEART RATE: 72 BPM | BODY MASS INDEX: 26.68 KG/M2

## 2021-04-13 VITALS — TEMPERATURE: 97.1 F

## 2021-04-13 PROCEDURE — 99203 OFFICE O/P NEW LOW 30 MIN: CPT

## 2021-04-16 NOTE — REVIEW OF SYSTEMS
[As Noted in HPI] : as noted in HPI [Poor Coordination] : poor coordination [Negative] : Respiratory [Abdominal Pain] : no abdominal pain [Vomiting] : no vomiting [Diarrhea] : no diarrhea [Incontinence] : no incontinence [FreeTextEntry9] : R

## 2021-04-16 NOTE — REASON FOR VISIT
[Consultation] : a consultation visit [Referred By: _________] : Patient was referred by RAGINI [Spouse] : spouse [FreeTextEntry1] : PD/ DBS IPG end of life consult

## 2021-04-16 NOTE — HISTORY OF PRESENT ILLNESS
[> 3 months] : more  than 3 months [FreeTextEntry1] : DBS IPG has reached end of life [de-identified] : 39 yo male with PMH of asthma, arthritis, and PD s/p DBS bilateral STN in 2017 (Dr. Rodriguez) who arrives for an initial surgical consult for IGP replacement due to end of battery life.  He has been receiving the benefit of symptom control with the existing DBS programming and wishes to continue.  \par \par \par 4/2/21 Abbott IPG at 2.73 V (has 1 IPG with 2 arrays)\par \par \par \par \par \par

## 2021-04-16 NOTE — PHYSICAL EXAM
[General Appearance - Alert] : alert [General Appearance - In No Acute Distress] : in no acute distress [Oriented To Time, Place, And Person] : oriented to person, place, and time [Impaired Insight] : insight and judgment were intact [Limited Balance] : the patient's balance was impaired [Tremor] : a tremor present [Sclera] : the sclera and conjunctiva were normal [PERRL With Normal Accommodation] : pupils were equal in size, round, reactive to light, with normal accommodation [Hearing Threshold Finger Rub Not Ida] : hearing was normal [Neck Appearance] : the appearance of the neck was normal [] : no respiratory distress [Respiration, Rhythm And Depth] : normal respiratory rhythm and effort [Edema] : there was no peripheral edema [Skin Color & Pigmentation] : normal skin color and pigmentation [FreeTextEntry1] : motor fluctuations, bradykinesias, LID, tremors RLE

## 2021-04-16 NOTE — ASSESSMENT
[FreeTextEntry1] : 41 yo male with existing DBS for PD who has been well controlled with programming.  The IPG has reached end of life, and he is becoming more symptomatic.  He is indicated for replacement.  After discussion of the risks, benefits, and alternatives to IPG replacement, he wishes to proceed.\par \par 1)  Schedule DBS IPG replacement (has 1 IPG - 2 arrays Abbott)

## 2021-04-26 ENCOUNTER — OUTPATIENT (OUTPATIENT)
Dept: OUTPATIENT SERVICES | Facility: HOSPITAL | Age: 41
LOS: 1 days | End: 2021-04-26
Payer: MEDICARE

## 2021-04-26 VITALS
TEMPERATURE: 97 F | HEIGHT: 67 IN | RESPIRATION RATE: 18 BRPM | SYSTOLIC BLOOD PRESSURE: 129 MMHG | HEART RATE: 78 BPM | DIASTOLIC BLOOD PRESSURE: 89 MMHG | OXYGEN SATURATION: 99 % | WEIGHT: 162.04 LBS

## 2021-04-26 DIAGNOSIS — Z98.890 OTHER SPECIFIED POSTPROCEDURAL STATES: Chronic | ICD-10-CM

## 2021-04-26 DIAGNOSIS — Z96.89 PRESENCE OF OTHER SPECIFIED FUNCTIONAL IMPLANTS: Chronic | ICD-10-CM

## 2021-04-26 DIAGNOSIS — G20 PARKINSON'S DISEASE: ICD-10-CM

## 2021-04-26 DIAGNOSIS — Z01.818 ENCOUNTER FOR OTHER PREPROCEDURAL EXAMINATION: ICD-10-CM

## 2021-04-26 LAB
ANION GAP SERPL CALC-SCNC: 13 MMOL/L — SIGNIFICANT CHANGE UP (ref 5–17)
BUN SERPL-MCNC: 20 MG/DL — SIGNIFICANT CHANGE UP (ref 7–23)
CALCIUM SERPL-MCNC: 9.9 MG/DL — SIGNIFICANT CHANGE UP (ref 8.4–10.5)
CHLORIDE SERPL-SCNC: 103 MMOL/L — SIGNIFICANT CHANGE UP (ref 96–108)
CO2 SERPL-SCNC: 23 MMOL/L — SIGNIFICANT CHANGE UP (ref 22–31)
CREAT SERPL-MCNC: 0.78 MG/DL — SIGNIFICANT CHANGE UP (ref 0.5–1.3)
GLUCOSE SERPL-MCNC: 91 MG/DL — SIGNIFICANT CHANGE UP (ref 70–99)
HCT VFR BLD CALC: 51.5 % — HIGH (ref 39–50)
HGB BLD-MCNC: 15.9 G/DL — SIGNIFICANT CHANGE UP (ref 13–17)
MCHC RBC-ENTMCNC: 26.5 PG — LOW (ref 27–34)
MCHC RBC-ENTMCNC: 30.9 GM/DL — LOW (ref 32–36)
MCV RBC AUTO: 85.7 FL — SIGNIFICANT CHANGE UP (ref 80–100)
NRBC # BLD: 0 /100 WBCS — SIGNIFICANT CHANGE UP (ref 0–0)
PLATELET # BLD AUTO: 330 K/UL — SIGNIFICANT CHANGE UP (ref 150–400)
POTASSIUM SERPL-MCNC: 4.5 MMOL/L — SIGNIFICANT CHANGE UP (ref 3.5–5.3)
POTASSIUM SERPL-SCNC: 4.5 MMOL/L — SIGNIFICANT CHANGE UP (ref 3.5–5.3)
RBC # BLD: 6.01 M/UL — HIGH (ref 4.2–5.8)
RBC # FLD: 12.8 % — SIGNIFICANT CHANGE UP (ref 10.3–14.5)
SODIUM SERPL-SCNC: 139 MMOL/L — SIGNIFICANT CHANGE UP (ref 135–145)
WBC # BLD: 7.16 K/UL — SIGNIFICANT CHANGE UP (ref 3.8–10.5)
WBC # FLD AUTO: 7.16 K/UL — SIGNIFICANT CHANGE UP (ref 3.8–10.5)

## 2021-04-26 PROCEDURE — 87640 STAPH A DNA AMP PROBE: CPT

## 2021-04-26 PROCEDURE — G0463: CPT

## 2021-04-26 PROCEDURE — 87641 MR-STAPH DNA AMP PROBE: CPT

## 2021-04-26 PROCEDURE — 85027 COMPLETE CBC AUTOMATED: CPT

## 2021-04-26 PROCEDURE — 80048 BASIC METABOLIC PNL TOTAL CA: CPT

## 2021-04-26 NOTE — H&P PST ADULT - NSICDXFAMILYHX_GEN_ALL_CORE_FT
FAMILY HISTORY:  Aunt  Still living? Unknown  Family history of primary tuberculosis, Age at diagnosis: Age Unknown

## 2021-04-26 NOTE — H&P PST ADULT - ACTIVITY
Pt needs assisstance with most ADLs due to PD, able to walk short distances (unsteady gait) denies any cardiac distress symptoms during activity

## 2021-04-26 NOTE — H&P PST ADULT - HISTORY OF PRESENT ILLNESS
This is a 40 year old male with past medical history of Parkinson's disease (diagnosed at age 28 yo) s/p DBS implanted (2017)        Pt is now presenting to PST for deep brain stimulation implantation pulse generator replacement non 5/10/21 with Dr. Tello   **Covid test on  This is a 40 year old male with past medical history of Parkinson's disease (diagnosed at age 30 yo) s/p DBS implanted (2017),  and Crainiotomy 2018, Asthma  triggered with cold weather ( last used inhaler 4 months ago) Denies any hospitalization or  oxygenation        Pt is now presenting to PST for deep brain stimulation implantation pulse generator replacement non 5/10/21 with Dr. Tello     **Covid test on  5/7/21 @ [facility]. Denies any history of Covid. Denies any recent illness, sick contact, or travelling This is a 40 year old male with past medical history of Parkinson's disease (diagnosed at age 30 yo) s/p STJ  DBS implanted (2017),  and Craniotomy (2018) and, Asthma  triggered with cold weather ( last used inhaler 4 months ago) Denies any hospitalization or  oxygenation.  Pulse generator  now end of battery life. Pt is now presenting to PST for deep brain stimulation implantation pulse generator replacement non 5/10/21 with Dr. Tello     **Covid test on  5/7/21 @ [facility]. Denies any history of Covid. Denies any recent illness, sick contact, or travelling

## 2021-04-26 NOTE — H&P PST ADULT - NSICDXPROBLEM_GEN_ALL_CORE_FT
PROBLEM DIAGNOSES  Problem: Parkinson disease  Assessment and Plan: Scheduled for deep brain stimulation pulse generartor replacement on 5/10  Preop instructions and chlorohexidine soap given  Labs CBC BMP MRSA performed in PST  Covid test on 5/7 @ [facility]

## 2021-04-26 NOTE — H&P PST ADULT - NSICDXPASTMEDICALHX_GEN_ALL_CORE_FT
PAST MEDICAL HISTORY:  Arthritis     Asthma     Osteomyelitis Right middle- Resolved 2018    Parkinson disease     Pneumonia

## 2021-04-26 NOTE — H&P PST ADULT - NSICDXPASTSURGICALHX_GEN_ALL_CORE_FT
PAST SURGICAL HISTORY:  Brain surgery within last 3 months Crainotomy 2018    H/O inguinal hernia repair     Status post deep brain stimulator placement 2017- [facility]

## 2021-04-27 LAB
MRSA PCR RESULT.: SIGNIFICANT CHANGE UP
S AUREUS DNA NOSE QL NAA+PROBE: SIGNIFICANT CHANGE UP

## 2021-05-04 PROBLEM — M86.9 OSTEOMYELITIS, UNSPECIFIED: Chronic | Status: ACTIVE | Noted: 2021-04-26

## 2021-05-04 PROBLEM — J45.909 UNSPECIFIED ASTHMA, UNCOMPLICATED: Chronic | Status: ACTIVE | Noted: 2021-04-26

## 2021-05-07 ENCOUNTER — OUTPATIENT (OUTPATIENT)
Dept: OUTPATIENT SERVICES | Facility: HOSPITAL | Age: 41
LOS: 1 days | End: 2021-05-07
Payer: MEDICARE

## 2021-05-07 DIAGNOSIS — Z11.52 ENCOUNTER FOR SCREENING FOR COVID-19: ICD-10-CM

## 2021-05-07 DIAGNOSIS — Z96.89 PRESENCE OF OTHER SPECIFIED FUNCTIONAL IMPLANTS: Chronic | ICD-10-CM

## 2021-05-07 DIAGNOSIS — Z98.890 OTHER SPECIFIED POSTPROCEDURAL STATES: Chronic | ICD-10-CM

## 2021-05-07 LAB — SARS-COV-2 RNA SPEC QL NAA+PROBE: SIGNIFICANT CHANGE UP

## 2021-05-07 PROCEDURE — U0003: CPT

## 2021-05-07 PROCEDURE — U0005: CPT

## 2021-05-07 PROCEDURE — C9803: CPT

## 2021-05-21 ENCOUNTER — OUTPATIENT (OUTPATIENT)
Dept: OUTPATIENT SERVICES | Facility: HOSPITAL | Age: 41
LOS: 1 days | End: 2021-05-21
Payer: MEDICARE

## 2021-05-21 DIAGNOSIS — Z11.52 ENCOUNTER FOR SCREENING FOR COVID-19: ICD-10-CM

## 2021-05-21 DIAGNOSIS — Z96.89 PRESENCE OF OTHER SPECIFIED FUNCTIONAL IMPLANTS: Chronic | ICD-10-CM

## 2021-05-21 DIAGNOSIS — Z98.890 OTHER SPECIFIED POSTPROCEDURAL STATES: Chronic | ICD-10-CM

## 2021-05-21 LAB — SARS-COV-2 RNA SPEC QL NAA+PROBE: SIGNIFICANT CHANGE UP

## 2021-05-21 PROCEDURE — U0003: CPT

## 2021-05-21 PROCEDURE — C9803: CPT

## 2021-05-21 PROCEDURE — U0005: CPT

## 2021-05-23 ENCOUNTER — TRANSCRIPTION ENCOUNTER (OUTPATIENT)
Age: 41
End: 2021-05-23

## 2021-05-24 ENCOUNTER — APPOINTMENT (OUTPATIENT)
Dept: NEUROSURGERY | Facility: HOSPITAL | Age: 41
End: 2021-05-24

## 2021-05-24 ENCOUNTER — OUTPATIENT (OUTPATIENT)
Dept: OUTPATIENT SERVICES | Facility: HOSPITAL | Age: 41
LOS: 1 days | End: 2021-05-24
Payer: MEDICARE

## 2021-05-24 VITALS
HEIGHT: 67 IN | OXYGEN SATURATION: 98 % | SYSTOLIC BLOOD PRESSURE: 99 MMHG | RESPIRATION RATE: 16 BRPM | WEIGHT: 162.04 LBS | HEART RATE: 67 BPM | DIASTOLIC BLOOD PRESSURE: 67 MMHG | TEMPERATURE: 99 F

## 2021-05-24 VITALS
RESPIRATION RATE: 16 BRPM | HEART RATE: 62 BPM | TEMPERATURE: 98 F | OXYGEN SATURATION: 98 % | DIASTOLIC BLOOD PRESSURE: 52 MMHG | SYSTOLIC BLOOD PRESSURE: 121 MMHG

## 2021-05-24 DIAGNOSIS — Z96.89 PRESENCE OF OTHER SPECIFIED FUNCTIONAL IMPLANTS: Chronic | ICD-10-CM

## 2021-05-24 DIAGNOSIS — Z98.890 OTHER SPECIFIED POSTPROCEDURAL STATES: Chronic | ICD-10-CM

## 2021-05-24 DIAGNOSIS — G20 PARKINSON'S DISEASE: ICD-10-CM

## 2021-05-24 PROCEDURE — C1787: CPT

## 2021-05-24 PROCEDURE — 61886 IMPLANT NEUROSTIM ARRAYS: CPT

## 2021-05-24 PROCEDURE — 61886 IMPLANT NEUROSTIM ARRAYS: CPT | Mod: AS

## 2021-05-24 PROCEDURE — C1767: CPT

## 2021-05-24 RX ORDER — OXYCODONE HYDROCHLORIDE 5 MG/1
5 TABLET ORAL ONCE
Refills: 0 | Status: DISCONTINUED | OUTPATIENT
Start: 2021-05-24 | End: 2021-05-24

## 2021-05-24 RX ORDER — SODIUM CHLORIDE 9 MG/ML
1000 INJECTION, SOLUTION INTRAVENOUS
Refills: 0 | Status: DISCONTINUED | OUTPATIENT
Start: 2021-05-24 | End: 2021-06-07

## 2021-05-24 RX ORDER — ACETAMINOPHEN 500 MG
2 TABLET ORAL
Qty: 56 | Refills: 0
Start: 2021-05-24 | End: 2021-05-30

## 2021-05-24 RX ORDER — AZTREONAM 2 G
1 VIAL (EA) INJECTION
Qty: 10 | Refills: 0
Start: 2021-05-24 | End: 2021-05-28

## 2021-05-24 RX ORDER — ONDANSETRON 8 MG/1
4 TABLET, FILM COATED ORAL ONCE
Refills: 0 | Status: DISCONTINUED | OUTPATIENT
Start: 2021-05-24 | End: 2021-06-07

## 2021-05-24 NOTE — ASU DISCHARGE PLAN (ADULT/PEDIATRIC) - ASU DC SPECIAL INSTRUCTIONSFT
Followup with your Private MD in 1-2 weeks.  Return to Emergency Department or contact your Neurosurgeon if any changes in mental status, weakness, numbness or tingling of extremities; difficulty swallowing; drainage or redness of wound, fever; pain in legs; difficulty urinating or constipation.  Donot restart your Aspirin or take any Motrin/NSAIDS until checking with your Neurosurgeon.  No strenous activity. No heavy lifting. Do not return to work until cleared by physician. No driving until cleared by physician.  Remove dressing on 3rd day after your surgery and leave incision open to air. You may shower on the 3rd day after you surgery.  No soaking in tub,  Donot remove steri strips if present.  Donot apply any ointements to incision. Followup with your Private MD in 1-2 weeks.  Return to Emergency Department or contact your Neurosurgeon if any changes in mental status, weakness, numbness or tingling of extremities; difficulty swallowing; drainage or redness of wound, fever; pain in legs; difficulty urinating or constipation.  Do not restart your Aspirin or take any Motrin/NSAIDS until checking with your Neurosurgeon.  No strenous activity. No heavy lifting. Do not return to work until cleared by physician. No driving until cleared by physician.  Remove dressing on 3rd day after your surgery and leave incision open to air. You may shower on the 3rd day after you surgery.  No soaking in tub,  Do not remove steri strips if present.  Donot apply any ointements to incision.

## 2021-05-24 NOTE — ASU DISCHARGE PLAN (ADULT/PEDIATRIC) - CARE PROVIDER_API CALL
Oleg Tello)  Neurosurgery  General  [address] [facility], [address]  [place] [address]  Phone: [phone]  Fax: [phone]  Follow Up Time:

## 2021-05-24 NOTE — ASU PATIENT PROFILE, ADULT - PSH
Brain surgery within last 3 months  Crainotomy 2018  H/O inguinal hernia repair    Status post deep brain stimulator placement  2017- [facility]

## 2021-05-24 NOTE — ASU PATIENT PROFILE, ADULT - PMH
Arthritis    Asthma    Osteomyelitis  Right middle- Resolved 2018  Parkinson disease    Pneumonia

## 2021-05-26 NOTE — ED ADULT NURSE NOTE - NS_NURSE_DISC_TEACHING_YN_ED_ALL_ED
FOLLOW  UP  PATIENT  VISIT       Reason for visit:  Xiaflex manipulation    INTERIM HISTORY:       June 27, 2013   Injected 2 days ago with Xiaflex. Some swelling, and ecchymosis. A little discomfort.     EXAMINATION:   MIld swelling, and local ecchymosis   NV exam WNL   Tender to touch   Active flexion is intact    PROCEDURE:   After a sterile skin prep the digit was anesthetized with local anesthesia. I then gradually and progressively extended the digit until the cord popped and the digit extended.    There was not a small skin tear.   Correction was complete both joints     Patient was sent to OT for the resting night splint.    OT follow up for splint adjustment in 1 week    Reevaluate in 4 weeks, activities as tolerated.      
Latex:  Patient denies allergy to latex.  Medications reviewed with patient.  Tobacco use verified.  Allergies verified.    REFERRING MD:  Robby Guerra MD  Primary Medical Doctor: Robby Guerra MD   DATE OF INJURY/SURGERY:  Left ring finger xiaflex injection 5/25/21  WORK RELATED: no  OCCUPATION: retired    Patient is here for Xiaflex manipulation left ring finger. Some bruising and a bit of swelling. Taking nothing for pain.   
Yes

## 2021-06-02 ENCOUNTER — APPOINTMENT (OUTPATIENT)
Dept: NEUROSURGERY | Facility: CLINIC | Age: 41
End: 2021-06-02
Payer: MEDICARE

## 2021-06-02 VITALS
DIASTOLIC BLOOD PRESSURE: 75 MMHG | BODY MASS INDEX: 25.11 KG/M2 | HEIGHT: 67 IN | HEART RATE: 90 BPM | SYSTOLIC BLOOD PRESSURE: 116 MMHG | WEIGHT: 160 LBS

## 2021-06-02 PROCEDURE — 99024 POSTOP FOLLOW-UP VISIT: CPT

## 2021-06-02 NOTE — HISTORY OF PRESENT ILLNESS
[FreeTextEntry1] : 39 yo male arrives for an initial post op visit s/p 5/24/21 Right DBS IPG replacement same [employer] marcos.  The surgical incision is well healed, Prineo/dermabond intact, no signs of irritation, erythema, edema, warmth or drainage.  Denies headaches, seizure, nausea, vomiting, or fever.  Denies pain, numbness, tingling, or weakness.\par Ambulating at baseline with his wife present, no new issues.

## 2021-06-02 NOTE — ASSESSMENT
[FreeTextEntry1] : Reviewed standard post op wound care instructions and light activity restrictions x 8 weeks.  He is s/p Right DBS IPG replacement with same [employer] Infinity IPG.\par Contacted [employer] to reach out, and they will schedule f/u with Dr. Lovett for routine f/u and any necessary adjustments.\par \par 1)  f/u PRN\par \par

## 2021-06-21 ENCOUNTER — NON-APPOINTMENT (OUTPATIENT)
Age: 41
End: 2021-06-21

## 2021-06-21 ENCOUNTER — APPOINTMENT (OUTPATIENT)
Dept: NEUROLOGY | Facility: CLINIC | Age: 41
End: 2021-06-21
Payer: MEDICARE

## 2021-06-21 PROCEDURE — 99214 OFFICE O/P EST MOD 30 MIN: CPT | Mod: 95

## 2021-06-21 NOTE — PHYSICAL EXAM
[FreeTextEntry1] : There is mild masking. EOMI. Speech is 2+,. There is intermittent, 2+ right leg rest tremor. There is 2+ bradykinesia R>L. Shuffles mildly\par

## 2021-06-21 NOTE — PROCEDURE
[FreeTextEntry1] : Neurosphere\par \par Right DBS\par Contacts: 10A-11+ \par Parameters:2.3/70/130 \par Impedances:ok\par Battery:<2.95\par \par Notes:\par \par Final Settings: unch - old\par new: 10A-11+: 2.2/60/130\par \par Left DBS\par Contacts: 2B-\par Parameters:3.5/50/184\par Impedances:ok\par Battery:\par \par Notes: inc. amp improved gait. Inc in freq to 186 worsened speech\par \par Final Settings: 2B-: 3.6/50/184 --(6-21)\par new: 2B- 3.2/50/180\par \par DBS programming time(mins):10\par

## 2021-06-21 NOTE — DISCUSSION/SUMMARY
[FreeTextEntry1] : PD with good tremor control with some stim related speech issues\par Gait disorder \par \par \par Patient was counseled on the following recommendations:\par \par DBS adjusted today. Increases in amplitude for right hemibody improves his gait and tremor\par "New" works best for on LID\par \par f/u 2weeks\par \par f/u 3months\par

## 2021-06-21 NOTE — HISTORY OF PRESENT ILLNESS
[Home] : at home, [unfilled] , at the time of the visit. [Medical Office: ([facility])___] : at the medical office located in  [Spouse] : spouse [Verbal consent obtained from patient] : the patient, [unfilled] [FreeTextEntry1] : Speech is stable\par Balance feels off since IPG replacement by Dr. Tello\par Switches to 'new' program after taking 1 tab c/l which dampens LID for 1 hr\par \par \par Meds\par sinemet 1 tab q3hrs ( starts at 3P and ends at 3AM)

## 2021-08-17 NOTE — PATIENT PROFILE ADULT. - AS SC BRADEN ACTIVITY
9/1/2021    Patient: Sissy Anderson   YOB: 1940   Date of Visit: 8/17/2021     Elise Nassar MD  [address] [address]  Via Fax: [phone]    Dear Elise Nassar MD,      Thank you [facility] Anderson [facility]. My notes for this consultation are attached. If you have questions, please do not hesitate to call me. I look forward to following your patient along with you.       Sincerely,    Tiana Howell MD (3) walks occasionally

## 2021-10-01 ENCOUNTER — APPOINTMENT (OUTPATIENT)
Dept: NEUROLOGY | Facility: CLINIC | Age: 41
End: 2021-10-01

## 2021-10-02 ENCOUNTER — RX RENEWAL (OUTPATIENT)
Age: 41
End: 2021-10-02

## 2021-10-21 ENCOUNTER — APPOINTMENT (OUTPATIENT)
Dept: NEUROLOGY | Facility: CLINIC | Age: 41
End: 2021-10-21
Payer: MEDICARE

## 2021-10-21 VITALS
DIASTOLIC BLOOD PRESSURE: 86 MMHG | BODY MASS INDEX: 24.8 KG/M2 | HEART RATE: 82 BPM | WEIGHT: 158 LBS | HEIGHT: 67 IN | SYSTOLIC BLOOD PRESSURE: 135 MMHG

## 2021-10-21 PROCEDURE — 95970 ALYS NPGT W/O PRGRMG: CPT

## 2021-10-21 PROCEDURE — 99214 OFFICE O/P EST MOD 30 MIN: CPT | Mod: 25

## 2021-10-21 NOTE — PHYSICAL EXAM
[FreeTextEntry1] : There is mild masking. EOMI. Speech is intermittently dysarthric, but comprehensible. There is intermittent, 2+ right leg rest tremor. There is 2+ bradykinesia R>L. Foot tapping worse on the left. tone is normal. Walks with better SL and no FOG. \par

## 2021-10-21 NOTE — PROCEDURE
[FreeTextEntry1] : [employer]bot\par \par Right DBS\par Contacts: 10A-11+ \par Parameters:2.3/70/130 \par Impedances:ok\par Battery:2.94\par \par Notes:\par \par Final Settings: unch \par \par \par Left DBS\par Contacts: 2B-\par Parameters:3.6/50/184 (6-21)\par Impedances:ok\par Battery:\par \par Notes: 2b-3+ : 3/50/180 : no RLE tremor. sp worse\par 2b-2A-: 3/60/180 - sp worse\par \par Final Settings: unch\par 2B: 3.2/50/180 (new) good for sp\par \par Programming time: 15mins

## 2021-10-21 NOTE — HISTORY OF PRESENT ILLNESS
[FreeTextEntry1] : He reports increased dyskinesia after taking 1 tab of LD. He switches to 'New' prog on  DBS to alleviate it them--can take 45minutes for them to julio. Speech improves with 'New' prg. \par c/o right leg tremor is breaking through\par Has more gait difficulty which he feels is related to LID\par Feels that sinemet helps with mental clarify primarily at this time. \par \par Meds\par sinemet 1 tab q3hrs ( starts at 3P and ends at 3AM)

## 2021-10-21 NOTE — DISCUSSION/SUMMARY
[FreeTextEntry1] : PD s/p b/l STN DBS with LID, stim related speech issues and gait disorder. 6-21 program works well for tremor control and gait while 'new' prog improves his speech and alleviates LID\par \par Patient was counseled on the following recommendations:\par DBS unchanged today\par lower sinemet to 1-1/2-1-1/2 -1. If LID continues to be an issue, will reprogram 'New' prog to see if better tremor control can be achieved with less speech compromise\par \par f/u 3months\par

## 2021-11-07 ENCOUNTER — RX RENEWAL (OUTPATIENT)
Age: 41
End: 2021-11-07

## 2021-12-06 ENCOUNTER — EMERGENCY (EMERGENCY)
Facility: HOSPITAL | Age: 41
LOS: 0 days | Discharge: HOME | End: 2021-12-06
Attending: EMERGENCY MEDICINE
Payer: MEDICARE

## 2021-12-06 VITALS
OXYGEN SATURATION: 98 % | HEIGHT: 67 IN | HEART RATE: 67 BPM | SYSTOLIC BLOOD PRESSURE: 124 MMHG | TEMPERATURE: 98 F | WEIGHT: 164.91 LBS | DIASTOLIC BLOOD PRESSURE: 82 MMHG | RESPIRATION RATE: 19 BRPM

## 2021-12-06 DIAGNOSIS — J18.9 PNEUMONIA, UNSPECIFIED ORGANISM: ICD-10-CM

## 2021-12-06 DIAGNOSIS — R07.89 OTHER CHEST PAIN: ICD-10-CM

## 2021-12-06 DIAGNOSIS — Z96.89 PRESENCE OF OTHER SPECIFIED FUNCTIONAL IMPLANTS: Chronic | ICD-10-CM

## 2021-12-06 DIAGNOSIS — J45.909 UNSPECIFIED ASTHMA, UNCOMPLICATED: ICD-10-CM

## 2021-12-06 DIAGNOSIS — Z98.890 OTHER SPECIFIED POSTPROCEDURAL STATES: Chronic | ICD-10-CM

## 2021-12-06 DIAGNOSIS — G20 PARKINSON'S DISEASE: ICD-10-CM

## 2021-12-06 DIAGNOSIS — Z88.1 ALLERGY STATUS TO OTHER ANTIBIOTIC AGENTS STATUS: ICD-10-CM

## 2021-12-06 LAB
ALBUMIN SERPL ELPH-MCNC: 3.5 G/DL — SIGNIFICANT CHANGE UP (ref 3.3–5)
ALP SERPL-CCNC: 63 U/L — SIGNIFICANT CHANGE UP (ref 40–120)
ALT FLD-CCNC: 36 U/L — SIGNIFICANT CHANGE UP (ref 12–78)
ANION GAP SERPL CALC-SCNC: 3 MMOL/L — LOW (ref 5–17)
APTT BLD: 43.3 SEC — HIGH (ref 27.5–35.5)
AST SERPL-CCNC: 14 U/L — LOW (ref 15–37)
BASOPHILS # BLD AUTO: 0.05 K/UL — SIGNIFICANT CHANGE UP (ref 0–0.2)
BASOPHILS NFR BLD AUTO: 0.9 % — SIGNIFICANT CHANGE UP (ref 0–2)
BILIRUB SERPL-MCNC: 0.9 MG/DL — SIGNIFICANT CHANGE UP (ref 0.2–1.2)
BUN SERPL-MCNC: 16 MG/DL — SIGNIFICANT CHANGE UP (ref 7–23)
CALCIUM SERPL-MCNC: 8.8 MG/DL — SIGNIFICANT CHANGE UP (ref 8.5–10.1)
CHLORIDE SERPL-SCNC: 108 MMOL/L — SIGNIFICANT CHANGE UP (ref 96–108)
CO2 SERPL-SCNC: 30 MMOL/L — SIGNIFICANT CHANGE UP (ref 22–31)
CREAT SERPL-MCNC: 0.7 MG/DL — SIGNIFICANT CHANGE UP (ref 0.5–1.3)
EOSINOPHIL # BLD AUTO: 0.19 K/UL — SIGNIFICANT CHANGE UP (ref 0–0.5)
EOSINOPHIL NFR BLD AUTO: 3.4 % — SIGNIFICANT CHANGE UP (ref 0–6)
GLUCOSE SERPL-MCNC: 106 MG/DL — HIGH (ref 70–99)
HCT VFR BLD CALC: 49.2 % — SIGNIFICANT CHANGE UP (ref 39–50)
HGB BLD-MCNC: 15.5 G/DL — SIGNIFICANT CHANGE UP (ref 13–17)
IMM GRANULOCYTES NFR BLD AUTO: 0.4 % — SIGNIFICANT CHANGE UP (ref 0–1.5)
INR BLD: 1.05 RATIO — SIGNIFICANT CHANGE UP (ref 0.88–1.16)
LIDOCAIN IGE QN: 98 U/L — SIGNIFICANT CHANGE UP (ref 73–393)
LYMPHOCYTES # BLD AUTO: 1.9 K/UL — SIGNIFICANT CHANGE UP (ref 1–3.3)
LYMPHOCYTES # BLD AUTO: 34.4 % — SIGNIFICANT CHANGE UP (ref 13–44)
MCHC RBC-ENTMCNC: 26.4 PG — LOW (ref 27–34)
MCHC RBC-ENTMCNC: 31.5 GM/DL — LOW (ref 32–36)
MCV RBC AUTO: 83.8 FL — SIGNIFICANT CHANGE UP (ref 80–100)
MONOCYTES # BLD AUTO: 0.37 K/UL — SIGNIFICANT CHANGE UP (ref 0–0.9)
MONOCYTES NFR BLD AUTO: 6.7 % — SIGNIFICANT CHANGE UP (ref 2–14)
NEUTROPHILS # BLD AUTO: 2.99 K/UL — SIGNIFICANT CHANGE UP (ref 1.8–7.4)
NEUTROPHILS NFR BLD AUTO: 54.2 % — SIGNIFICANT CHANGE UP (ref 43–77)
NRBC # BLD: 0 /100 WBCS — SIGNIFICANT CHANGE UP (ref 0–0)
NT-PROBNP SERPL-SCNC: 10 PG/ML — SIGNIFICANT CHANGE UP (ref 0–125)
PLATELET # BLD AUTO: 279 K/UL — SIGNIFICANT CHANGE UP (ref 150–400)
POTASSIUM SERPL-MCNC: 4.1 MMOL/L — SIGNIFICANT CHANGE UP (ref 3.5–5.3)
POTASSIUM SERPL-SCNC: 4.1 MMOL/L — SIGNIFICANT CHANGE UP (ref 3.5–5.3)
PROT SERPL-MCNC: 6.8 GM/DL — SIGNIFICANT CHANGE UP (ref 6–8.3)
PROTHROM AB SERPL-ACNC: 12.2 SEC — SIGNIFICANT CHANGE UP (ref 10.6–13.6)
RBC # BLD: 5.87 M/UL — HIGH (ref 4.2–5.8)
RBC # FLD: 12.8 % — SIGNIFICANT CHANGE UP (ref 10.3–14.5)
SODIUM SERPL-SCNC: 141 MMOL/L — SIGNIFICANT CHANGE UP (ref 135–145)
TROPONIN I, HIGH SENSITIVITY RESULT: 6.3 NG/L — SIGNIFICANT CHANGE UP
WBC # BLD: 5.52 K/UL — SIGNIFICANT CHANGE UP (ref 3.8–10.5)
WBC # FLD AUTO: 5.52 K/UL — SIGNIFICANT CHANGE UP (ref 3.8–10.5)

## 2021-12-06 PROCEDURE — 99284 EMERGENCY DEPT VISIT MOD MDM: CPT

## 2021-12-06 PROCEDURE — 71045 X-RAY EXAM CHEST 1 VIEW: CPT | Mod: 26

## 2021-12-06 RX ORDER — CYCLOBENZAPRINE HYDROCHLORIDE 10 MG/1
1 TABLET, FILM COATED ORAL
Qty: 15 | Refills: 0
Start: 2021-12-06 | End: 2021-12-10

## 2021-12-06 RX ORDER — IBUPROFEN 200 MG
1 TABLET ORAL
Qty: 20 | Refills: 0
Start: 2021-12-06 | End: 2021-12-10

## 2021-12-06 NOTE — ED PROVIDER NOTE - OBJECTIVE STATEMENT
42 y/o male with PMH of parkinson's s/p deep brain stimulator presents to ED because of intermittent CP for the past week on the left side of the chest. Pt reports a burning sensation that lasts for couple seconds and then resolves. Pt denies SOB, abdominal pain cough, and fever. Pt reports no prior cardiology issue in the past.

## 2021-12-06 NOTE — ED PROVIDER NOTE - CLINICAL SUMMARY MEDICAL DECISION MAKING FREE TEXT BOX
Ddx: Rule out ACS, heart score of 0, questionable malfunction stimulator, no pleuritic pain to suggest PE.   Plan: CBC, CMP, CXR, EKG, troponin, and call neurologist. Patient is comfortable and denies pain medications.

## 2021-12-06 NOTE — ED ADULT NURSE NOTE - OBJECTIVE STATEMENT
Patient states that he's having left sided chest pain radiating to shoulder x3 weeks. States pain is coming more frequent. Denies SOB, nausea, vomiting. Patient states he has Parkinson and  an DBS implant.

## 2021-12-06 NOTE — ED PROVIDER NOTE - CARE PROVIDER_API CALL
Gary Calvo)  Cardiology; Interventional Cardiology  [address], [place] [id number]  Phone: [phone]  Fax: [phone]  Follow Up Time: 1-3 Days

## 2021-12-06 NOTE — ED PROVIDER NOTE - PROGRESS NOTE DETAILS
Spoke with patient's neurologist, highly unlikely DBS to be causing symptoms. Pt is feeling well, asymptomatic in ED, eager for d/c. Has f/u w Dr. West for cardiology, return precautions provided, and pt is ready for d/c.

## 2021-12-06 NOTE — ED PROVIDER NOTE - PATIENT PORTAL LINK FT
You can access the FollowMyHealth Patient Portal offered by [facility] by registering at the following website: [email or web address]. By joining [facility]’s FollowMyHealth portal, you will also be able to view your health information using other applications (apps) compatible with our system.

## 2021-12-14 ENCOUNTER — APPOINTMENT (OUTPATIENT)
Dept: CARDIOLOGY | Facility: CLINIC | Age: 41
End: 2021-12-14
Payer: MEDICARE

## 2021-12-14 ENCOUNTER — NON-APPOINTMENT (OUTPATIENT)
Age: 41
End: 2021-12-14

## 2021-12-14 VITALS
SYSTOLIC BLOOD PRESSURE: 118 MMHG | HEIGHT: 67 IN | BODY MASS INDEX: 25.58 KG/M2 | DIASTOLIC BLOOD PRESSURE: 82 MMHG | OXYGEN SATURATION: 98 % | WEIGHT: 163 LBS | HEART RATE: 89 BPM

## 2021-12-14 PROCEDURE — 93000 ELECTROCARDIOGRAM COMPLETE: CPT

## 2021-12-14 PROCEDURE — 99204 OFFICE O/P NEW MOD 45 MIN: CPT

## 2021-12-15 NOTE — HISTORY OF PRESENT ILLNESS
[FreeTextEntry1] : Theresa 42yo man with a PMH of early onset Parkinsons on Sinemet and s/p deep brain stimulator.\par He reports that after taking levodopa, he experiences significant dyskinesia with tachycardia 100-130s; can last 20-45min before resolving.  \par Has also started experiencing some chest pains; very strong hx of CAD with multiple episodes of SCD in young family members.

## 2021-12-15 NOTE — DISCUSSION/SUMMARY
[FreeTextEntry1] : Theresa 42yo man with a PMH of early onset Parkinsons on Sinemet and s/p deep brain stimulator.\par He reports that after taking levodopa, he experiences significant dyskinesia with tachycardia 100-130s; can last 20-45min before resolving.  \par Has also started experiencing some chest pains; very strong hx of CAD with multiple episodes of SCD in young family members.\par \par -start metoprolol 25mg daily for tachycardia\par -Zio if sx do not improve on bb\par -2D echo\par -nuc stress test to eval chest pain

## 2021-12-16 NOTE — ED PROVIDER NOTE - CPE EDP GASTRO NORM
normal...

## 2021-12-23 ENCOUNTER — APPOINTMENT (OUTPATIENT)
Dept: CARDIOLOGY | Facility: CLINIC | Age: 41
End: 2021-12-23
Payer: MEDICARE

## 2021-12-23 ENCOUNTER — MED ADMIN CHARGE (OUTPATIENT)
Age: 41
End: 2021-12-23

## 2021-12-23 PROCEDURE — A9500: CPT

## 2021-12-23 PROCEDURE — 78452 HT MUSCLE IMAGE SPECT MULT: CPT

## 2021-12-23 PROCEDURE — 93015 CV STRESS TEST SUPVJ I&R: CPT

## 2021-12-23 RX ORDER — REGADENOSON 0.08 MG/ML
0.4 INJECTION, SOLUTION INTRAVENOUS
Qty: 1 | Refills: 0 | Status: COMPLETED | OUTPATIENT
Start: 2021-12-23

## 2021-12-23 RX ADMIN — REGADENOSON 4 MG/5ML: 0.08 INJECTION, SOLUTION INTRAVENOUS at 00:00

## 2022-01-11 DIAGNOSIS — J02.9 ACUTE PHARYNGITIS, UNSPECIFIED: ICD-10-CM

## 2022-01-25 ENCOUNTER — APPOINTMENT (OUTPATIENT)
Dept: NEUROLOGY | Facility: CLINIC | Age: 42
End: 2022-01-25
Payer: MEDICARE

## 2022-01-25 VITALS
HEART RATE: 81 BPM | HEIGHT: 67 IN | RESPIRATION RATE: 16 BRPM | WEIGHT: 163 LBS | DIASTOLIC BLOOD PRESSURE: 89 MMHG | BODY MASS INDEX: 25.58 KG/M2 | SYSTOLIC BLOOD PRESSURE: 142 MMHG

## 2022-01-25 PROCEDURE — 95970 ALYS NPGT W/O PRGRMG: CPT

## 2022-01-25 PROCEDURE — 99214 OFFICE O/P EST MOD 30 MIN: CPT | Mod: 25

## 2022-01-25 NOTE — PHYSICAL EXAM
[FreeTextEntry1] : There is mild masking. EOMI. Speech2+ There is intermittent, 2+ right leg rest tremor. There is 3+ bradykinesia L>R. Foot tapping worse on the left. Shuffles when walking but has not FOG. Heel strike is ok. no lid\par

## 2022-01-25 NOTE — HISTORY OF PRESENT ILLNESS
[FreeTextEntry1] : Patient had COVID on 1/10\par \par Has on LID that affects both hemibody. When he takes 1/2 tab it is better controlled\par He is slower and stiffer since having covid\par Wife states he is more short tempered as well\par Cardiology started him on BB prn as he develops palpitation after taking LD\par \par \par Meds\par sinemet 1 tab q3hrs ( starts at 3P and ends at 3AM)

## 2022-01-25 NOTE — PROCEDURE
[FreeTextEntry1] : abbot\par \par Right DBS\par Contacts: 10A-11+ \par Parameters:2.3/70/130 \par Impedances:ok\par Battery:2.94\par \par Notes:\par \par Final Settings: unch \par \par \par Left DBS\par Contacts: 2B-\par Parameters:3.6/50/184 (6-21)\par Impedances:ok\par Battery:\par \par Notes: \par \par Final Settings: unch\par \par \par Programming time: 5\par

## 2022-01-25 NOTE — DISCUSSION/SUMMARY
[FreeTextEntry1] : Increwased bradykinesia s/p COVID\par \par Patient was counseled on the following recommendations:\par \par DBS unchanged\par change sinemet  to 1/2 tab q3hrs\par Increase exercises and will monitor his symptoms over next several weeks \par \par f/u 3months

## 2022-02-01 ENCOUNTER — APPOINTMENT (OUTPATIENT)
Dept: CARDIOLOGY | Facility: CLINIC | Age: 42
End: 2022-02-01
Payer: MEDICARE

## 2022-02-01 PROCEDURE — 93306 TTE W/DOPPLER COMPLETE: CPT

## 2022-02-03 ENCOUNTER — APPOINTMENT (OUTPATIENT)
Dept: CARDIOLOGY | Facility: CLINIC | Age: 42
End: 2022-02-03
Payer: MEDICARE

## 2022-02-03 VITALS
BODY MASS INDEX: 25.9 KG/M2 | HEART RATE: 97 BPM | OXYGEN SATURATION: 96 % | DIASTOLIC BLOOD PRESSURE: 84 MMHG | SYSTOLIC BLOOD PRESSURE: 126 MMHG | HEIGHT: 67 IN | WEIGHT: 165 LBS

## 2022-02-03 DIAGNOSIS — R07.9 CHEST PAIN, UNSPECIFIED: ICD-10-CM

## 2022-02-03 PROCEDURE — 99214 OFFICE O/P EST MOD 30 MIN: CPT

## 2022-02-03 NOTE — HISTORY OF PRESENT ILLNESS
[FreeTextEntry1] : Theresa 42yo man with a PMH of early onset Parkinsons on Sinemet and s/p deep brain stimulator.\par He reports that after taking levodopa, he experiences significant dyskinesia with tachycardia 100-130s; can last 20-45min before resolving.  \par Has also started experiencing some chest pains; very strong hx of CAD with multiple episodes of SCD in young family members.\par \par 2/3/22:\par s/p echo and stress test\par Echo with normal LVEF\par Stress test with no ischemia but LVEF 10-15%\par Tachycardias MUCH improved on bbs

## 2022-02-03 NOTE — DISCUSSION/SUMMARY
[FreeTextEntry1] : Theresa 40yo man with a PMH of early onset Parkinsons on Sinemet and s/p deep brain stimulator.\par He reports that after taking levodopa, he experiences significant dyskinesia with tachycardia 100-130s; can last 20-45min before resolving.  \par Has also started experiencing some chest pains; very strong hx of CAD with multiple episodes of SCD in young family members.\par Echo with normal LVEF\par Stress test with no ischemia but LVEF 10-15%\par -cont metoprolol 25mg daily for tachycardia\par -cardiac MRI to evaluate very discrepant LVEFs... ?infiltrative heart disease with genetic predisposition

## 2022-03-14 ENCOUNTER — RX RENEWAL (OUTPATIENT)
Age: 42
End: 2022-03-14

## 2022-03-24 ENCOUNTER — APPOINTMENT (OUTPATIENT)
Dept: MRI IMAGING | Facility: CLINIC | Age: 42
End: 2022-03-24

## 2022-04-14 NOTE — ED ADULT NURSE NOTE - CHPI ED NUR SYMPTOMS NEG
no back pain/no chest pain/no chills/no congestion/no dizziness/no nausea/no shortness of breath/no syncope/no vomiting no

## 2022-04-21 ENCOUNTER — APPOINTMENT (OUTPATIENT)
Dept: NEUROLOGY | Facility: CLINIC | Age: 42
End: 2022-04-21
Payer: MEDICARE

## 2022-04-21 VITALS
HEIGHT: 67 IN | DIASTOLIC BLOOD PRESSURE: 90 MMHG | SYSTOLIC BLOOD PRESSURE: 139 MMHG | HEART RATE: 92 BPM | WEIGHT: 165 LBS | BODY MASS INDEX: 25.9 KG/M2

## 2022-04-21 PROCEDURE — 95983 ALYS BRN NPGT PRGRMG 15 MIN: CPT

## 2022-04-21 PROCEDURE — 99214 OFFICE O/P EST MOD 30 MIN: CPT | Mod: 25

## 2022-04-21 NOTE — PROCEDURE
[FreeTextEntry1] : [employer]ott\par \par Right DBS\par Contacts: 10A-11+  (6-21)\par Parameters:2.3/70/130 \par Impedances:ok\par Battery:2.93\par \par Notes:10A-11+: 3/50/136 no change in aamir\par 10-c+: 2.6/50/140 minimal change. Inc paresthesia\par 10C-:2/50/140- speech ok. gait is ok. Left hand movements are better with less rigidity \par 10A- 10 BC+11+ : 3/50/140 increased tremior\par \par \par Final Settings: 10c: 2.6/50/140 (1-3) grp 4/21/22\par \par \par Left DBS\par Contacts: 2B-\par Parameters:3.6/50/184 (6-21)\par Impedances:ok\par Battery:\par \par Notes: \par Final Settings: unch\par 2B: 3.2/50/180 (new) good for sp\par \par DBS programming time : 15mins\par

## 2022-04-21 NOTE — PHYSICAL EXAM
[FreeTextEntry1] : Last dose 15mins ago\par \par There is mild masking. EOMI. Speech is intermittently dysarthric, but comprehensible. Tremor is absent. There is 3+ bradykinesia left 1+ on the right 2+ rigidity L>R. . SL are good with no FOG

## 2022-04-21 NOTE — DISCUSSION/SUMMARY
[FreeTextEntry1] : PD with STN DBS with worsened off time possible 2/2 COVID\par \par Patient was counseled on the following recommendations:\par - DBS adjusted\par cont sinemet 1-1.5 tabs q3hrs\par f/u 2 weeks\par

## 2022-04-21 NOTE — HISTORY OF PRESENT ILLNESS
[FreeTextEntry1] : Patient having more off time. LD kick in time is variable. Some doses can take a few hrs to take effect\par His left side is stiffer and has been more immobile with greater fatigue since having Covid in January\par When he takes extra tab of sinemet, he experiences bothersome dyskinesia\par \par Meds\par sinemet 1 tab q3hrs ( starts at 3P and ends at 3AM)

## 2022-06-02 ENCOUNTER — APPOINTMENT (OUTPATIENT)
Dept: INTERNAL MEDICINE | Facility: CLINIC | Age: 42
End: 2022-06-02
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
## 2022-07-12 NOTE — ED PROVIDER NOTE - NSTIMEPROVIDERCAREINITIATE_GEN_ER
[facility] states that pt is not adherent to the TRULICITY and the PRAVASTATIN    Ref Number [id number] 25-Aug-2017 09:59

## 2022-07-18 ENCOUNTER — RX RENEWAL (OUTPATIENT)
Age: 42
End: 2022-07-18

## 2022-08-23 NOTE — ED ADULT TRIAGE NOTE - NS ED TRIAGE AVPU SCALE
CHIEF COMPLAINT: Generalized muscle pain secondary to rhabdomyolysis    History Obtained From:  patient     HISTORY OF PRESENT ILLNESS:      The patient is a 34 y.o. female with significant past medical history of glycogen storage disease who presents with fall out of bed at home leading to generalized muscle pain. Pain is similar to previous exacerbations of rhabdomyolysis. This 1 or related to trauma in 2 known cause rhabdo. States that during the night she rolled out of bed and landed on her hardwood floor. She returned to bed but had increased muscle pain. Presented to the emergency room for fluids and evaluation of her CK level. It was elevated at 2600. She has been admitted for definitive treatment of her condition. 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    Headache     Hypertension     McArdle disease (HCC)     Movement disorder     McArdles disease    Psychiatric problem     Anxiety, Depression    Renal failure        Past Surgical History:   Procedure Laterality Date    APPENDECTOMY      CHOLECYSTECTOMY      COLONOSCOPY      Normal 2019    ENDOSCOPY, COLON, DIAGNOSTIC      Normal 2019    MUSCLE BIOPSY      SALPINGECTOMY Right     SKIN BIOPSY      Muscle Biopsy 2007    TONSILLECTOMY      UPPER GASTROINTESTINAL ENDOSCOPY N/A 06/04/2021    Dr Nery Jane       Medications Prior to Admission:    Medications Prior to Admission: clonazePAM (KLONOPIN) 2 MG tablet, Take 2 mg by mouth 2 times daily as needed for Anxiety. pregabalin (LYRICA) 75 MG capsule, Take 75 mg by mouth 3 times daily.   cyclobenzaprine (FLEXERIL) 10 MG tablet, Take 10 mg by mouth 3 times daily as needed for Muscle spasms  baclofen (LIORESAL) 10 MG tablet, Take 10 mg by mouth 3 times daily  famotidine (PEPCID) 20 MG tablet, Take 1 tablet by mouth 2 times daily  dicyclomine (BENTYL) 10 MG capsule, Take 10 mg by mouth three times daily  sodium bicarbonate 325 MG tablet, Take 325 mg by mouth daily  escitalopram (LEXAPRO) 10 MG tablet, Take 1 tablet by mouth daily  promethazine (PHENERGAN) 25 MG tablet, Take 25 mg by mouth every 6 hours as needed for Nausea  promethazine (PHENERGAN) 25 MG suppository, Place 25 mg rectally every 6 hours as needed for Nausea  traZODone (DESYREL) 50 MG tablet, Take 1 tablet by mouth nightly  SUMAtriptan (IMITREX) 25 MG tablet, Take 25 mg by mouth once as needed for Migraine  ondansetron (ZOFRAN ODT) 4 MG disintegrating tablet, Take 1 tablet by mouth every 8 hours as needed for Nausea or Vomiting  busPIRone (BUSPAR) 10 MG tablet, Take 1 tablet by mouth 2 times daily  losartan (COZAAR) 25 MG tablet, Take 1 tablet by mouth daily  tiZANidine (ZANAFLEX) 4 MG tablet, Take 4 mg by mouth every 8 hours as needed  amLODIPine (NORVASC) 10 MG tablet, Take 1 tablet by mouth daily  Cholecalciferol (VITAMIN D3) 125 MCG (5000 UT) CAPS, Take 5,000 Units by mouth daily  scopolamine (TRANSDERM-SCOP) transdermal patch, Place 1 patch onto the skin every 72 hours    Allergies:    Aspirin, Nsaids, Other, Azithromycin, Hydrocodone, and Sulfamethoxazole-trimethoprim    Social History:    reports that she has never smoked. She has never used smokeless tobacco. She reports current alcohol use. She reports that she does not currently use drugs. Family History:   family history includes Cancer in her maternal grandfather; Diabetes in her maternal grandfather; High Blood Pressure in her mother; Other in her maternal grandmother. REVIEW OF SYSTEMS:  As above in the HPI, otherwise negative    PHYSICAL EXAM:    Vitals:  BP (!) 138/97   Pulse 61   Temp 97.2 °F (36.2 °C)   Resp 17   Ht 5' 4\" (1.626 m)   Wt 242 lb 4 oz (109.9 kg)   SpO2 100%   BMI 41.58 kg/m²     General Appearance:  awake, alert, oriented, in no acute distress and obese  Skin:  Skin color, texture, turgor normal. No rashes or lesions.   Head/face: NCAT  Eyes:  No gross abnormalities. Neck:  neck- supple, no mass, non-tender  Lungs:  Normal expansion. Clear to auscultation. No rales, rhonchi, or wheezing. Heart:  Heart regular rate and rhythm  Abdomen:  Soft, non-tender, normal bowel sounds. No bruits, organomegaly or masses. Extremities: Extremities warm to touch, pink, with no edema. Musculoskeletal: Generalized tenderness across the back and upper legs as well as shoulder girdles.   No joint swelling or abnormalities  Neurologic:  negative    DATA:  CBC with Differential:    Lab Results   Component Value Date/Time    WBC 8.1 08/22/2022 08:03 AM    RBC 4.57 08/22/2022 08:03 AM    HGB 11.6 08/22/2022 08:03 AM    HCT 39.6 08/22/2022 08:03 AM    HCT 35.9 03/04/2011 05:24 AM     08/22/2022 08:03 AM     03/04/2011 05:24 AM    MCV 86.7 08/22/2022 08:03 AM    MCH 25.4 08/22/2022 08:03 AM    MCHC 29.3 08/22/2022 08:03 AM    RDW 16.6 08/22/2022 08:03 AM    METASPCT 1 09/06/2020 08:23 AM    LYMPHOPCT 55.2 07/19/2022 05:51 AM    MONOPCT 6.7 07/19/2022 05:51 AM    EOSPCT 2.3 03/04/2011 05:24 AM    BASOPCT 0.5 07/19/2022 05:51 AM    MONOSABS 0.60 07/19/2022 05:51 AM    LYMPHSABS 4.8 07/19/2022 05:51 AM    EOSABS 0.10 07/19/2022 05:51 AM    BASOSABS 0.00 07/19/2022 05:51 AM     CMP:    Lab Results   Component Value Date/Time     08/23/2022 03:02 AM     03/04/2011 05:24 AM    K 4.4 08/23/2022 03:02 AM    K 4.4 08/22/2022 09:00 AM    K 4.4 03/04/2011 05:24 AM     08/23/2022 03:02 AM     03/04/2011 05:24 AM    CO2 25 08/23/2022 03:02 AM    BUN 9 08/23/2022 03:02 AM    CREATININE 0.8 08/23/2022 03:02 AM    CREATININE 0.6 03/04/2011 05:24 AM    GFRAA >59 08/23/2022 03:02 AM    LABGLOM >60 08/23/2022 03:02 AM    GLUCOSE 103 08/23/2022 03:02 AM    PROT 5.9 08/22/2022 09:00 AM    PROT 5.8 03/04/2011 05:24 AM    LABALBU 4.0 08/22/2022 09:00 AM    LABALBU 4.1 03/04/2011 05:24 AM    CALCIUM 8.5 08/23/2022 03:02 AM    BILITOT <0.2 08/22/2022 09:00 AM    ALKPHOS 84 08/22/2022 09:00 AM    ALKPHOS 65 03/04/2011 05:24 AM    AST 31 08/22/2022 09:00 AM    ALT 27 08/22/2022 09:00 AM     Creatinine kinase 2620    ASSESSMENT:      Patient Active Problem List   Diagnosis    Vomiting    Chronic pain disorder    Glycogen storage disease type 5 (HCC)    Anxiety    Tension-type headache    Migraine without aura    Elevated CK    Elevated CPK    Abnormal liver enzymes    Chronic myofascial pain    Intractable vomiting with nausea    Intractable nausea and vomiting    Depression    Lower abdominal pain    Adenomyosis of uterus    Moderate episode of recurrent major depressive disorder (HCC)    Anxiety disorder    Mild episode of recurrent major depressive disorder (HCC)    Blood glucose abnormal    Rhabdomyolysis       PLAN:    IV fluids and as needed medication for pain control. Has had a drop this morning and CK level down to 1480. Cautiously optimistic that we have caught her condition early and that we can [place] control, but based on past history this does have a chance of shooting back up as sometimes with her CK level plateau is delayed from symptoms. Continue to monitor through the day and recheck CK level tomorrow.     Electronically signed by Jose E Ken MD on 8/23/2022 at 8:38 AM no Alert-The patient is alert, awake and responds to voice. The patient is oriented to time, place, and person. The triage nurse is able to obtain subjective information.

## 2022-08-24 NOTE — ED ADULT TRIAGE NOTE - ARRIVAL FROM
- Increased urinary frequency and incontinence, present on presentation   - No other evidence of UTI with normal/unremarkable UA, no leukocytosis, and no fevers  - No indication for antibiotics at this time   - Recommend outpatient follow-up with PCP  Home

## 2022-09-01 ENCOUNTER — APPOINTMENT (OUTPATIENT)
Dept: INTERNAL MEDICINE | Facility: CLINIC | Age: 42
End: 2022-09-01

## 2022-09-01 VITALS
WEIGHT: 160 LBS | DIASTOLIC BLOOD PRESSURE: 64 MMHG | HEIGHT: 67 IN | OXYGEN SATURATION: 97 % | BODY MASS INDEX: 25.11 KG/M2 | SYSTOLIC BLOOD PRESSURE: 108 MMHG | RESPIRATION RATE: 16 BRPM | HEART RATE: 71 BPM

## 2022-09-01 DIAGNOSIS — Z12.5 ENCOUNTER FOR SCREENING FOR MALIGNANT NEOPLASM OF PROSTATE: ICD-10-CM

## 2022-09-01 DIAGNOSIS — R00.0 TACHYCARDIA, UNSPECIFIED: ICD-10-CM

## 2022-09-01 DIAGNOSIS — Z78.9 OTHER SPECIFIED HEALTH STATUS: ICD-10-CM

## 2022-09-01 DIAGNOSIS — M86.9 OSTEOMYELITIS, UNSPECIFIED: ICD-10-CM

## 2022-09-01 DIAGNOSIS — Z82.49 FAMILY HISTORY OF ISCHEMIC HEART DISEASE AND OTHER DISEASES OF THE CIRCULATORY SYSTEM: ICD-10-CM

## 2022-09-01 DIAGNOSIS — J45.20 MILD INTERMITTENT ASTHMA, UNCOMPLICATED: ICD-10-CM

## 2022-09-01 DIAGNOSIS — Z00.00 ENCOUNTER FOR GENERAL ADULT MEDICAL EXAMINATION W/OUT ABNORMAL FINDINGS: ICD-10-CM

## 2022-09-01 DIAGNOSIS — Z83.3 FAMILY HISTORY OF DIABETES MELLITUS: ICD-10-CM

## 2022-09-01 PROCEDURE — G0439: CPT

## 2022-09-01 PROCEDURE — 99203 OFFICE O/P NEW LOW 30 MIN: CPT | Mod: 25

## 2022-09-01 PROCEDURE — 36415 COLL VENOUS BLD VENIPUNCTURE: CPT

## 2022-09-01 RX ORDER — AZITHROMYCIN 250 MG/1
250 TABLET, FILM COATED ORAL
Qty: 1 | Refills: 0 | Status: DISCONTINUED | COMMUNITY
Start: 2022-01-11 | End: 2022-09-01

## 2022-09-01 NOTE — REVIEW OF SYSTEMS
[Chest Pain] : chest pain [Negative] : Heme/Lymph [FreeTextEntry5] : tachycardia - has not been taking the beta blocker [FreeTextEntry9] : stiffness, dyskinesia [de-identified] : as noted

## 2022-09-01 NOTE — PHYSICAL EXAM
[No Acute Distress] : no acute distress [Well Nourished] : well nourished [Well Developed] : well developed [Well-Appearing] : well-appearing [Normal Voice/Communication] : normal voice/communication [Normal Sclera/Conjunctiva] : normal sclera/conjunctiva [PERRL] : pupils equal round and reactive to light [EOMI] : extraocular movements intact [Normal Outer Ear/Nose] : the outer ears and nose were normal in appearance [No JVD] : no jugular venous distention [No Lymphadenopathy] : no lymphadenopathy [Supple] : supple [Thyroid Normal, No Nodules] : the thyroid was normal and there were no nodules present [No Respiratory Distress] : no respiratory distress  [No Accessory Muscle Use] : no accessory muscle use [Clear to Auscultation] : lungs were clear to auscultation bilaterally [Normal Rate] : normal rate  [Regular Rhythm] : with a regular rhythm [Normal S1, S2] : normal S1 and S2 [No Murmur] : no murmur heard [No Carotid Bruits] : no carotid bruits [No Abdominal Bruit] : a ~M bruit was not heard ~T in the abdomen [Pedal Pulses Present] : the pedal pulses are present [No Edema] : there was no peripheral edema [Soft] : abdomen soft [Non Tender] : non-tender [Non-distended] : non-distended [No Masses] : no abdominal mass palpated [No HSM] : no HSM [Normal Bowel Sounds] : normal bowel sounds [Normal Posterior Cervical Nodes] : no posterior cervical lymphadenopathy [Normal Anterior Cervical Nodes] : no anterior cervical lymphadenopathy [No CVA Tenderness] : no CVA  tenderness [No Spinal Tenderness] : no spinal tenderness [No Joint Swelling] : no joint swelling [Grossly Normal Strength/Tone] : grossly normal strength/tone [Coordination Grossly Intact] : coordination grossly intact [Memory Grossly Normal] : memory grossly normal [Normal Affect] : the affect was normal [Alert and Oriented x3] : oriented to person, place, and time [Normal Mood] : the mood was normal [Normal Insight/Judgement] : insight and judgment were intact [de-identified] : noted stiffness [de-identified] : stiffness [de-identified] : moving all ext with good strength.  + stiffness. Able to get out of the wheelchair and onto the exam table w/o issues. [de-identified] : speech very slightly slurred, about normal.

## 2022-09-01 NOTE — HISTORY OF PRESENT ILLNESS
[Spouse] : spouse [FreeTextEntry1] : annual PE\par establish care for med issues.   [de-identified] : 41 y/o male with hx PD s/p deep brain stimulator, headaches, cardiomyopathy, asthma here to establish care\par For annual Pe and his med issues.\par Has been following with neurology.  Due for f/u.  Reports improvement of the PD sx with the deep brain stimulator.  Has been on sinemet.  Still with some stiffness and dyskinesia\par Has seen Cardiology for tachycardia - Echo with nl EF.  Stress with EF 10-15%.  Referred for further testing.  Reports that may get the imaging done in the hospital due to having to shut off the DBS for MRI.  Reports that he has not been taking the beta blocker given for the tachycardia/CM\par with hx asthma.  Gets sx in the winter.  Takes albuterol as needed.\par \par gets flu vaccine\par had covid vaccines - to get booster.\par had pneumo '21

## 2022-09-01 NOTE — HEALTH RISK ASSESSMENT
[Former] : Former [No] : In the past 12 months have you used drugs other than those required for medical reasons? No [0] : 2) Feeling down, depressed, or hopeless: Not at all (0) [PHQ-2 Negative - No further assessment needed] : PHQ-2 Negative - No further assessment needed [No Retinopathy] : No retinopathy [None] : None [With Family] : lives with family [] :  [Feels Safe at Home] : Feels safe at home [Reports changes in dental health] : Reports changes in dental health [Smoke Detector] : smoke detector [Carbon Monoxide Detector] : carbon monoxide detector [Seat Belt] :  uses seat belt [No falls in past year] : Patient reported no falls in the past year [[id number]] : 0 [EyeExamDate] : 5/22 [Change in mental status noted] : No change in mental status noted [High Risk Behavior] : no high risk behavior [Reports changes in hearing] : Reports no changes in hearing [Reports changes in vision] : Reports no changes in vision [Sunscreen] : does not use sunscreen [de-identified] : needs some assistance with some ADL, ability waxes and wanes [de-identified] : needs assistance [de-identified] : needs dental work - has appt.

## 2022-09-02 LAB
25(OH)D3 SERPL-MCNC: 16 NG/ML
ALBUMIN SERPL ELPH-MCNC: 4.7 G/DL
ALP BLD-CCNC: 80 U/L
ALT SERPL-CCNC: 13 U/L
ANION GAP SERPL CALC-SCNC: 15 MMOL/L
AST SERPL-CCNC: 17 U/L
BASOPHILS # BLD AUTO: 0.05 K/UL
BASOPHILS NFR BLD AUTO: 0.7 %
BILIRUB SERPL-MCNC: 0.9 MG/DL
BUN SERPL-MCNC: 19 MG/DL
CALCIUM SERPL-MCNC: 9.9 MG/DL
CHLORIDE SERPL-SCNC: 105 MMOL/L
CHOLEST SERPL-MCNC: 163 MG/DL
CO2 SERPL-SCNC: 23 MMOL/L
CREAT SERPL-MCNC: 0.78 MG/DL
EGFR: 116 ML/MIN/1.73M2
EOSINOPHIL # BLD AUTO: 0.2 K/UL
EOSINOPHIL NFR BLD AUTO: 3 %
ESTIMATED AVERAGE GLUCOSE: 114 MG/DL
GLUCOSE SERPL-MCNC: 104 MG/DL
HBA1C MFR BLD HPLC: 5.6 %
HCT VFR BLD CALC: 51.6 %
HDLC SERPL-MCNC: 40 MG/DL
HGB BLD-MCNC: 15.7 G/DL
IMM GRANULOCYTES NFR BLD AUTO: 0.3 %
LDLC SERPL CALC-MCNC: 93 MG/DL
LYMPHOCYTES # BLD AUTO: 2.12 K/UL
LYMPHOCYTES NFR BLD AUTO: 31.3 %
MAN DIFF?: NORMAL
MCHC RBC-ENTMCNC: 26.4 PG
MCHC RBC-ENTMCNC: 30.4 GM/DL
MCV RBC AUTO: 86.7 FL
MONOCYTES # BLD AUTO: 0.44 K/UL
MONOCYTES NFR BLD AUTO: 6.5 %
NEUTROPHILS # BLD AUTO: 3.94 K/UL
NEUTROPHILS NFR BLD AUTO: 58.2 %
NONHDLC SERPL-MCNC: 123 MG/DL
PLATELET # BLD AUTO: 299 K/UL
POTASSIUM SERPL-SCNC: 4.5 MMOL/L
PROT SERPL-MCNC: 7.1 G/DL
PSA SERPL-MCNC: 1.75 NG/ML
RBC # BLD: 5.95 M/UL
RBC # FLD: 13 %
SODIUM SERPL-SCNC: 143 MMOL/L
TRIGL SERPL-MCNC: 150 MG/DL
TSH SERPL-ACNC: 1.89 UIU/ML
WBC # FLD AUTO: 6.77 K/UL

## 2022-09-13 NOTE — PATIENT PROFILE ADULT. - NUTRITION PROFILE
no indicators present Azithromycin Pregnancy And Lactation Text: This medication is considered safe during pregnancy and is also secreted in breast milk.

## 2022-09-19 ENCOUNTER — NON-APPOINTMENT (OUTPATIENT)
Age: 42
End: 2022-09-19

## 2022-09-27 ENCOUNTER — APPOINTMENT (OUTPATIENT)
Dept: NEUROLOGY | Facility: CLINIC | Age: 42
End: 2022-09-27

## 2022-09-27 VITALS
HEIGHT: 67 IN | RESPIRATION RATE: 18 BRPM | WEIGHT: 165 LBS | HEART RATE: 84 BPM | SYSTOLIC BLOOD PRESSURE: 126 MMHG | DIASTOLIC BLOOD PRESSURE: 78 MMHG | BODY MASS INDEX: 25.9 KG/M2

## 2022-09-27 PROCEDURE — 99214 OFFICE O/P EST MOD 30 MIN: CPT | Mod: 25

## 2022-09-27 PROCEDURE — 95983 ALYS BRN NPGT PRGRMG 15 MIN: CPT

## 2022-09-27 RX ORDER — AMANTADINE HYDROCHLORIDE 100 1/1
100 TABLET ORAL
Qty: 60 | Refills: 4 | Status: ACTIVE | COMMUNITY
Start: 2022-09-27 | End: 1900-01-01

## 2022-09-27 NOTE — HISTORY OF PRESENT ILLNESS
[FreeTextEntry1] : Since his last visit, he reports that the new DBS program helps his dyskinesia but doesn’t improve his gait. He usually lowers stimulation amplitude during the day to control LID. \par He has difficulty standing and transferring from to and from a car due to off periods\par Uses a wheelchair at times. He prefers to break his sinemet into smaller pieces and ingest 1/2-3/4 tabs [id number]\par Mentally he is stable\par Wife states that patient is taking more levodopa than he is saying. \par No dysphagia\par recently diagnosed with cardiomyopathy and needs cardiac MRI\par \par Meds\par sinemet 1 tab q2-3hrs ( starts at 3P and ends at 3AM)

## 2022-09-27 NOTE — PHYSICAL EXAM
[FreeTextEntry1] : off MEDS\par There is mild masking. EOMI. Speech is intermittently dysarthric, but comprehensible.  There is 2+ bradykinesia L>r. . tone is normal. Walks with better SL and no FOG. Good heel strike though left side is slower. No dyskinesia or FOG\par

## 2022-09-27 NOTE — DISCUSSION/SUMMARY
[FreeTextEntry1] : PD with STN DBS who continues to fluctuate and have dyskinesia. Left side bradykinesia requires higher stimulation but c/b dyskinesia\par \par Patient was counseled on the following recommendations:\par - DBS adjusted\par - trial of amantadine 100mg BID. AEs reviewed. May help with LID and gait\par - try to lower sinemet to 1tab q3hrs\par \par f/u 2-3months\par

## 2022-09-27 NOTE — PROCEDURE
[FreeTextEntry1] : abbott\par \par Right DBS\par Contacts: 10A-11+  (6-21)\par Parameters:2.3/70/130 \par Impedances:ok\par Battery:2.92\par \par Notes:10A-11+: 2.5-2.8mA improves hand movements. Left foto swing is better\par \par \par Final Settings: 10A-11+  (6-21) 2.8/70/130 (1.5-3)\par \par Left DBS\par Contacts: 2B-\par Parameters:3.6/50/184 (6-21)\par Impedances:ok\par Battery:\par \par Notes: \par Final Settings: unch\par \par \par DBS programming time : 10mins\par

## 2022-11-07 ENCOUNTER — RX RENEWAL (OUTPATIENT)
Age: 42
End: 2022-11-07

## 2022-11-29 NOTE — ED ADULT NURSE NOTE - HOW OFTEN DO YOU HAVE A DRINK CONTAINING ALCOHOL?
[FreeTextEntry1] : Patient is a 44-year-old  2 para 2 last menstrual period 2022\par Patient presents for annual visit,,, denies any complaints Never

## 2023-01-24 ENCOUNTER — APPOINTMENT (OUTPATIENT)
Dept: NEUROLOGY | Facility: CLINIC | Age: 43
End: 2023-01-24
Payer: MEDICARE

## 2023-01-24 PROCEDURE — 99213 OFFICE O/P EST LOW 20 MIN: CPT | Mod: 95

## 2023-01-24 NOTE — DISCUSSION/SUMMARY
[FreeTextEntry1] : PD with STN DBS who continues to fluctuate and have dyskinesia, but gait improved with amantadine which he takes prn\par \par Patient was counseled on the following recommendations:\par - Advised that he take  amantadine 100mg BID. AEs reviewed. May help with LID and gait\par - cont current sinemet regimen\par - dbs not changed today\par - increase physical activity\par \par f/u 2-3months\par . \par \par

## 2023-01-24 NOTE — HISTORY OF PRESENT ILLNESS
[FreeTextEntry1] : Patient reports that his gait has improved with amantadine\par He is taking sinemet 3/4 tabs every 2hrs. When he takes full tab he gets dyskinesia that can be bothersome. \par pending w/u for cardiomyopathy with possible angiogram\par Has not had any falls. \par Has gained weight and not exercising\par \par \par Meds\par sinemet1 tab BID and 3/4 tab q2-3hrs ( starts at 12P and ends at 1AM)\par amantadine 100mg qd

## 2023-01-24 NOTE — PHYSICAL EXAM
[FreeTextEntry1] : \par There is mild masking. EOMI. Speech is intermittently dysarthric, but comprehensible.  There is 2+ bradykinesia L>r. .gait deferred

## 2023-01-31 NOTE — ED PROVIDER NOTE - PROGRESS NOTE DETAILS
General Sunscreen Counseling: I recommended a broad spectrum sunscreen with a SPF of 30 or higher daily, zinc oxide or titanium dioxide products preferred. Detail Level: Generalized Paged pt's neurologist. Did not hear back from pt's neurologist. Pt felt less nauseous after zofran, took his evening dose of carbidopa/levodopa. Pt ate a tuna sandwich and apple sauce, feels much better.

## 2023-03-08 ENCOUNTER — RX RENEWAL (OUTPATIENT)
Age: 43
End: 2023-03-08

## 2023-03-17 ENCOUNTER — APPOINTMENT (OUTPATIENT)
Dept: NEUROLOGY | Facility: CLINIC | Age: 43
End: 2023-03-17
Payer: MEDICARE

## 2023-03-17 ENCOUNTER — NON-APPOINTMENT (OUTPATIENT)
Age: 43
End: 2023-03-17

## 2023-03-17 VITALS
HEART RATE: 83 BPM | WEIGHT: 156 LBS | DIASTOLIC BLOOD PRESSURE: 80 MMHG | SYSTOLIC BLOOD PRESSURE: 132 MMHG | HEIGHT: 67 IN | BODY MASS INDEX: 24.48 KG/M2

## 2023-03-17 PROCEDURE — 99214 OFFICE O/P EST MOD 30 MIN: CPT | Mod: 25

## 2023-03-17 PROCEDURE — 95970 ALYS NPGT W/O PRGRMG: CPT

## 2023-03-17 RX ORDER — METOPROLOL SUCCINATE 25 MG/1
25 TABLET, EXTENDED RELEASE ORAL DAILY
Qty: 90 | Refills: 3 | Status: COMPLETED | COMMUNITY
Start: 2021-12-15 | End: 2023-03-17

## 2023-03-17 RX ORDER — CYCLOBENZAPRINE HYDROCHLORIDE 10 MG/1
10 TABLET, FILM COATED ORAL
Refills: 0 | Status: COMPLETED | COMMUNITY
End: 2023-03-17

## 2023-03-17 NOTE — PROCEDURE
[FreeTextEntry1] : abbott\par \par Right DBS\par Contacts: 10A-11+  (6-21)\par Parameters:2.8/70/130 \par Impedances:ok\par Battery:2.89\par \par Notes:\par \par Final Settings: unch (1.5-3)\par \par Left DBS\par Contacts: 2B-\par Parameters:3.6/50/184 (6-21)\par Impedances:ok\par Battery:\par \par Notes: 3.8mA created mild right leg limp\par \par Final Settings: unch (1.7-3.8)\par 2B: 3.2/50/180 (new) good for sp\par \par DBS programming time : 7mins\par

## 2023-03-17 NOTE — PHYSICAL EXAM
[FreeTextEntry1] : off MEDS\par There is mild masking. EOMI. Speech is intermittently dysarthric, but comprehensible.  There is 2+ bradykinesia L>r. . tone is normal. Walks with better SL and no FOG. Good heel strike . No dyskinesia or FOG\par

## 2023-03-17 NOTE — DISCUSSION/SUMMARY
[FreeTextEntry1] : PD with STN DBS who continues to fluctuate and have dyskinesia, but gait improved with amantadine which he takes prn\par \par Patient was counseled on the following recommendations:\par - Advised that he take amantadine 100mg qd\par - cont current sinemet regimen\par - dbs not changed today, has option to raise amplitude at home to address off periods\par - increase physical activity, f/u PCP re. weight loss\par - f/u cards\par \par f/u 4-6months\par . \par

## 2023-03-17 NOTE — HISTORY OF PRESENT ILLNESS
[FreeTextEntry1] : Patient has more dyskinesia daily. He takes amantadine prn. he takes 1 tab sinemet in the morning and night. In between, he takes 1/2 - 3/4 tabs q2hrs. when he wears off he feels particularly slow.\par Gait is somewhat unsteady,. \par Has had a 10lb unintentional weight loss\par He is pending further cardiac work-up for his cardiomyopathy\par \par \par Meds\par sinemet1 tab BID and 3/4 tab q2-3hrs ( starts at 12P and ends at 1AM)  \par amantadine 100mg qd prn

## 2023-07-12 NOTE — PROGRESS NOTE ADULT - PROBLEM SELECTOR PROBLEM 2
HCAP (healthcare-associated pneumonia)
HCAP (healthcare-associated pneumonia)
Parkinson disease
Parkinson disease
- - -

## 2023-07-17 NOTE — ED ADULT NURSE NOTE - NSSISCREENINGQ4_ED_A_ED
Problem: Pain  Goal: #Acceptable pain level achieved/maintained at rest using NRS/Faces  Description: This goal is used for patients who can self-report.  Acceptable means the level is at or below the identified comfort/function goal.  Outcome: Outcome Met, Continue evaluating goal progress toward completion  Goal: # Acceptable pain level achieved/maintained at rest using NRS/Faces without oversedation (opioid naive or PCA/Epidural infusion)  Description: This goal is used if Opioid-naïve or on PCA/Epidural Infusion.  Outcome: Outcome Met, Continue evaluating goal progress toward completion  Goal: # Acceptable pain level achieved/maintained with activity using NRS/Faces  Description: This goal is used for patients who can self-report and are not achieving acceptable pain control during activity.  Outcome: Outcome Met, Continue evaluating goal progress toward completion  Goal: Acceptable pain/comfort level is achieved/maintained at rest (based on Pain Behaviors Scale)  Description: This goal is used for patients who are not able to self-report pain and are assessed for pain using the Pain Behaviors Scale  Outcome: Outcome Met, Continue evaluating goal progress toward completion  Goal: Acceptable pain/comfort level is achieved/maintained at rest based on PAINAID scale (Dementia)  Description: This goal is used for patients who are not able to self-report pain, have dementia, and assessed using the PAINAD scale.  Outcome: Outcome Met, Continue evaluating goal progress toward completion  Goal: Acceptable pain/comfort level is achieved/maintained at rest (based on pediatric behavior tool: NIPS, NPASS, or FLACC)  Description: This goal is used for pediatric patients who are not able to self report pain.  Outcome: Outcome Met, Continue evaluating goal progress toward completion  Goal: # Verbalizes understanding of pain management  Description: Documented in Patient Education Activity  Outcome: Outcome Met, Continue  evaluating goal progress toward completion  Goal: Verbalizes understanding and effective use of Patient Controlled Analgesia (PCA)  Description: Documented in Patient Education Activity  This goal is used for patients with PCA  Outcome: Outcome Met, Continue evaluating goal progress toward completion  Goal: Maximum comfort achieved/maintained at end of life (Hospice)  Outcome: Outcome Met, Continue evaluating goal progress toward completion     Problem: At Risk for Falls  Goal: # Patient does not fall  Outcome: Outcome Met, Continue evaluating goal progress toward completion  Goal: # Takes action to control fall-related risks  Outcome: Outcome Met, Continue evaluating goal progress toward completion  Goal: # Verbalizes understanding of fall risk/precautions  Description: Document education using the patient education activity  Outcome: Outcome Met, Continue evaluating goal progress toward completion     Problem: At Risk for Injury Due to Fall  Goal: # Patient does not fall  Outcome: Outcome Met, Continue evaluating goal progress toward completion  Goal: # Takes action to control condition specific risks  Outcome: Outcome Met, Continue evaluating goal progress toward completion  Goal: # Verbalizes understanding of fall-related injury personal risks  Description: Document education using the patient education activity  Outcome: Outcome Met, Continue evaluating goal progress toward completion     Problem: VTE, Risk for  Goal: # No s/s of VTE  Outcome: Outcome Met, Continue evaluating goal progress toward completion  Goal: # Verbalizes understanding of VTE risk factors and prevention  Description: Document education using the patient education activity.   Outcome: Outcome Met, Continue evaluating goal progress toward completion  Goal: Demonstrates ability to administer injectable anticoagulants if ordered for d/c  Description: Document education using the patient education activity.  Outcome: Outcome Met, Continue  evaluating goal progress toward completion     Problem: VTE (Actual)  Goal: # Verbalizes understanding of VTE and treatment plan  Description: Document education using the patient education activity.   Outcome: Outcome Met, Continue evaluating goal progress toward completion     Problem: Total Joint Replacement  Goal: Vital signs are maintained within parameters  Outcome: Outcome Met, Continue evaluating goal progress toward completion  Goal: Mobility is maintained/achieved within the limitation specified by physician order  Description: Maintain modified weight bearing based on physician order. Hips Only-Initiate/maintain hip precautions.  Outcome: Outcome Met, Continue evaluating goal progress toward completion  Goal: Elimination status is maintained/returned to baseline  Description: Remove indwelling urinary catheter as soon as possible or collaborate with provider for order/reason for continued use.   Outcome: Outcome Met, Continue evaluating goal progress toward completion  Goal: Oral intake is resumed and tolerated  Outcome: Outcome Met, Continue evaluating goal progress toward completion  Goal: Verbalizes understanding of Total joint replacement  Description: Document on Patient Education Activity   Outcome: Outcome Met, Continue evaluating goal progress toward completion      No

## 2023-07-21 ENCOUNTER — RX RENEWAL (OUTPATIENT)
Age: 43
End: 2023-07-21

## 2023-08-01 ENCOUNTER — NON-APPOINTMENT (OUTPATIENT)
Age: 43
End: 2023-08-01

## 2023-08-03 NOTE — ED PROVIDER NOTE - NS_EDPROVIDERDISPOUSERTYPE_ED_A_ED
Discussed with ER physician. 81 yo female with rectal bleeding. No abdominal pain. Full consult to follow.   Clear liquid diet Attending Attestation (For Attendings USE Only)...

## 2023-09-14 ENCOUNTER — APPOINTMENT (OUTPATIENT)
Dept: NEUROLOGY | Facility: CLINIC | Age: 43
End: 2023-09-14
Payer: MEDICARE

## 2023-09-14 VITALS — DIASTOLIC BLOOD PRESSURE: 84 MMHG | HEART RATE: 80 BPM | SYSTOLIC BLOOD PRESSURE: 121 MMHG

## 2023-09-14 DIAGNOSIS — F41.9 ANXIETY DISORDER, UNSPECIFIED: ICD-10-CM

## 2023-09-14 PROCEDURE — 95970 ALYS NPGT W/O PRGRMG: CPT

## 2023-09-14 PROCEDURE — 99214 OFFICE O/P EST MOD 30 MIN: CPT | Mod: 25

## 2023-10-27 ENCOUNTER — RX RENEWAL (OUTPATIENT)
Age: 43
End: 2023-10-27

## 2023-11-07 NOTE — ED ADULT NURSE NOTE - IS THE PATIENT ABLE TO BE SCREENED?
PHYSICIAN ORDERS AND DISCHARGE INSTRUCTIONS     Wound cleansing:              Do not scrub or use excessive force. Wash hands with soap and water before and after dressing changes. Prior to applying a clean dressing, cleanse wound with normal saline,               wound cleanser, or mild soap and water. Ask the physician or nurse before getting the wound(s) wet in a shower     Daily Wound management:             Keep weight off wounds and reposition every 2 hours. Avoid standing for long periods of time. Apply wraps/stockings in AM and remove at bedtime. If swelling is present, elevate legs to the level of the heart or above for              30 minutes 4-5 times a day and/or when sitting. When taking antibiotics take entire prescription as ordered by              physician do not stop taking until medicine is all gone. Wound Care Notes:         Narinder Redding to find out about purewick and wheelchair. Reliable medical equipment to do home visit. Orders for this week: 2023     FAX ORDERS TO [facility]! Please restart care. Change on : Order Supplies!!! Right Buttock wounds - Wash with soap and water, pat dry. Primary Dressing: puracol dampened with anasept spray   Secondary Dressing: calcium alginate, gentac. Secure with mastisol and tegaderm   Leave dressing in place for 3 days. []   Nurse Visit   Follow Up Instructions: At the [address] [place] [address] in 1 week   Primary Wound Care Provider: Greyson Colón CNP   Call  for any questions or concerns.   Central [phone] for imaging lab work Yes

## 2024-01-16 ENCOUNTER — APPOINTMENT (OUTPATIENT)
Dept: NEUROLOGY | Facility: CLINIC | Age: 44
End: 2024-01-16
Payer: MEDICARE

## 2024-01-16 ENCOUNTER — NON-APPOINTMENT (OUTPATIENT)
Age: 44
End: 2024-01-16

## 2024-01-16 ENCOUNTER — APPOINTMENT (OUTPATIENT)
Dept: NEUROSURGERY | Facility: CLINIC | Age: 44
End: 2024-01-16
Payer: MEDICARE

## 2024-01-16 VITALS
DIASTOLIC BLOOD PRESSURE: 74 MMHG | BODY MASS INDEX: 23.7 KG/M2 | WEIGHT: 151 LBS | HEART RATE: 74 BPM | HEIGHT: 67 IN | SYSTOLIC BLOOD PRESSURE: 116 MMHG | OXYGEN SATURATION: 97 %

## 2024-01-16 DIAGNOSIS — Z45.42 ENC FOR ADJUSTMENT AND MANAGEMENT OF NEUROSTIMULATOR: ICD-10-CM

## 2024-01-16 PROCEDURE — 99214 OFFICE O/P EST MOD 30 MIN: CPT | Mod: 25

## 2024-01-16 PROCEDURE — 95970 ALYS NPGT W/O PRGRMG: CPT

## 2024-01-16 PROCEDURE — 99215 OFFICE O/P EST HI 40 MIN: CPT

## 2024-01-16 NOTE — PHYSICAL EXAM
[FreeTextEntry1] : There is mild masking. EOMI. Speech is intermittently dysarthric, but comprehensible.  There is 2+ bradykinesia L>r. . tone is normal. Walks with better SL and no FOG. Good heel strike though left side is slower. No dyskinesia or FOG

## 2024-01-16 NOTE — HISTORY OF PRESENT ILLNESS
[FreeTextEntry1] : Patient's DBS IPG is at end of life He is taking lexapro and feels his anxiety has improved. Notes improvement in his speech Does not wear off regularly  Has dyskinesia after taking LD, which is not too bothersome. switches between program '6-21' and 'new' throughout the day; 'New' prg reduces LID Denies any falls Sleep is fragmented   Meds sinemet1 tab at 11AM, 1/2 tab q2hrs amantadine 100mg qd prn lexapro 5mg qd xanax 0.25mg qd prn

## 2024-01-16 NOTE — PROCEDURE
[FreeTextEntry1] : xiao  Right DBS Contacts: 10A-11+  (6-21) Parameters:2.8/70/130  Impedances:ok Battery:<2.73  Notes:   Final Settings: NO CHANGES NEW: 2.2/60/130   Left DBS Contacts: 2B- Parameters:3.6/50/184 (6-21) Impedances:ok Battery:  Notes:  Final Settings: unch 2B: 3.2/50/180 (new) good for sp  DBS programming time : 15mins

## 2024-01-16 NOTE — DISCUSSION/SUMMARY
[FreeTextEntry1] : YOPD s/p STN DBS whose mood/anxiety has improved. LID remains problematic IPG at [facility]  Patient was counseled on the following recommendations: DBS unchanged will leave PD med regimen.  Discussed with him to take amantadine 100mg qd as he is switching between program often to alleviate LID will see Dr. Rausch for IPG replacement.   f./u 3months

## 2024-01-17 PROBLEM — Z45.42 END OF BATTERY LIFE OF DEEP BRAIN STIMULATOR: Status: ACTIVE | Noted: 2021-04-14

## 2024-01-17 NOTE — PHYSICAL EXAM
[General Appearance - Alert] : alert [General Appearance - In No Acute Distress] : in no acute distress [Oriented To Time, Place, And Person] : oriented to person, place, and time [Mood] : the mood was normal [Person] : oriented to person [Place] : oriented to place [Time] : oriented to time [Short Term Intact] : short term memory intact [Remote Intact] : remote memory intact [Cranial Nerves Oculomotor (III)] : extraocular motion intact [Cranial Nerves Trigeminal (V)] : facial sensation intact symmetrically [Cranial Nerves Facial (VII)] : face symmetrical [Cranial Nerves Vestibulocochlear (VIII)] : hearing was intact bilaterally [Cranial Nerves Accessory (XI - Cranial And Spinal)] : head turning and shoulder shrug symmetric [Cranial Nerves Hypoglossal (XII)] : there was no tongue deviation with protrusion [Motor Strength] : muscle strength was normal in all four extremities [] : no respiratory distress [Respiration, Rhythm And Depth] : normal respiratory rhythm and effort [Skin Color & Pigmentation] : normal skin color and pigmentation

## 2024-01-18 NOTE — ASSESSMENT
[FreeTextEntry1] : IMPRESSION: 43 year M with PMH of PD s/p b/l STN DBS 2017, asthma, anxiety, cardiomyopathy. He presents for consult for IPG exchange d/t end of life (James). Last IPG exchange was 5/2021.  DBS settings checked in office today, no changes made. Left STN-DBS: 2B-, C+ amp: 3.60 mA pw: 50 us freq: 184 hz  Right STN-DBS: 10A-, 11+ amp: 2.8 mA pw: 70 us freq: 130 hz  program and system impedances ok battery <2.73 V, got warning 3 weeks ago to have IPG exchanged  The patient will have removal and replacement of Abbott IPG at the [facility]. He will have sedation. This would be an outpatient procedure and the patient would go home that same day along with pain meds and scheduled antibiotics for ~1 week. He would need to coordinate a ride home from surgery, can't drive. Return to clinic 2 weeks later for assessment of surgical incisions and staple removal.   I discussed the risk and benefits of the procedure including bleeding, infection. The battery will be turned back on immediately with current DBS settings. He will continue to follow up with neurology for programming/medication management.   The patient shows good understanding of the procedure, the risk and benefits, and wishes to proceed with the procedure as soon as possible.    PLAN: Will schedule for right IPG exchange PST needed  I, Dr. Roshan Rausch, personally performed the evaluation and management (E/M) services for this new patient.  That E/M includes conducting the clinically appropriate initial history &/or exam, assessing all conditions, and establishing the plan of care.  Today, my FRANCIS, Vanessa Lomeli, was here to observe my evaluation and management service for this patient & follow plan of care established by me going forward.

## 2024-01-18 NOTE — HISTORY OF PRESENT ILLNESS
[FreeTextEntry1] : end of life DBS IPG [de-identified] : BRIAN NAGEL is a 43 year old male with PMH of severe Parkinson's disease since 2013, asthma, arthritis, anxiety, depression. He is not on anticoagulants or antiplatelets. He presents to the office for neurosurgical consultation for IPG exchange as battery is end of life. He is under the care of movement disorder neurologist Dr. Lovett. He is s/p b/l STN-DBS in 2017 with one IPG by Dr. Rodriguez ([facility]). He is s/p right IPG exchange by Dr. Tello in May 2021. He is happy with DBS as it has greatly improved his quality of life.

## 2024-01-24 ENCOUNTER — OUTPATIENT (OUTPATIENT)
Dept: OUTPATIENT SERVICES | Facility: HOSPITAL | Age: 44
LOS: 1 days | End: 2024-01-24
Payer: MEDICARE

## 2024-01-24 VITALS
SYSTOLIC BLOOD PRESSURE: 106 MMHG | DIASTOLIC BLOOD PRESSURE: 72 MMHG | OXYGEN SATURATION: 99 % | WEIGHT: 151.9 LBS | TEMPERATURE: 98 F | RESPIRATION RATE: 16 BRPM | HEART RATE: 76 BPM | HEIGHT: 67 IN

## 2024-01-24 DIAGNOSIS — Z86.69 PERSONAL HISTORY OF OTHER DISEASES OF THE NERVOUS SYSTEM AND SENSE ORGANS: ICD-10-CM

## 2024-01-24 DIAGNOSIS — Z98.890 OTHER SPECIFIED POSTPROCEDURAL STATES: Chronic | ICD-10-CM

## 2024-01-24 DIAGNOSIS — Z01.818 ENCOUNTER FOR OTHER PREPROCEDURAL EXAMINATION: ICD-10-CM

## 2024-01-24 DIAGNOSIS — G20.A1 PARKINSON'S DISEASE WITHOUT DYSKINESIA, WITHOUT MENTION OF FLUCTUATIONS: ICD-10-CM

## 2024-01-24 DIAGNOSIS — Z96.89 PRESENCE OF OTHER SPECIFIED FUNCTIONAL IMPLANTS: Chronic | ICD-10-CM

## 2024-01-24 LAB
ANION GAP SERPL CALC-SCNC: 12 MMOL/L — SIGNIFICANT CHANGE UP (ref 5–17)
BUN SERPL-MCNC: 19 MG/DL — SIGNIFICANT CHANGE UP (ref 7–23)
CALCIUM SERPL-MCNC: 9.5 MG/DL — SIGNIFICANT CHANGE UP (ref 8.4–10.5)
CHLORIDE SERPL-SCNC: 102 MMOL/L — SIGNIFICANT CHANGE UP (ref 96–108)
CO2 SERPL-SCNC: 27 MMOL/L — SIGNIFICANT CHANGE UP (ref 22–31)
CREAT SERPL-MCNC: 0.76 MG/DL — SIGNIFICANT CHANGE UP (ref 0.5–1.3)
EGFR: 116 ML/MIN/1.73M2 — SIGNIFICANT CHANGE UP
GLUCOSE SERPL-MCNC: 95 MG/DL — SIGNIFICANT CHANGE UP (ref 70–99)
HCT VFR BLD CALC: 50.4 % — HIGH (ref 39–50)
HGB BLD-MCNC: 16.1 G/DL — SIGNIFICANT CHANGE UP (ref 13–17)
MCHC RBC-ENTMCNC: 27.5 PG — SIGNIFICANT CHANGE UP (ref 27–34)
MCHC RBC-ENTMCNC: 31.9 GM/DL — LOW (ref 32–36)
MCV RBC AUTO: 86 FL — SIGNIFICANT CHANGE UP (ref 80–100)
NRBC # BLD: 0 /100 WBCS — SIGNIFICANT CHANGE UP (ref 0–0)
PLATELET # BLD AUTO: 283 K/UL — SIGNIFICANT CHANGE UP (ref 150–400)
POTASSIUM SERPL-MCNC: 3.9 MMOL/L — SIGNIFICANT CHANGE UP (ref 3.5–5.3)
POTASSIUM SERPL-SCNC: 3.9 MMOL/L — SIGNIFICANT CHANGE UP (ref 3.5–5.3)
RBC # BLD: 5.86 M/UL — HIGH (ref 4.2–5.8)
RBC # FLD: 12.7 % — SIGNIFICANT CHANGE UP (ref 10.3–14.5)
SODIUM SERPL-SCNC: 141 MMOL/L — SIGNIFICANT CHANGE UP (ref 135–145)
WBC # BLD: 6.53 K/UL — SIGNIFICANT CHANGE UP (ref 3.8–10.5)
WBC # FLD AUTO: 6.53 K/UL — SIGNIFICANT CHANGE UP (ref 3.8–10.5)

## 2024-01-24 PROCEDURE — 87640 STAPH A DNA AMP PROBE: CPT

## 2024-01-24 PROCEDURE — G0463: CPT

## 2024-01-24 PROCEDURE — 80048 BASIC METABOLIC PNL TOTAL CA: CPT

## 2024-01-24 PROCEDURE — 87641 MR-STAPH DNA AMP PROBE: CPT

## 2024-01-24 PROCEDURE — 85027 COMPLETE CBC AUTOMATED: CPT

## 2024-01-24 RX ORDER — CHLORHEXIDINE GLUCONATE 213 G/1000ML
1 SOLUTION TOPICAL DAILY
Refills: 0 | Status: DISCONTINUED | OUTPATIENT
Start: 2024-01-29 | End: 2024-02-12

## 2024-01-24 RX ORDER — SODIUM CHLORIDE 9 MG/ML
3 INJECTION INTRAMUSCULAR; INTRAVENOUS; SUBCUTANEOUS EVERY 8 HOURS
Refills: 0 | Status: DISCONTINUED | OUTPATIENT
Start: 2024-01-29 | End: 2024-02-12

## 2024-01-24 RX ORDER — SODIUM CHLORIDE 9 MG/ML
1000 INJECTION, SOLUTION INTRAVENOUS
Refills: 0 | Status: DISCONTINUED | OUTPATIENT
Start: 2024-01-29 | End: 2024-02-12

## 2024-01-24 RX ORDER — VANCOMYCIN HCL 1 G
1000 VIAL (EA) INTRAVENOUS ONCE
Refills: 0 | Status: DISCONTINUED | OUTPATIENT
Start: 2024-01-29 | End: 2024-02-12

## 2024-01-24 RX ORDER — LIDOCAINE HCL 20 MG/ML
0.2 VIAL (ML) INJECTION ONCE
Refills: 0 | Status: DISCONTINUED | OUTPATIENT
Start: 2024-01-29 | End: 2024-02-12

## 2024-01-24 NOTE — H&P PST ADULT - NSICDXPASTMEDICALHX_GEN_ALL_CORE_FT
PAST MEDICAL HISTORY:  Anxiety     Arthritis     Asthma     Osteomyelitis Right middle- Resolved 2018    Parkinson disease     Pneumonia      PAST MEDICAL HISTORY:  Anxiety     Arthritis     Asthma     H/O cardiomyopathy     Osteomyelitis Right middle- Resolved 2018    Parkinson disease

## 2024-01-24 NOTE — H&P PST ADULT - OTHER CARE PROVIDERS
Dr. Eduardo Lovett  (neuro), [address] , [phone] Dr. Eduardo Lovett  (neuro), [address], [facility] , [phone] Dr. Eduardo Lovett  (neuro), [address], [facility] , [phone] preop eval advised

## 2024-01-24 NOTE — H&P PST ADULT - NSICDXPROCEDURE_GEN_ALL_CORE_FT
PROCEDURES:  Insertion, spinal pulse generator 24-Jan-2024 18:17:58 Abbott implantable Pulse Generator Exchange on 01/29/24. Diego Thompson

## 2024-01-24 NOTE — H&P PST ADULT - PROBLEM SELECTOR PLAN 3
Advised for preop cardiac eval with latest cardiac reports . Advised for preop cardiac eval with latest cardiac reports .  **** Addendum  1/25/24: called pt/ spoke to his wife that patient need to follow up with his cardiologist' for preop cardiac eval & as per cardiologist's office last visit in 02/2022 advised for Cardiac imaging by cardiologist.  1/26/24 emailed Team/ surgeon & Anes> pending preop cardiac eval for h/o abnormal stress test in 2021 with pending cardiac imaging.

## 2024-01-24 NOTE — H&P PST ADULT - ASSESSMENT
End of DBS battery, having Abbott implantable Pulse Generator Exchange on 01/29/24.    Activity:   walks idoor, strtching exercise daily  Energy Expenditure score (DASI SCORE METS): 4  Symptoms : denies chest pain, palpitations, dyspnea, dizziness or  HAYES,   Dental: Patient denies Loose teeth/Denture.  End of DBS battery: having Abbott implantable Pulse Generator Exchange on 01/29/24.    Activity:   walks indoor stretching exercise daily  Energy Expenditure score (DASI SCORE METS): 4  Symptoms : denies chest pain, palpitations, dyspnea, dizziness or  HAYES,   Dental: Patient denies Loose teeth/Denture.  End of DBS battery: having Abbott implantable Pulse Generator Exchange on 01/29/24.    Activity:   walks indoor, does stretching exercise daily  Energy Expenditure score (DASI SCORE METS): 4  Symptoms : denies chest pain, palpitations, dyspnea, dizziness or  HAYES,   Dental: Patient denies Loose teeth/Denture.

## 2024-01-24 NOTE — H&P PST ADULT - HISTORY OF PRESENT ILLNESS
43 year old male with PMH of severe Parkinson's disease since 2013, asthma ( last rescue  inhaler 6  months ago), arthritis, anxiety, depression. He is under the care of movement disorder neurologist Dr. Lovett & noticed battery is  is end of life.  S/p STJ  DBS b/l implanted (11/2017),  and Craniotomy (2018) He is not on anticoagulants or antiplatelets. He is s/p right IPG exchange by Dr. Tello in May 2021. He is happy with DBS as it has greatly improved his quality of life. Pt is now presenting to [facility] for Abbott implantable Pulse Generator Exchange on 01/29/24.   43 year old male with PMH of severe Parkinson's disease since 2013, asthma ( last rescue  inhaler 6  months ago), arthritis, anxiety, depression. He is under the care of movement disorder neurologist Dr. Lovett & noticed battery is  is end of life.  S/p STJ  DBS b/l implanted (11/2017),  and Craniotomy (2018) He is not on anticoagulants or antiplatelets. He is s/p right IPG exchange by Dr. Tello in May 2021. He is happy with DBS as it has greatly improved his quality of life. Pt is now presenting to [facility] for Abbott implantable Pulse Generator Exchange on 01/29/24.

## 2024-01-24 NOTE — H&P PST ADULT - PROBLEM SELECTOR PLAN 1
End of DBS battery, having Abbott implantable Pulse Generator Exchange on 01/29/24.  preop cbc, bmp, mrsa/mssa sent.

## 2024-01-24 NOTE — H&P PST ADULT - SOURCE OF INFORMATION, PROFILE
patient/family wife/patient/family wife Ms jorge ali /patient/family wife Ms Cindy ali /patient/family

## 2024-01-24 NOTE — H&P PST ADULT - LAST ECHOCARDIOGRAM
2022-23 02/2022 done due to cardiopa caused hycardia, EF was normal 02/2022 done due to levodopa caused tachycardia, EF was normal

## 2024-01-24 NOTE — H&P PST ADULT - CARDIOVASCULAR COMMENTS
h/o severe dyskinesis with tachycardia from Levodopa & had all work up & cardiology cleared me h/o severe dyskinesis with tachycardia from Levodopa & had cardiac work up stress test with abnormal EF in 2021 & later  echo in 2022 EF was  69 % h/o severe dyskinesis with tachycardia from Levodopa & had cardiac work up which revealed stress test with abnormal EF in 2021 &  echo in 2022 EF was  69 %

## 2024-01-24 NOTE — H&P PST ADULT - NEUROLOGICAL
details… cranial nerves II-XII intact/sensation intact/responds to pain/responds to verbal commands/deep reflexes intact sensation intact/responds to pain/responds to verbal commands/deep reflexes intact/no spontaneous movement

## 2024-01-25 DIAGNOSIS — Z86.79 PERSONAL HISTORY OF OTHER DISEASES OF THE CIRCULATORY SYSTEM: ICD-10-CM

## 2024-01-25 DIAGNOSIS — F41.9 ANXIETY DISORDER, UNSPECIFIED: ICD-10-CM

## 2024-01-25 LAB
MRSA PCR RESULT.: SIGNIFICANT CHANGE UP
S AUREUS DNA NOSE QL NAA+PROBE: DETECTED

## 2024-01-26 ENCOUNTER — APPOINTMENT (OUTPATIENT)
Dept: CARDIOLOGY | Facility: CLINIC | Age: 44
End: 2024-01-26
Payer: MEDICARE

## 2024-01-26 ENCOUNTER — NON-APPOINTMENT (OUTPATIENT)
Age: 44
End: 2024-01-26

## 2024-01-26 VITALS
HEART RATE: 78 BPM | DIASTOLIC BLOOD PRESSURE: 72 MMHG | SYSTOLIC BLOOD PRESSURE: 128 MMHG | BODY MASS INDEX: 23.86 KG/M2 | OXYGEN SATURATION: 98 % | HEIGHT: 67 IN | WEIGHT: 152 LBS

## 2024-01-26 DIAGNOSIS — Z96.89 PRESENCE OF OTHER SPECIFIED FUNCTIONAL IMPLANTS: ICD-10-CM

## 2024-01-26 DIAGNOSIS — I42.9 CARDIOMYOPATHY, UNSPECIFIED: ICD-10-CM

## 2024-01-26 PROBLEM — F41.9 ANXIETY DISORDER, UNSPECIFIED: Chronic | Status: ACTIVE | Noted: 2024-01-24

## 2024-01-26 PROCEDURE — 93000 ELECTROCARDIOGRAM COMPLETE: CPT

## 2024-01-26 PROCEDURE — 99214 OFFICE O/P EST MOD 30 MIN: CPT

## 2024-01-26 PROCEDURE — 93306 TTE W/DOPPLER COMPLETE: CPT

## 2024-01-26 PROCEDURE — G2211 COMPLEX E/M VISIT ADD ON: CPT

## 2024-01-26 RX ORDER — MUPIROCIN 20 MG/G
2 OINTMENT TOPICAL TWICE DAILY
Qty: 1 | Refills: 0 | Status: ACTIVE | COMMUNITY
Start: 2024-01-26 | End: 1900-01-01

## 2024-01-26 RX ORDER — IBUPROFEN 600 MG/1
600 TABLET, FILM COATED ORAL
Qty: 30 | Refills: 0 | Status: DISCONTINUED | COMMUNITY
Start: 2020-09-22 | End: 2024-01-26

## 2024-01-26 NOTE — DISCUSSION/SUMMARY
[Procedure Low Risk] : the procedure risk is low [Patient Low Risk] : the patient is a low surgical risk [Optimized for Surgery] : the patient is optimized for surgery [As per surgery] : as per surgery [Continue] : Continue medications as currently directed

## 2024-01-26 NOTE — HISTORY OF PRESENT ILLNESS
[Preoperative Visit] : for a medical evaluation prior to surgery [Scheduled Procedure ___] : a [unfilled] [Date of Surgery ___] : on [unfilled] [Surgeon Name ___] : surgeon: [unfilled] [Good] : Good [Electrocardiogram] : ~T an ECG ~C was performed [Echocardiogram] : ~T an echocardiogram ~C was performed [Fever] : no fever [Chills] : no chills [Fatigue] : no fatigue [Chest Pain] : no chest pain [Cough] : no cough [Dyspnea] : no dyspnea

## 2024-01-26 NOTE — PHYSICAL EXAM
[General Appearance - Well Developed] : well developed [Normal Appearance] : normal appearance [Well Groomed] : well groomed [] : no respiratory distress [Respiration, Rhythm And Depth] : normal respiratory rhythm and effort [Auscultation Breath Sounds / Voice Sounds] : lungs were clear to auscultation bilaterally [Heart Sounds] : normal S1 and S2 [Arterial Pulses Normal] : the arterial pulses were normal [Edema] : no peripheral edema present [Nail Clubbing] : no clubbing of the fingernails [Cyanosis, Localized] : no localized cyanosis [Skin Turgor] : normal skin turgor [Skin Lesions] : no skin lesions [Oriented To Time, Place, And Person] : oriented to person, place, and time [Impaired Insight] : insight and judgment were intact [Affect] : the affect was normal [Memory Recent] : recent memory was not impaired

## 2024-01-28 ENCOUNTER — TRANSCRIPTION ENCOUNTER (OUTPATIENT)
Age: 44
End: 2024-01-28

## 2024-01-29 ENCOUNTER — TRANSCRIPTION ENCOUNTER (OUTPATIENT)
Age: 44
End: 2024-01-29

## 2024-01-29 ENCOUNTER — OUTPATIENT (OUTPATIENT)
Dept: OUTPATIENT SERVICES | Facility: HOSPITAL | Age: 44
LOS: 1 days | End: 2024-01-29
Payer: MEDICARE

## 2024-01-29 ENCOUNTER — APPOINTMENT (OUTPATIENT)
Dept: NEUROSURGERY | Facility: HOSPITAL | Age: 44
End: 2024-01-29

## 2024-01-29 VITALS
SYSTOLIC BLOOD PRESSURE: 121 MMHG | RESPIRATION RATE: 15 BRPM | DIASTOLIC BLOOD PRESSURE: 73 MMHG | OXYGEN SATURATION: 98 % | TEMPERATURE: 98 F | HEART RATE: 78 BPM

## 2024-01-29 VITALS
WEIGHT: 151.9 LBS | DIASTOLIC BLOOD PRESSURE: 79 MMHG | HEIGHT: 67 IN | TEMPERATURE: 97 F | SYSTOLIC BLOOD PRESSURE: 123 MMHG | HEART RATE: 72 BPM | OXYGEN SATURATION: 99 % | RESPIRATION RATE: 16 BRPM

## 2024-01-29 DIAGNOSIS — Z98.890 OTHER SPECIFIED POSTPROCEDURAL STATES: Chronic | ICD-10-CM

## 2024-01-29 DIAGNOSIS — Z96.89 PRESENCE OF OTHER SPECIFIED FUNCTIONAL IMPLANTS: Chronic | ICD-10-CM

## 2024-01-29 DIAGNOSIS — G20.A1 PARKINSON'S DISEASE WITHOUT DYSKINESIA, WITHOUT MENTION OF FLUCTUATIONS: ICD-10-CM

## 2024-01-29 LAB — GLUCOSE BLDC GLUCOMTR-MCNC: 90 MG/DL — SIGNIFICANT CHANGE UP (ref 70–99)

## 2024-01-29 PROCEDURE — 61886 IMPLANT NEUROSTIM ARRAYS: CPT

## 2024-01-29 PROCEDURE — 82962 GLUCOSE BLOOD TEST: CPT

## 2024-01-29 PROCEDURE — C1787: CPT

## 2024-01-29 PROCEDURE — C1767: CPT

## 2024-01-29 DEVICE — IMPLANTABLE DEVICE: Type: IMPLANTABLE DEVICE | Status: FUNCTIONAL

## 2024-01-29 DEVICE — IPG INFINITY BATTERY 7: Type: IMPLANTABLE DEVICE | Status: FUNCTIONAL

## 2024-01-29 RX ORDER — AMANTADINE HCL 100 MG
1 CAPSULE ORAL
Refills: 0 | DISCHARGE

## 2024-01-29 RX ORDER — CIPROFLOXACIN LACTATE 400MG/40ML
1 VIAL (ML) INTRAVENOUS
Qty: 14 | Refills: 0
Start: 2024-01-29 | End: 2024-02-04

## 2024-01-29 RX ORDER — ONDANSETRON 8 MG/1
4 TABLET, FILM COATED ORAL ONCE
Refills: 0 | Status: COMPLETED | OUTPATIENT
Start: 2024-01-29 | End: 2024-01-29

## 2024-01-29 RX ORDER — HYDROMORPHONE HYDROCHLORIDE 2 MG/ML
0.5 INJECTION INTRAMUSCULAR; INTRAVENOUS; SUBCUTANEOUS
Refills: 0 | Status: DISCONTINUED | OUTPATIENT
Start: 2024-01-29 | End: 2024-01-29

## 2024-01-29 RX ORDER — ALPRAZOLAM 0.25 MG
1 TABLET ORAL
Refills: 0 | DISCHARGE

## 2024-01-29 RX ORDER — OXYCODONE AND ACETAMINOPHEN 5; 325 MG/1; MG/1
1 TABLET ORAL
Qty: 10 | Refills: 0
Start: 2024-01-29 | End: 2024-02-02

## 2024-01-29 RX ORDER — ESCITALOPRAM OXALATE 10 MG/1
2 TABLET, FILM COATED ORAL
Refills: 0 | DISCHARGE

## 2024-01-29 RX ADMIN — ONDANSETRON 4 MILLIGRAM(S): 8 TABLET, FILM COATED ORAL at 12:00

## 2024-01-29 RX ADMIN — SODIUM CHLORIDE 100 MILLILITER(S): 9 INJECTION, SOLUTION INTRAVENOUS at 08:13

## 2024-01-29 NOTE — ASU DISCHARGE PLAN (ADULT/PEDIATRIC) - NS MD DC FALL RISK RISK
For information on Fall & Injury Prevention, visit: [email or web address] OR  [email or web address] OR  https://www.cdc.gov/steadi/patient.html

## 2024-01-29 NOTE — ASU DISCHARGE PLAN (ADULT/PEDIATRIC) - CARE PROVIDER_API CALL
Roshan Rausch  Neurosurgery  [address], [address]  [place] [address]  Phone: [phone]  Fax: [phone]  Established Patient  Follow Up Time: Routine

## 2024-01-29 NOTE — PACU DISCHARGE NOTE - HYDRATION STATUS:
"Chief Complaint   Patient presents with     Surgical Followup     6yr POP F/u Left Total Hip Replacement DOS: 7/8/2011       72 year old  1945    Ht 1.638 m (5' 4.5\")  Wt 69 kg (152 lb 3.2 oz)  BMI 25.72 kg/m2        [facility] [employer] [id number] - [place], [place] - [address] AT [address] [place] [address] & [address]    No Known Allergies  Current Outpatient Prescriptions   Medication     glucosamine-chondroitin 500-400 MG CAPS per capsule     TURMERIC PO     MELATONIN PO     Homeopathic Products (ARNICA [place] PO)     calcium-vitamin D (CALCIUM 600 + D) 600-400 MG-UNIT per tablet     raloxifene (EVISTA) 60 MG tablet     fish oil-omega-3 fatty acids (FISH OIL) 1000 MG capsule     Loratadine 10 MG capsule     Multiple Vitamin (MULTIVITAMIN OR)     mometasone (NASONEX) 50 MCG/ACT nasal spray     polyethylene glycol - propylene glycol (SYSTANE) 0.4-0.3 % SOLN     Cholecalciferol (VITAMIN D) 1000 UNIT capsule     No current facility-administered medications for this visit.          Questionnaires:  HOOS Hip Dysfunction & Osteoarthritis Outcome Questionnaire    Hip Dysfunction & Osteoarthritis Outcome Score (HOOS), English Version LK 2.0 (Raquel SPENCER, Cynthia E, Pascual RAMOS., 2003) 12/16/2017   S1. Do you feel grinding, hear clicking or any other type of noise from your hip? Never   S2. Difficulties spreading legs wide apart Mild   S3. Difficulties to stride out when walking None   S4. How severe is your hip joint stiffness after first wakening in the morning? Mild   S5. How severe is your hip stiffness after sitting, lying or resting LATER IN THE DAY? Mild   Symptom Count 5   Symptom Sum 3   Symptom Mean 0.6   Symptom Subscale Score 85   P1. How often is your hip painful? Never   P2. Straightening your hip fully None   P3. Bending your hip FULLY None   P4. Walking on a flat surface None   P5. Going up or down stairs None   P6. At night while in bed None   P7. Sitting or lying None   P8. Standing upright None   P9. " Satisfactory Walking on a hard surface (asphalt, concrete, etc.) None   P10. Walking on an uneven surface None   Pain Count 10   Pain Sum 0   Pain Mean 0   Pain Subscale Score 100   A1. Descending stairs None   A2. Ascending stairs None   A3. Rising from sitting None   A4. Standing None   A5. Bending to the floor/ an object None   A6. Walking on a flat surface None   A7. Getting in/out of car None   A8. Going shopping None   A9. Putting on socks/stockings None   A10. Rising from bed None   A11. Taking off socks/stockings None   A12. Lying in bed (turning over, maintaining hip position) None   A13. Getting in/out of bed None   A14. Sitting None   A15. Getting on/off toilet None   A16. Heavy domestic duties (moving heavy boxes, scrubbing floors, etc.) None   A17. Light domestic duties (cooking, dusting, etc.) None   ADL Count 17   ADL Sum 0   ADL Mean 0   ADL Subscale Score 100   SP1. Squatting None   SP2. Running Mild   SP3. Twisting/pivoting on loaded leg None   SP4. Walking on uneven surface None   Sports/Rec Count 4   Sports/Rec Sum 1   Sports/Rec Mean 0.25   Sports/Rec Subscale Score 93.75   Q1. How often are you aware of your hip problem? Never   Q2. Have you modified you life style to avoid activities potentially damaging to your hip? Mildly   Q3. How much are you troubled with lack of confidence in your hip? Not at all   Q4. In general, how much difficulty do you have with your hip? None   QOL Count 4   QOL Sum 1   QOL Mean 0.25   Quality of Life Subscale Score 93.75        Promis 10 Assessment    PROMIS 10 12/16/2017   In general, would you say your health is: Very good   In general, would you say your quality of life is: Excellent   In general, how would you rate your physical health? Very good   In general, how would you rate your mental health, including your mood and your ability to think? Excellent   In general, how would you rate your satisfaction with your social activities and relationships? Excellent   In  general, please rate how well you carry out your usual social activities and roles Excellent   To what extent are you able to carry out your everyday physical activities such as walking, climbing stairs, carrying groceries, or moving a chair? Completely   How often have you been bothered by emotional problems such as feeling anxious, depressed or irritable? Rarely   How would you rate your fatigue on average? None   How would you rate your pain on average?   0 = No Pain  to  10 = Worst Imaginable Pain 1   In general, would you say your health is: 4   In general, would you say your quality of life is: 5   In general, how would you rate your physical health? 4   In general, how would you rate your mental health, including your mood and your ability to think? 5   In general, how would you rate your satisfaction with your social activities and relationships? 5   In general, please rate how well you carry out your usual social activities and roles. (This includes activities at home, at work and in your community, and responsibilities as a parent, child, spouse, employee, friend, etc.) 5   To what extent are you able to carry out your everyday physical activities such as walking, climbing stairs, carrying groceries, or moving a chair? 5   In the past 7 days, how often have you been bothered by emotional problems such as feeling anxious, depressed, or irritable? 2   In the past 7 days, how would you rate your fatigue on average? 1   In the past 7 days, how would you rate your pain on average, where 0 means no pain, and 10 means worst imaginable pain? 1   Global Mental Health Score 19   Global Physical Health Score 18   PROMIS TOTAL - SUBSCORES 37   Some recent data might be hidden              Nidia Wick C.M.A.

## 2024-01-29 NOTE — BRIEF OPERATIVE NOTE - NSICDXBRIEFPROCEDURE_GEN_ALL_CORE_FT
PROCEDURES:  Insertion, pulse generator, for deep brain stimulator in chest wall 29-Jan-2024 10:06:03  Sumaya Turner

## 2024-01-29 NOTE — ASU PATIENT PROFILE, ADULT - FALL HARM RISK - HARM RISK INTERVENTIONS

## 2024-01-29 NOTE — ASU DISCHARGE PLAN (ADULT/PEDIATRIC) - CARE COORDINATION DISCHARGE PLANNING
Pt reporting sensitivity to strong scents.  +nausea but no vomiting  Continue PRN Zofran  
Wheelchair/Stroller
No

## 2024-01-29 NOTE — ASU PATIENT PROFILE, ADULT - NSICDXPASTMEDICALHX_GEN_ALL_CORE_FT
PAST MEDICAL HISTORY:  Anxiety     Arthritis     Asthma     H/O cardiomyopathy     Osteomyelitis Right middle- Resolved 2018    Parkinson disease

## 2024-02-06 PROBLEM — Z86.79 PERSONAL HISTORY OF OTHER DISEASES OF THE CIRCULATORY SYSTEM: Chronic | Status: ACTIVE | Noted: 2024-01-25

## 2024-02-13 NOTE — ED PROVIDER NOTE - CROS ED GU ALL NEG
Detail Level: Detailed Show Aperture Variable?: Yes Consent: The patient's consent was obtained including but not limited to risks of crusting, scabbing, blistering, scarring, darker or lighter pigmentary change, recurrence, incomplete removal and infection. Render Note In Bullet Format When Appropriate: No Post-Care Instructions: I reviewed with the patient in detail post-care instructions. Patient is to wear sunprotection, and avoid picking at any of the treated lesions. Pt may apply Vaseline to crusted or scabbing areas. Duration Of Freeze Thaw-Cycle (Seconds): 0 negative...

## 2024-02-23 ENCOUNTER — APPOINTMENT (OUTPATIENT)
Dept: NEUROSURGERY | Facility: CLINIC | Age: 44
End: 2024-02-23
Payer: MEDICARE

## 2024-02-23 VITALS
DIASTOLIC BLOOD PRESSURE: 85 MMHG | HEIGHT: 67 IN | OXYGEN SATURATION: 98 % | TEMPERATURE: 98 F | HEART RATE: 65 BPM | BODY MASS INDEX: 23.86 KG/M2 | WEIGHT: 152 LBS | SYSTOLIC BLOOD PRESSURE: 129 MMHG

## 2024-02-23 DIAGNOSIS — G20.A1 PARKINSON'S DISEASE WITHOUT DYSKINESIA, WITHOUT MENTION OF FLUCTUATIONS: ICD-10-CM

## 2024-02-23 PROCEDURE — 99024 POSTOP FOLLOW-UP VISIT: CPT

## 2024-02-23 NOTE — ASSESSMENT
[FreeTextEntry1] : IMPRESSION: 43 year M with PMH of PD s/p b/l STN DBS 2017, asthma, anxiety, cardiomyopathy. He is s/p right IPG exchange 1/29/24. He presents today for post op follow up.   Right sub clavicular incision clean, dry, healing well and without drainage.  The surrounding skin was normal, not erythematous, nontender, not warm and not indurated. No signs and symptoms of infection. Staples intact, removed staples in office today with no issues. Avoid picking at scabs and the incision. Assured it is normal to feel itchiness from scab formation.   PLAN: -Continue to f/u with neurology for management of PD -Do not submerge incisions or apply vit E oil or scar cream for 5 days  -Return to office as needed, for complications of incisions or problems with device, and future battery changes

## 2024-02-23 NOTE — REVIEW OF SYSTEMS
[As Noted in HPI] : as noted in HPI [Negative] : Musculoskeletal [Suicidal] : not suicidal [de-identified] : incisions

## 2024-02-23 NOTE — REASON FOR VISIT
[de-identified] : right IPG exchange  [de-identified] : (James) [de-identified] : 1/29/24 [Spouse] : spouse

## 2024-02-23 NOTE — HISTORY OF PRESENT ILLNESS
[FreeTextEntry1] : end of life DBS IPG [de-identified] : BRIAN NAGEL is a 43 year old male with PMH of severe Parkinson's disease since 2013, asthma, arthritis, anxiety, depression. He is not on anticoagulants or antiplatelets. He presents to the office for neurosurgical consultation for IPG exchange as battery is end of life. He is under the care of movement disorder neurologist Dr. Lovett. He is s/p b/l STN-DBS in 2017 with one IPG by Dr. Rodriguez (James). He is s/p right IPG exchange by Dr. Tello in May 2021. He is happy with DBS as it has greatly improved his quality of life. He underwent b/l IPG exchange on 1/29/24.  Today he presents for post op follow up. He only took antibiotics for 3 days postop. Denies fevers, bleeding, or drainage from surgical incisions. No new complaints today.

## 2024-02-23 NOTE — PHYSICAL EXAM
[General Appearance - Alert] : alert [General Appearance - In No Acute Distress] : in no acute distress [Oriented To Time, Place, And Person] : oriented to person, place, and time [Person] : oriented to person [Mood] : the mood was normal [Place] : oriented to place [Time] : oriented to time [Remote Intact] : remote memory intact [Short Term Intact] : short term memory intact [Cranial Nerves Oculomotor (III)] : extraocular motion intact [Cranial Nerves Facial (VII)] : face symmetrical [Cranial Nerves Trigeminal (V)] : facial sensation intact symmetrically [Cranial Nerves Vestibulocochlear (VIII)] : hearing was intact bilaterally [Cranial Nerves Hypoglossal (XII)] : there was no tongue deviation with protrusion [Cranial Nerves Accessory (XI - Cranial And Spinal)] : head turning and shoulder shrug symmetric [Motor Strength] : muscle strength was normal in all four extremities [] : no respiratory distress [Respiration, Rhythm And Depth] : normal respiratory rhythm and effort [Skin Color & Pigmentation] : normal skin color and pigmentation [Clean] : clean [Dry] : dry [Healing Well] : healing well [Staple Intact] : closed with intact staples [Intact] : intact [No Drainage] : without drainage [Normal Skin] : normal [Erythema] : not erythematous [Tender] : not tender [Warm] : not warm [Indurated] : not indurated [FreeTextEntry1] : right sub clavicular

## 2024-02-26 RX ORDER — CARBIDOPA AND LEVODOPA 25; 100 MG/1; MG/1
25-100 TABLET ORAL
Qty: 405 | Refills: 3 | Status: ACTIVE | COMMUNITY
Start: 2021-01-16 | End: 1900-01-01

## 2024-04-27 NOTE — PHYSICAL THERAPY INITIAL EVALUATION ADULT - DISCHARGE DISPOSITION, PT EVAL
no skilled PT needs/home Ana Alicia MD PGY-2: labs nonactionable, patient to follow up with PMD as scheduled next week. She expressed understanding of return precautions and follow-up plan and was discharged home in stable condition.

## 2024-05-06 ENCOUNTER — RX RENEWAL (OUTPATIENT)
Age: 44
End: 2024-05-06

## 2024-05-06 RX ORDER — ESCITALOPRAM OXALATE 5 MG/1
5 TABLET ORAL DAILY
Qty: 30 | Refills: 6 | Status: ACTIVE | COMMUNITY
Start: 2023-09-14 | End: 1900-01-01

## 2024-05-28 RX ORDER — ALPRAZOLAM 0.25 MG/1
0.25 TABLET ORAL DAILY
Qty: 30 | Refills: 0 | Status: ACTIVE | COMMUNITY
Start: 2023-09-14 | End: 1900-01-01

## 2024-08-26 ENCOUNTER — APPOINTMENT (OUTPATIENT)
Dept: CARDIOLOGY | Facility: CLINIC | Age: 44
End: 2024-08-26
Payer: MEDICARE

## 2024-08-26 ENCOUNTER — NON-APPOINTMENT (OUTPATIENT)
Age: 44
End: 2024-08-26

## 2024-08-26 VITALS
HEART RATE: 76 BPM | WEIGHT: 156 LBS | HEIGHT: 67 IN | DIASTOLIC BLOOD PRESSURE: 70 MMHG | SYSTOLIC BLOOD PRESSURE: 118 MMHG | BODY MASS INDEX: 24.48 KG/M2 | OXYGEN SATURATION: 98 % | RESPIRATION RATE: 16 BRPM

## 2024-08-26 DIAGNOSIS — F41.9 ANXIETY DISORDER, UNSPECIFIED: ICD-10-CM

## 2024-08-26 DIAGNOSIS — R00.0 TACHYCARDIA, UNSPECIFIED: ICD-10-CM

## 2024-08-26 DIAGNOSIS — I42.9 CARDIOMYOPATHY, UNSPECIFIED: ICD-10-CM

## 2024-08-26 DIAGNOSIS — G20.A1 PARKINSON'S DISEASE WITHOUT DYSKINESIA, WITHOUT MENTION OF FLUCTUATIONS: ICD-10-CM

## 2024-08-26 PROCEDURE — 93000 ELECTROCARDIOGRAM COMPLETE: CPT

## 2024-08-26 PROCEDURE — 99214 OFFICE O/P EST MOD 30 MIN: CPT

## 2024-08-26 PROCEDURE — G2211 COMPLEX E/M VISIT ADD ON: CPT

## 2024-08-26 NOTE — HISTORY OF PRESENT ILLNESS
[FreeTextEntry1] : Theresa 40yo man with a PMH of early onset Parkinsons on Sinemet and s/p deep brain stimulator. He reports that after taking levodopa, he experiences significant dyskinesia with tachycardia 100-130s; can last 20-45min before resolving.   Has also started experiencing some chest pains; very strong hx of CAD with multiple episodes of SCD in young family members.  2/3/22: s/p echo and stress test Echo with normal LVEF Stress test with no ischemia but LVEF 10-15% Tachycardias MUCH improved on bbs  8/26/24: has done REMARKABLY well!! s/p DBS... dyskinesias predominantly resolved Repeat echo w/ nl LVEF!!

## 2024-08-26 NOTE — DISCUSSION/SUMMARY
[FreeTextEntry1] : Theresa 43yo man with a PMH of early onset Parkinsons on Sinemet and s/p deep brain stimulator. has done REMARKABLY well!! s/p DBS... dyskinesias predominantly resolved Repeat echo w/ nl LVEF!! Will cont to monitor [EKG obtained to assist in diagnosis and management of assessed problem(s)] : EKG obtained to assist in diagnosis and management of assessed problem(s)

## 2024-09-12 NOTE — PATIENT PROFILE ADULT. - FUNCTIONAL SCREEN CURRENT LEVEL: TRANSFERRING, MLM
"Subjective:   Teja Michel is a 52 y.o. male who presents for Sinus Problem and Ear Drainage (X1 month, right ear)      Sinus Problem  This is a new problem. The current episode started in the past 7 days (Sinus pressure and ear pain worsening x 3 days). The problem has been gradually worsening since onset. There has been no fever. His pain is at a severity of 4/10. The pain is mild. Associated symptoms include congestion, coughing, ear pain, sinus pressure and sneezing. Pertinent negatives include no chills, hoarse voice, shortness of breath or sore throat. Past treatments include nothing. The treatment provided no relief.       Review of Systems   Constitutional: Negative.  Negative for chills and fever.   HENT:  Positive for congestion, ear pain, sinus pressure and sneezing. Negative for ear discharge, hearing loss, hoarse voice, sore throat and tinnitus.    Eyes: Negative.    Respiratory:  Positive for cough. Negative for hemoptysis, sputum production, shortness of breath and wheezing.    Cardiovascular: Negative.    Gastrointestinal: Negative.    Genitourinary: Negative.    Musculoskeletal: Negative.    Skin: Negative.    Neurological: Negative.    Endo/Heme/Allergies: Negative.    Psychiatric/Behavioral: Negative.     All other systems reviewed and are negative.      Medications, Allergies, and current problem list reviewed today in Epic.     Objective:     BP (!) 128/92 (BP Location: Right arm, Patient Position: Sitting, BP Cuff Size: Adult)   Pulse (!) 117   Temp 35.9 °C (96.6 °F)   Resp 12   Ht 1.778 m (5' 10\")   Wt 106 kg (233 lb 9.6 oz)   SpO2 96%     Physical Exam  Vitals reviewed.   Constitutional:       General: He is not in acute distress.     Appearance: Normal appearance. He is not ill-appearing, toxic-appearing or diaphoretic.   HENT:      Head: Normocephalic and atraumatic.      Right Ear: Tympanic membrane, ear canal and external ear normal.      Left Ear: Tympanic membrane, " ear canal and external ear normal.      Nose: Congestion and rhinorrhea present.      Mouth/Throat:      Mouth: Mucous membranes are moist.      Pharynx: Oropharynx is clear. No oropharyngeal exudate or posterior oropharyngeal erythema.   Eyes:      Extraocular Movements: Extraocular movements intact.      Conjunctiva/sclera: Conjunctivae normal.      Pupils: Pupils are equal, round, and reactive to light.   Cardiovascular:      Rate and Rhythm: Normal rate and regular rhythm.      Pulses: Normal pulses.      Heart sounds: Normal heart sounds.   Pulmonary:      Effort: Pulmonary effort is normal.      Breath sounds: Normal breath sounds. No wheezing, rhonchi or rales.   Abdominal:      General: Abdomen is flat. Bowel sounds are normal.      Palpations: Abdomen is soft.   Musculoskeletal:         General: Normal range of motion.      Cervical back: Normal range of motion and neck supple.   Skin:     General: Skin is warm and dry.      Capillary Refill: Capillary refill takes less than 2 seconds.   Neurological:      General: No focal deficit present.      Mental Status: He is alert and oriented to person, place, and time.   Psychiatric:         Mood and Affect: Mood normal.         Behavior: Behavior normal.       Results for orders placed or performed in visit on 09/08/24   POCT CoV-2, Flu A/B, RSV by PCR   Result Value Ref Range    SARS-CoV-2 by PCR Positive (A) Negative, Invalid    Influenza virus A RNA Negative Negative, Invalid    Influenza virus B, PCR Negative Negative, Invalid    RSV, PCR Negative Negative, Invalid         Assessment/Plan:     Diagnosis and associated orders:     1. COVID-19        2. Flu-like symptoms  POCT CoV-2, Flu A/B, RSV by PCR         Comments/MDM:     Patient found to be positive today in clinic with the rapid antigen test  Discussed with patient red flag signs, encouraged use of a pulse oximeter, especially if unvaccinated and consider ER presentation if pulse oxygenation dips below  90%  At this point this patient is hemodynamically stable and eligible for outpatient therapy  Discussed supportive care measures, contagiousness, CDC guidelines  Patient demonstrated clear verbal understanding my instructions and understood the gravity of the situation as well as the need to monitor symptoms and treat this seriously  After the patient was found to be Covid positive on the rapid antigen test I donned my appropriate PPE and had a prolonged conversation with the patient of the above situation.            Differential diagnosis, natural history, supportive care, and indications for immediate follow-up discussed.    Advised the patient to follow-up with the primary care physician for recheck, reevaluation, and consideration of further management.    Please note that this dictation was created using voice recognition software. I have made a reasonable attempt to correct obvious errors, but I expect that there are errors of grammar and possibly content that I did not discover before finalizing the note.    This note was electronically signed by JOSE RAUL Briseno   (2) assistive person

## 2024-11-14 NOTE — DISCHARGE NOTE ADULT - NSTOBACCOHOTLINE_GEN_A_NCS
well developed, well nourished , in no acute distress , ambulating without difficulty , normal communication ability  [facility] Smokers Quitline ([phone])

## 2025-01-09 ENCOUNTER — RX RENEWAL (OUTPATIENT)
Age: 45
End: 2025-01-09

## 2025-01-14 ENCOUNTER — NON-APPOINTMENT (OUTPATIENT)
Age: 45
End: 2025-01-14

## 2025-01-14 ENCOUNTER — APPOINTMENT (OUTPATIENT)
Dept: NEUROLOGY | Facility: CLINIC | Age: 45
End: 2025-01-14
Payer: MEDICARE

## 2025-01-14 VITALS
BODY MASS INDEX: 24.33 KG/M2 | DIASTOLIC BLOOD PRESSURE: 84 MMHG | HEIGHT: 67 IN | WEIGHT: 155 LBS | SYSTOLIC BLOOD PRESSURE: 134 MMHG | HEART RATE: 80 BPM

## 2025-01-14 PROCEDURE — 99214 OFFICE O/P EST MOD 30 MIN: CPT | Mod: 25

## 2025-01-14 PROCEDURE — 95970 ALYS NPGT W/O PRGRMG: CPT

## 2025-01-31 NOTE — ED ADULT NURSE NOTE - IS THE PATIENT ABLE TO BE SCREENED?
[place] [employer] [place] has received APPROVED authorization.  Insurance:   [employer]   Auth obtained via Insurance Rep:  Saida  Authorization received for: SNF  Facility:  [facility]:   Authorization #:[id number]   Start of Care: 1/30  Next Review Date: 2/03  Continued Stay Care Coordinator:  N/A:   Submit next review to: Matt chahal [phone]     Care Manager notified: Mary Sky,     Please reach out to CM for updates on any clinical information.    Yes

## 2025-02-18 RX ORDER — AMANTADINE HYDROCHLORIDE 100 MG/1
100 CAPSULE ORAL TWICE DAILY
Qty: 180 | Refills: 3 | Status: ACTIVE | COMMUNITY
Start: 2025-02-18 | End: 1900-01-01

## 2025-03-13 ENCOUNTER — APPOINTMENT (OUTPATIENT)
Dept: INTERNAL MEDICINE | Facility: CLINIC | Age: 45
End: 2025-03-13
Payer: MEDICARE

## 2025-03-13 VITALS
OXYGEN SATURATION: 98 % | DIASTOLIC BLOOD PRESSURE: 85 MMHG | SYSTOLIC BLOOD PRESSURE: 128 MMHG | BODY MASS INDEX: 24.48 KG/M2 | WEIGHT: 156 LBS | HEIGHT: 67 IN | HEART RATE: 88 BPM

## 2025-03-13 DIAGNOSIS — G20.A1 PARKINSON'S DISEASE WITHOUT DYSKINESIA, WITHOUT MENTION OF FLUCTUATIONS: ICD-10-CM

## 2025-03-13 DIAGNOSIS — Z00.00 ENCOUNTER FOR GENERAL ADULT MEDICAL EXAMINATION W/OUT ABNORMAL FINDINGS: ICD-10-CM

## 2025-03-13 DIAGNOSIS — G47.9 SLEEP DISORDER, UNSPECIFIED: ICD-10-CM

## 2025-03-13 DIAGNOSIS — I42.9 CARDIOMYOPATHY, UNSPECIFIED: ICD-10-CM

## 2025-03-13 DIAGNOSIS — Z96.89 PRESENCE OF OTHER SPECIFIED FUNCTIONAL IMPLANTS: ICD-10-CM

## 2025-03-13 DIAGNOSIS — F41.9 ANXIETY DISORDER, UNSPECIFIED: ICD-10-CM

## 2025-03-13 DIAGNOSIS — R23.4 CHANGES IN SKIN TEXTURE: ICD-10-CM

## 2025-03-13 DIAGNOSIS — E55.9 VITAMIN D DEFICIENCY, UNSPECIFIED: ICD-10-CM

## 2025-03-13 DIAGNOSIS — J45.20 MILD INTERMITTENT ASTHMA, UNCOMPLICATED: ICD-10-CM

## 2025-03-13 PROCEDURE — G0439: CPT

## 2025-03-13 PROCEDURE — 36415 COLL VENOUS BLD VENIPUNCTURE: CPT

## 2025-03-13 RX ORDER — ALBUTEROL SULFATE 90 UG/1
108 (90 BASE) INHALANT RESPIRATORY (INHALATION)
Qty: 1 | Refills: 2 | Status: ACTIVE | COMMUNITY
Start: 2025-03-13 | End: 1900-01-01

## 2025-03-14 LAB
25(OH)D3 SERPL-MCNC: 18.5 NG/ML
ALBUMIN SERPL ELPH-MCNC: 4.9 G/DL
ALP BLD-CCNC: 70 U/L
ALT SERPL-CCNC: 5 U/L
ANION GAP SERPL CALC-SCNC: 12 MMOL/L
AST SERPL-CCNC: 19 U/L
BASOPHILS # BLD AUTO: 0.06 K/UL
BASOPHILS NFR BLD AUTO: 0.8 %
BILIRUB SERPL-MCNC: 0.8 MG/DL
BUN SERPL-MCNC: 20 MG/DL
CALCIUM SERPL-MCNC: 9.9 MG/DL
CHLORIDE SERPL-SCNC: 102 MMOL/L
CHOLEST SERPL-MCNC: 146 MG/DL
CO2 SERPL-SCNC: 27 MMOL/L
CREAT SERPL-MCNC: 0.78 MG/DL
EGFRCR SERPLBLD CKD-EPI 2021: 114 ML/MIN/1.73M2
EOSINOPHIL # BLD AUTO: 0.18 K/UL
EOSINOPHIL NFR BLD AUTO: 2.5 %
ESTIMATED AVERAGE GLUCOSE: 111 MG/DL
GLUCOSE SERPL-MCNC: 102 MG/DL
HBA1C MFR BLD HPLC: 5.5 %
HCT VFR BLD CALC: 52.2 %
HDLC SERPL-MCNC: 47 MG/DL
HGB BLD-MCNC: 16.6 G/DL
IMM GRANULOCYTES NFR BLD AUTO: 0.1 %
LDLC SERPL CALC-MCNC: 82 MG/DL
LYMPHOCYTES # BLD AUTO: 2.15 K/UL
LYMPHOCYTES NFR BLD AUTO: 29.7 %
MAN DIFF?: NORMAL
MCHC RBC-ENTMCNC: 27.2 PG
MCHC RBC-ENTMCNC: 31.8 G/DL
MCV RBC AUTO: 85.6 FL
MONOCYTES # BLD AUTO: 0.4 K/UL
MONOCYTES NFR BLD AUTO: 5.5 %
NEUTROPHILS # BLD AUTO: 4.44 K/UL
NEUTROPHILS NFR BLD AUTO: 61.4 %
NONHDLC SERPL-MCNC: 99 MG/DL
PLATELET # BLD AUTO: 326 K/UL
POTASSIUM SERPL-SCNC: 4.4 MMOL/L
PROT SERPL-MCNC: 7.2 G/DL
PSA SERPL-MCNC: 0.97 NG/ML
RBC # BLD: 6.1 M/UL
RBC # FLD: 12.6 %
SODIUM SERPL-SCNC: 141 MMOL/L
TRIGL SERPL-MCNC: 88 MG/DL
TSH SERPL-ACNC: 1.83 UIU/ML
WBC # FLD AUTO: 7.24 K/UL

## 2025-04-23 ENCOUNTER — RX RENEWAL (OUTPATIENT)
Age: 45
End: 2025-04-23

## 2025-04-27 ENCOUNTER — OUTPATIENT (OUTPATIENT)
Dept: OUTPATIENT SERVICES | Facility: HOSPITAL | Age: 45
LOS: 1 days | End: 2025-04-27
Payer: MEDICARE

## 2025-04-27 DIAGNOSIS — Z98.890 OTHER SPECIFIED POSTPROCEDURAL STATES: Chronic | ICD-10-CM

## 2025-04-27 DIAGNOSIS — Z96.89 PRESENCE OF OTHER SPECIFIED FUNCTIONAL IMPLANTS: Chronic | ICD-10-CM

## 2025-04-27 PROCEDURE — 95800 SLP STDY UNATTENDED: CPT

## 2025-04-29 DIAGNOSIS — G47.33 OBSTRUCTIVE SLEEP APNEA (ADULT) (PEDIATRIC): ICD-10-CM

## 2025-05-13 ENCOUNTER — APPOINTMENT (OUTPATIENT)
Dept: NEUROLOGY | Facility: CLINIC | Age: 45
End: 2025-05-13
Payer: MEDICARE

## 2025-05-13 VITALS
BODY MASS INDEX: 24.33 KG/M2 | HEIGHT: 67 IN | HEART RATE: 73 BPM | DIASTOLIC BLOOD PRESSURE: 79 MMHG | WEIGHT: 155 LBS | SYSTOLIC BLOOD PRESSURE: 123 MMHG

## 2025-05-13 PROCEDURE — 95970 ALYS NPGT W/O PRGRMG: CPT

## 2025-05-13 PROCEDURE — 99214 OFFICE O/P EST MOD 30 MIN: CPT | Mod: 25

## 2025-05-13 RX ORDER — LORAZEPAM 0.5 MG/1
0.5 TABLET ORAL
Qty: 2 | Refills: 0 | Status: ACTIVE | COMMUNITY
Start: 2025-05-13 | End: 1900-01-01

## 2025-05-23 ENCOUNTER — RX RENEWAL (OUTPATIENT)
Age: 45
End: 2025-05-23

## 2025-09-02 ENCOUNTER — RX RENEWAL (OUTPATIENT)
Age: 45
End: 2025-09-02

## 2025-09-10 ENCOUNTER — NON-APPOINTMENT (OUTPATIENT)
Age: 45
End: 2025-09-10

## 2025-09-11 ENCOUNTER — APPOINTMENT (OUTPATIENT)
Dept: NEUROLOGY | Facility: CLINIC | Age: 45
End: 2025-09-11
Payer: MEDICARE

## 2025-09-11 VITALS
BODY MASS INDEX: 24.33 KG/M2 | DIASTOLIC BLOOD PRESSURE: 79 MMHG | HEART RATE: 85 BPM | HEIGHT: 67 IN | SYSTOLIC BLOOD PRESSURE: 121 MMHG | WEIGHT: 155 LBS

## 2025-09-11 PROCEDURE — 95970 ALYS NPGT W/O PRGRMG: CPT

## 2025-09-11 PROCEDURE — 99214 OFFICE O/P EST MOD 30 MIN: CPT | Mod: 25

## 2025-09-11 RX ORDER — CARBIDOPA AND LEVODOPA 52.5; 21 MG/1; MG/1
52.5-21 CAPSULE, EXTENDED RELEASE ORAL
Qty: 90 | Refills: 5 | Status: ACTIVE | COMMUNITY
Start: 2025-09-11 | End: 1900-01-01

## 2025-09-18 ENCOUNTER — APPOINTMENT (OUTPATIENT)
Dept: INTERNAL MEDICINE | Facility: CLINIC | Age: 45
End: 2025-09-18

## (undated) DEVICE — ELCTR BIPOLAR CORD J&J 12FT DISP

## (undated) DEVICE — GLV 8 PROTEXIS (WHITE)

## (undated) DEVICE — ELCTR PLASMA BLADE X 3.0S WIDE TIP

## (undated) DEVICE — DRSG TELFA 3 X 8

## (undated) DEVICE — VENODYNE/SCD SLEEVE CALF LARGE

## (undated) DEVICE — PACK MINOR

## (undated) DEVICE — DRAPE TOWEL BLUE 17" X 24"

## (undated) DEVICE — BIPOLAR FORCEP SYMMETRY BAYONET 7" X 1.5MM SMOOTH (SILVER)

## (undated) DEVICE — DRSG CURITY GAUZE SPONGE 4 X 4" 12-PLY

## (undated) DEVICE — MAGNET AND MANUAL PATIENT

## (undated) DEVICE — SKIN STAPLE REMOVAL

## (undated) DEVICE — SOL BETADINE POUCH 0.75OZ STERILE

## (undated) DEVICE — DRAPE SPLIT SHEET 77" X 108"

## (undated) DEVICE — DRAPE IOBAN 23" X 23"

## (undated) DEVICE — SUT VICRYL 2-0 18" CP-2 UNDYED (POP-OFF)

## (undated) DEVICE — GLV 7.5 PROTEXIS (WHITE)

## (undated) DEVICE — DRSG XEROFORM 1 X 8"

## (undated) DEVICE — STAPLER SKIN VISI-STAT 35 WIDE

## (undated) DEVICE — SPECIMEN CONTAINER 100ML

## (undated) DEVICE — DRAPE 1/2 SHEET 40X57"

## (undated) DEVICE — DRSG TEGADERM 4X4.75"

## (undated) DEVICE — PREP CHLORAPREP HI-LITE ORANGE 26ML

## (undated) DEVICE — DRAPE 3/4 SHEET W REINFORCEMENT 56X77"

## (undated) DEVICE — SUT SOFSILK 2-0 18" C-23